# Patient Record
Sex: FEMALE | Race: WHITE | NOT HISPANIC OR LATINO | Employment: FULL TIME | URBAN - NONMETROPOLITAN AREA
[De-identification: names, ages, dates, MRNs, and addresses within clinical notes are randomized per-mention and may not be internally consistent; named-entity substitution may affect disease eponyms.]

---

## 2017-09-27 PROCEDURE — 87081 CULTURE SCREEN ONLY: CPT | Performed by: NURSE PRACTITIONER

## 2017-11-20 ENCOUNTER — TRANSCRIBE ORDERS (OUTPATIENT)
Dept: LAB | Facility: HOSPITAL | Age: 22
End: 2017-11-20

## 2017-11-20 ENCOUNTER — LAB (OUTPATIENT)
Dept: LAB | Facility: HOSPITAL | Age: 22
End: 2017-11-20

## 2017-11-20 DIAGNOSIS — Z45.09 ENCOUNTER FOR INTERROGATION OF CARDIAC RECORDER: ICD-10-CM

## 2017-11-20 DIAGNOSIS — R00.2 PALPITATIONS: Primary | ICD-10-CM

## 2017-11-20 DIAGNOSIS — R00.2 PALPITATIONS: ICD-10-CM

## 2017-11-20 LAB
ALBUMIN SERPL-MCNC: 4.1 G/DL (ref 3.4–4.8)
ALBUMIN/GLOB SERPL: 1.4 G/DL (ref 1.1–1.8)
ALP SERPL-CCNC: 51 U/L (ref 38–126)
ALT SERPL W P-5'-P-CCNC: 24 U/L (ref 9–52)
ANION GAP SERPL CALCULATED.3IONS-SCNC: 11 MMOL/L (ref 5–15)
APTT PPP: 30.2 SECONDS (ref 20–40.3)
AST SERPL-CCNC: 35 U/L (ref 14–36)
BASOPHILS # BLD AUTO: 0.04 10*3/MM3 (ref 0–0.2)
BASOPHILS NFR BLD AUTO: 0.5 % (ref 0–2)
BILIRUB SERPL-MCNC: 0.3 MG/DL (ref 0.2–1.3)
BUN BLD-MCNC: 14 MG/DL (ref 7–21)
BUN/CREAT SERPL: 18.7 (ref 7–25)
CALCIUM SPEC-SCNC: 9.4 MG/DL (ref 8.4–10.2)
CHLORIDE SERPL-SCNC: 104 MMOL/L (ref 95–110)
CO2 SERPL-SCNC: 25 MMOL/L (ref 22–31)
CREAT BLD-MCNC: 0.75 MG/DL (ref 0.5–1)
DEPRECATED RDW RBC AUTO: 37.8 FL (ref 36.4–46.3)
EOSINOPHIL # BLD AUTO: 0.18 10*3/MM3 (ref 0–0.7)
EOSINOPHIL NFR BLD AUTO: 2.2 % (ref 0–7)
ERYTHROCYTE [DISTWIDTH] IN BLOOD BY AUTOMATED COUNT: 13.2 % (ref 11.5–14.5)
GFR SERPL CREATININE-BSD FRML MDRD: 98 ML/MIN/1.73 (ref 60–165)
GLOBULIN UR ELPH-MCNC: 2.9 GM/DL (ref 2.3–3.5)
GLUCOSE BLD-MCNC: 97 MG/DL (ref 60–100)
HCT VFR BLD AUTO: 38.5 % (ref 35–45)
HGB BLD-MCNC: 12.8 G/DL (ref 12–15.5)
IMM GRANULOCYTES # BLD: 0.02 10*3/MM3 (ref 0–0.02)
IMM GRANULOCYTES NFR BLD: 0.2 % (ref 0–0.5)
INR PPP: 0.92 (ref 0.8–1.2)
LYMPHOCYTES # BLD AUTO: 2.5 10*3/MM3 (ref 0.6–4.2)
LYMPHOCYTES NFR BLD AUTO: 30 % (ref 10–50)
MCH RBC QN AUTO: 26.1 PG (ref 26.5–34)
MCHC RBC AUTO-ENTMCNC: 33.2 G/DL (ref 31.4–36)
MCV RBC AUTO: 78.4 FL (ref 80–98)
MONOCYTES # BLD AUTO: 0.54 10*3/MM3 (ref 0–0.9)
MONOCYTES NFR BLD AUTO: 6.5 % (ref 0–12)
NEUTROPHILS # BLD AUTO: 5.05 10*3/MM3 (ref 2–8.6)
NEUTROPHILS NFR BLD AUTO: 60.6 % (ref 37–80)
PLATELET # BLD AUTO: 283 10*3/MM3 (ref 150–450)
PMV BLD AUTO: 11.1 FL (ref 8–12)
POTASSIUM BLD-SCNC: 4.1 MMOL/L (ref 3.5–5.1)
PROT SERPL-MCNC: 7 G/DL (ref 6.3–8.6)
PROTHROMBIN TIME: 12.3 SECONDS (ref 11.1–15.3)
RBC # BLD AUTO: 4.91 10*6/MM3 (ref 3.77–5.16)
SODIUM BLD-SCNC: 140 MMOL/L (ref 137–145)
WBC NRBC COR # BLD: 8.33 10*3/MM3 (ref 3.2–9.8)

## 2017-11-20 PROCEDURE — 80053 COMPREHEN METABOLIC PANEL: CPT

## 2017-11-20 PROCEDURE — 85610 PROTHROMBIN TIME: CPT

## 2017-11-20 PROCEDURE — 85025 COMPLETE CBC W/AUTO DIFF WBC: CPT

## 2017-11-20 PROCEDURE — 85730 THROMBOPLASTIN TIME PARTIAL: CPT

## 2017-11-20 PROCEDURE — 36415 COLL VENOUS BLD VENIPUNCTURE: CPT

## 2017-11-28 ENCOUNTER — OFFICE VISIT (OUTPATIENT)
Dept: FAMILY MEDICINE CLINIC | Facility: CLINIC | Age: 22
End: 2017-11-28

## 2017-11-28 ENCOUNTER — APPOINTMENT (OUTPATIENT)
Dept: LAB | Facility: HOSPITAL | Age: 22
End: 2017-11-28

## 2017-11-28 VITALS
BODY MASS INDEX: 30.75 KG/M2 | SYSTOLIC BLOOD PRESSURE: 108 MMHG | HEIGHT: 63 IN | WEIGHT: 173.56 LBS | DIASTOLIC BLOOD PRESSURE: 60 MMHG | HEART RATE: 97 BPM | OXYGEN SATURATION: 99 %

## 2017-11-28 DIAGNOSIS — Z23 NEED FOR HPV VACCINATION: ICD-10-CM

## 2017-11-28 DIAGNOSIS — F41.9 ANXIETY: Primary | ICD-10-CM

## 2017-11-28 LAB
T4 FREE SERPL-MCNC: 0.8 NG/DL (ref 0.78–2.19)
TSH SERPL DL<=0.05 MIU/L-ACNC: 1.38 MIU/ML (ref 0.46–4.68)

## 2017-11-28 PROCEDURE — 36415 COLL VENOUS BLD VENIPUNCTURE: CPT | Performed by: FAMILY MEDICINE

## 2017-11-28 PROCEDURE — 84443 ASSAY THYROID STIM HORMONE: CPT | Performed by: FAMILY MEDICINE

## 2017-11-28 PROCEDURE — 99203 OFFICE O/P NEW LOW 30 MIN: CPT | Performed by: FAMILY MEDICINE

## 2017-11-28 PROCEDURE — 90471 IMMUNIZATION ADMIN: CPT | Performed by: FAMILY MEDICINE

## 2017-11-28 PROCEDURE — 84439 ASSAY OF FREE THYROXINE: CPT | Performed by: FAMILY MEDICINE

## 2017-11-28 PROCEDURE — 90649 4VHPV VACCINE 3 DOSE IM: CPT | Performed by: FAMILY MEDICINE

## 2017-11-28 RX ORDER — CITALOPRAM 20 MG/1
20 TABLET ORAL DAILY
Qty: 30 TABLET | Refills: 0 | Status: SHIPPED | OUTPATIENT
Start: 2017-11-28 | End: 2017-12-26 | Stop reason: SDUPTHER

## 2017-11-28 NOTE — PROGRESS NOTES
"  Subjective:     Yumiko Cooley is a 22 y.o. female who presents to establish care for anxiety.    ANXIETY HPI:     General:  Onset of symptoms: gradual  Duration of symptoms: since 2016, there was no specific event that she can link to the start of her anxiety.  Concerns: easily irritated  Stressors: \"sensory overload\", some difficulty functioning at work, easily overwhelmed   Current diagnosis: n/a  Response to treatment: n/a    Anxiety Symptoms:   Feeling anxious, Feeling irritable and Difficulty concentrating  General Anxiety Disorder Symptoms:   Excessive anxiety or worry , Experiencing anxiety lasting more than 6 months, Feeling restless, Difficulty concentrating and Feeling irritable  Panic Attack Symptoms:   none*  Panic Disorder Symptoms:   none*  Specific Anxiety Symptoms:    none*  Associated Symptoms:   Feeling depressed, Loss of interest or pleasure in activities and Lack of energy  Comorbid Conditions:   Family Hx of depression  PHQ-9 Depression Screening  Little interest or pleasure in doing things? 1   Feeling down, depressed, or hopeless? 1   Trouble falling or staying asleep, or sleeping too much? 0   Feeling tired or having little energy? 2   Poor appetite or overeating? 0   Feeling bad about yourself - or that you are a failure or have let yourself or your family down? 2   Trouble concentrating on things, such as reading the newspaper or watching television? 0   Moving or speaking so slowly that other people could have noticed? Or the opposite - being so fidgety or restless that you have been moving around a lot more than usual? 2   Thoughts that you would be better off dead, or of hurting yourself in some way? 0   PHQ-9 Total Score 8   If you checked off any problems, how difficult have these problems made it for you to do your work, take care of things at home, or get along with other people? Somewhat difficult (Biting nails until they bleed, snapping at people, easily irritated, " overwhelmed very easily, sensory overload)     Preventative:  Over the past 2 weeks, have you felt down, depressed, or hopeless?Yes   Over the past 2 weeks, have you felt little interest or pleasure in doing things?Yes  Clinical depression screening refused by patient.No     On osteoporosis therapy?Not Indicated     Past Medical Hx:  Past Medical History:   Diagnosis Date   • Anxiety    • SVT (supraventricular tachycardia)     s/p ablation November 2015       Past Surgical Hx:  Past Surgical History:   Procedure Laterality Date   • APPENDECTOMY  11/2012   • CARDIAC ABLATION  11/2015    for SVT       Health Maintenance:  Health Maintenance   Topic Date Due   • INFLUENZA VACCINE  08/01/2017   • PAP SMEAR  09/27/2017   • TDAP/TD VACCINES (2 - Td) 08/07/2024       Current Meds:  No current outpatient prescriptions on file.    Allergies:  Review of patient's allergies indicates no active allergies.    Family Hx:  Family History   Problem Relation Age of Onset   • Depression Mother    • Hypertension Father        Social History:  Social History     Social History   • Marital status:      Spouse name: N/A   • Number of children: N/A   • Years of education: N/A     Occupational History   • Not on file.     Social History Main Topics   • Smoking status: Not on file   • Smokeless tobacco: Not on file   • Alcohol use Not on file   • Drug use: Not on file   • Sexual activity: Not on file     Other Topics Concern   • Not on file     Social History Narrative       Review of Systems  Review of Systems   Constitutional: Positive for fatigue. Negative for appetite change, chills, fever and unexpected weight change.   HENT: Negative for congestion, sore throat and trouble swallowing.    Eyes: Negative for photophobia and visual disturbance.   Respiratory: Negative for cough, chest tightness, shortness of breath and wheezing.    Cardiovascular: Negative for chest pain, palpitations and leg swelling.   Gastrointestinal: Negative  "for abdominal distention, abdominal pain, constipation, diarrhea, nausea and vomiting.   Endocrine: Negative for cold intolerance and heat intolerance.   Genitourinary: Negative for decreased urine volume and difficulty urinating.   Musculoskeletal: Negative for arthralgias and myalgias.   Skin: Negative for color change and rash.   Neurological: Negative for dizziness, weakness, light-headedness and headaches.   Psychiatric/Behavioral: Positive for decreased concentration. Negative for confusion, sleep disturbance and suicidal ideas. The patient is nervous/anxious.      Objective:     /60 (BP Location: Left arm, Patient Position: Sitting, Cuff Size: Adult)  Pulse 97  Ht 63\" (160 cm)  Wt 173 lb 9 oz (78.7 kg)  SpO2 99%  BMI 30.75 kg/m2    Physical Exam   Constitutional: She is oriented to person, place, and time. She appears well-developed and well-nourished.   HENT:   Head: Normocephalic and atraumatic.   Nose: Nose normal.   Eyes: Conjunctivae and EOM are normal. Pupils are equal, round, and reactive to light.   Neck: Normal range of motion. Neck supple.   Cardiovascular: Normal rate, regular rhythm, normal heart sounds and intact distal pulses.  Exam reveals no friction rub.    No murmur heard.  Pulmonary/Chest: Effort normal and breath sounds normal. No respiratory distress. She has no wheezes. She has no rales. She exhibits no tenderness.   Abdominal: Soft. Bowel sounds are normal. She exhibits no distension. There is no tenderness.   Musculoskeletal: Normal range of motion. She exhibits no edema.   Neurological: She is alert and oriented to person, place, and time.   Skin: Skin is warm and dry.   Psychiatric: She has a normal mood and affect. Her behavior is normal.   Vitals reviewed.       Assessment/Plan:     Yumiko was seen today for establish care and anxiety.    Diagnoses and all orders for this visit:    Anxiety  -     TSH  -     T4, Free  -     citalopram (CELEXA) 20 MG tablet; Take 1 tablet " by mouth Daily.    Need for HPV vaccination  -     HPV Vaccine QuadriValent 3 Dose IM      Follow-up:     Return in about 1 month (around 12/28/2017) for PAP SMEAR. & HPV #2    GOALS:  Control Anxiety  BARRIERS:  None    Preventative:  Female Preventative: needs pap smear  Recommended Vaccines: HPV series      Never Smoker  does not drink  eat more fruits and vegetables, increase water intake, increase physical activity and have 3 meals a day    RISK SCORE: 3      Signature  Esperanza Soriano MD PGY3  [place] [address]  Office: [phone]    This document has been electronically signed by Esperanza Soriano MD on November 29, 2017 8:46 AM

## 2017-11-29 NOTE — PROGRESS NOTES
I have reviewed the notes, assessments, and/or procedures performed. I concur with her/his documentation of Yumiko Cooley.     Mark Jackson, DO

## 2017-12-26 ENCOUNTER — OFFICE VISIT (OUTPATIENT)
Dept: FAMILY MEDICINE CLINIC | Facility: CLINIC | Age: 22
End: 2017-12-26

## 2017-12-26 VITALS
DIASTOLIC BLOOD PRESSURE: 66 MMHG | HEART RATE: 86 BPM | SYSTOLIC BLOOD PRESSURE: 110 MMHG | HEIGHT: 63 IN | BODY MASS INDEX: 30.73 KG/M2 | WEIGHT: 173.44 LBS | OXYGEN SATURATION: 98 %

## 2017-12-26 DIAGNOSIS — Z23 IMMUNIZATION DUE: ICD-10-CM

## 2017-12-26 DIAGNOSIS — F41.9 ANXIETY: Primary | ICD-10-CM

## 2017-12-26 PROCEDURE — 90471 IMMUNIZATION ADMIN: CPT | Performed by: FAMILY MEDICINE

## 2017-12-26 PROCEDURE — 99213 OFFICE O/P EST LOW 20 MIN: CPT | Performed by: FAMILY MEDICINE

## 2017-12-26 PROCEDURE — 90649 4VHPV VACCINE 3 DOSE IM: CPT | Performed by: FAMILY MEDICINE

## 2017-12-26 RX ORDER — CITALOPRAM 20 MG/1
20 TABLET ORAL DAILY
Qty: 30 TABLET | Refills: 3 | Status: SHIPPED | OUTPATIENT
Start: 2017-12-26 | End: 2018-04-03 | Stop reason: SDUPTHER

## 2018-01-05 NOTE — PROGRESS NOTES
I have reviewed the notes, assessments, and/or procedures performed by Esperanza Soriano MD , I concur with her/his documentation of Yumiko Cooley.         This document has been electronically signed by Jenny Martínez MD on January 5, 2018 9:53 AM

## 2018-04-03 ENCOUNTER — OFFICE VISIT (OUTPATIENT)
Dept: FAMILY MEDICINE CLINIC | Facility: CLINIC | Age: 23
End: 2018-04-03

## 2018-04-03 ENCOUNTER — APPOINTMENT (OUTPATIENT)
Dept: LAB | Facility: HOSPITAL | Age: 23
End: 2018-04-03

## 2018-04-03 DIAGNOSIS — Z31.69 INFERTILITY COUNSELING: ICD-10-CM

## 2018-04-03 DIAGNOSIS — F41.9 ANXIETY: ICD-10-CM

## 2018-04-03 DIAGNOSIS — R03.0 ELEVATED BLOOD PRESSURE READING: Primary | ICD-10-CM

## 2018-04-03 LAB
ALBUMIN SERPL-MCNC: 4.6 G/DL (ref 3.4–4.8)
ALBUMIN/GLOB SERPL: 1.4 G/DL (ref 1.1–1.8)
ALP SERPL-CCNC: 56 U/L (ref 38–126)
ALT SERPL W P-5'-P-CCNC: 24 U/L (ref 9–52)
ANION GAP SERPL CALCULATED.3IONS-SCNC: 14 MMOL/L (ref 5–15)
AST SERPL-CCNC: 36 U/L (ref 14–36)
BILIRUB SERPL-MCNC: 0.3 MG/DL (ref 0.2–1.3)
BUN BLD-MCNC: 10 MG/DL (ref 7–21)
BUN/CREAT SERPL: 15.9 (ref 7–25)
CALCIUM SPEC-SCNC: 10 MG/DL (ref 8.4–10.2)
CHLORIDE SERPL-SCNC: 99 MMOL/L (ref 95–110)
CO2 SERPL-SCNC: 28 MMOL/L (ref 22–31)
CREAT BLD-MCNC: 0.63 MG/DL (ref 0.5–1)
GFR SERPL CREATININE-BSD FRML MDRD: 119 ML/MIN/1.73 (ref 60–165)
GLOBULIN UR ELPH-MCNC: 3.2 GM/DL (ref 2.3–3.5)
GLUCOSE BLD-MCNC: 87 MG/DL (ref 60–100)
POTASSIUM BLD-SCNC: 4.1 MMOL/L (ref 3.5–5.1)
PROT SERPL-MCNC: 7.8 G/DL (ref 6.3–8.6)
SODIUM BLD-SCNC: 141 MMOL/L (ref 137–145)
TSH SERPL DL<=0.05 MIU/L-ACNC: 2.14 MIU/ML (ref 0.46–4.68)

## 2018-04-03 PROCEDURE — 84144 ASSAY OF PROGESTERONE: CPT | Performed by: FAMILY MEDICINE

## 2018-04-03 PROCEDURE — 80053 COMPREHEN METABOLIC PANEL: CPT | Performed by: FAMILY MEDICINE

## 2018-04-03 PROCEDURE — 81220 CFTR GENE COM VARIANTS: CPT | Performed by: FAMILY MEDICINE

## 2018-04-03 PROCEDURE — 36415 COLL VENOUS BLD VENIPUNCTURE: CPT | Performed by: FAMILY MEDICINE

## 2018-04-03 PROCEDURE — 84443 ASSAY THYROID STIM HORMONE: CPT | Performed by: FAMILY MEDICINE

## 2018-04-03 PROCEDURE — 83001 ASSAY OF GONADOTROPIN (FSH): CPT | Performed by: FAMILY MEDICINE

## 2018-04-03 PROCEDURE — 99213 OFFICE O/P EST LOW 20 MIN: CPT | Performed by: FAMILY MEDICINE

## 2018-04-03 RX ORDER — CITALOPRAM 20 MG/1
20 TABLET ORAL DAILY
Qty: 30 TABLET | Refills: 3 | Status: SHIPPED | OUTPATIENT
Start: 2018-04-03 | End: 2018-06-12 | Stop reason: SDUPTHER

## 2018-04-03 NOTE — PROGRESS NOTES
"  Subjective:     Chief Complaint   Patient presents with   • Anxiety       Yumiko Cooley is a 23 y.o. female who presents for 3 month follow-up of anxiety.  She was started on Celexa 20 mg, and has done well with it and notes no adverse effects.    She also wants to be tested for infertility.  She and her  have been trying for about 13 months.  He has not been evaluated.  Her last period was 3/5 - 3/10.    ANXIETY HPI:     General:  Onset of symptoms: gradual  Duration of symptoms: since 2016, there was no specific event that she can link to the start of her anxiety.  Concerns: easily irritated  Stressors: \"sensory overload\", some difficulty functioning at work, easily overwhelmed   Current diagnosis: anxiety  Response to treatment: effective    Anxiety Symptoms:   Feeling anxious, Feeling irritable and Difficulty concentrating  General Anxiety Disorder Symptoms:   Excessive anxiety or worry , Experiencing anxiety lasting more than 6 months, Feeling restless, Difficulty concentrating and Feeling irritable  Panic Attack Symptoms:   none*  Panic Disorder Symptoms:   none*  Specific Anxiety Symptoms:    none*  Associated Symptoms:   Feeling depressed, Loss of interest or pleasure in activities and Lack of energy  Comorbid Conditions:   Family Hx of depression  PHQ-9 Depression Screening  Little interest or pleasure in doing things? 0   Feeling down, depressed, or hopeless? 0   Trouble falling or staying asleep, or sleeping too much? 1   Feeling tired or having little energy? 1   Poor appetite or overeating? 0   Feeling bad about yourself - or that you are a failure or have let yourself or your family down? 0   Trouble concentrating on things, such as reading the newspaper or watching television? 0   Moving or speaking so slowly that other people could have noticed? Or the opposite - being so fidgety or restless that you have been moving around a lot more than usual? 0   Thoughts that you would be " better off dead, or of hurting yourself in some way? 0   PHQ-9 Total Score 2   If you checked off any problems, how difficult have these problems made it for you to do your work, take care of things at home, or get along with other people? Not difficult at all     Preventative:  Over the past 2 weeks, have you felt down, depressed, or hopeless?No  Over the past 2 weeks, have you felt little interest or pleasure in doing things?No  Clinical depression screening refused by patient.No     On osteoporosis therapy?Not Indicated     Past Medical Hx:  Past Medical History:   Diagnosis Date   • Anxiety    • SVT (supraventricular tachycardia)     s/p ablation November 2015       Past Surgical Hx:  Past Surgical History:   Procedure Laterality Date   • APPENDECTOMY  11/2012   • CARDIAC ABLATION  11/2015    for SVT       Health Maintenance:  Health Maintenance   Topic Date Due   • PAP SMEAR  09/27/2017   • HPV VACCINES (3 of 3 - Female 3 Dose Series) 05/28/2018   • TDAP/TD VACCINES (2 - Td) 08/07/2024   • INFLUENZA VACCINE  Completed       Current Meds:    Current Outpatient Prescriptions:   •  citalopram (CELEXA) 20 MG tablet, Take 1 tablet by mouth Daily., Disp: 30 tablet, Rfl: 3    Allergies:  Vancomycin    Family Hx:  Family History   Problem Relation Age of Onset   • Depression Mother    • Hypertension Father        Social History:  Social History     Social History   • Marital status:      Spouse name: N/A   • Number of children: N/A   • Years of education: N/A     Occupational History   • Not on file.     Social History Main Topics   • Smoking status: Never Smoker   • Smokeless tobacco: Never Used   • Alcohol use No   • Drug use: No   • Sexual activity: Yes     Partners: Male     Other Topics Concern   • Not on file     Social History Narrative   • No narrative on file       Review of Systems  Review of Systems   Constitutional: Positive for fatigue. Negative for appetite change, chills, fever and unexpected  "weight change.   HENT: Negative for congestion, sore throat and trouble swallowing.    Eyes: Negative for photophobia and visual disturbance.   Respiratory: Negative for cough, chest tightness, shortness of breath and wheezing.    Cardiovascular: Negative for chest pain, palpitations and leg swelling.   Gastrointestinal: Negative for abdominal distention, abdominal pain, constipation, diarrhea, nausea and vomiting.   Endocrine: Negative for cold intolerance and heat intolerance.   Genitourinary: Negative for decreased urine volume and difficulty urinating.   Musculoskeletal: Negative for arthralgias and myalgias.   Skin: Negative for color change and rash.   Neurological: Negative for dizziness, weakness, light-headedness and headaches.   Psychiatric/Behavioral: Positive for dysphoric mood. Negative for confusion, sleep disturbance and suicidal ideas. The patient is nervous/anxious.      Objective:     Vitals:    04/03/18 1448 04/03/18 1508   BP: 150/100 120/78   BP Location: Left arm    Patient Position: Sitting    Cuff Size: Adult    Pulse: 90    SpO2: 99%    Weight: 80.9 kg (178 lb 4 oz)    Height: 199.9 cm (78.7\")        Physical Exam   Constitutional: She is oriented to person, place, and time. She appears well-developed and well-nourished.   HENT:   Head: Normocephalic and atraumatic.   Nose: Nose normal.   Eyes: Conjunctivae and EOM are normal. Pupils are equal, round, and reactive to light.   Neck: Normal range of motion. Neck supple.   Cardiovascular: Normal rate, regular rhythm, normal heart sounds and intact distal pulses.  Exam reveals no friction rub.    No murmur heard.  Pulmonary/Chest: Effort normal and breath sounds normal. No respiratory distress. She has no wheezes. She has no rales. She exhibits no tenderness.   Abdominal: Soft. Bowel sounds are normal. She exhibits no distension. There is no tenderness.   Musculoskeletal: Normal range of motion. She exhibits no edema.   Neurological: She is " alert and oriented to person, place, and time.   Skin: Skin is warm and dry.   Psychiatric: She has a normal mood and affect. Her behavior is normal.   Vitals reviewed.       Assessment/Plan:     Yumiko was seen today for anxiety.    Diagnoses and all orders for this visit:    Elevated blood pressure reading  - Repeat blood pressure at goal  -     Comprehensive Metabolic Panel  -     Blood Pressure Device - advised to check 2-3 times weekly  - Educational materials given to patient (DASH diet)    Anxiety  -     citalopram (CELEXA) 20 MG tablet; Take 1 tablet by mouth Daily.    Infertility counseling  -     Follicle Stimulating Hormone  -     TSH  -     Prolactin  -     Progesterone      Follow-up:     Return in about 1 month (around 5/3/2018) for Recheck high blood pressure.    Goals        Patient Stated    • Reduce fat intake/eat healthier (pt-stated)            Barriers:  Likes [employer], but will try to eat it less frequently, e.g. Once every 2 weeks instead of weekly            Preventative:  Female Preventative: needs pap smear, she would prefer with her PCP  Recommended Vaccines: HPV series (3rd)    Never Smoker  does not drink  eat more fruits and vegetables, increase water intake, increase physical activity and have 3 meals a day    RISK SCORE: 3          This document has been electronically signed by Stu Pena MD on April 7, 2018 8:59 PM

## 2018-04-04 LAB — FSH SERPL-ACNC: 3.9 MIU/ML

## 2018-04-05 LAB
PROGEST SERPL-MCNC: 4.1 NG/ML
PROLACTIN SERPL-MCNC: 17.8 NG/ML (ref 4.8–23.3)

## 2018-04-07 VITALS
SYSTOLIC BLOOD PRESSURE: 120 MMHG | BODY MASS INDEX: 24.14 KG/M2 | WEIGHT: 178.25 LBS | HEART RATE: 90 BPM | HEIGHT: 72 IN | DIASTOLIC BLOOD PRESSURE: 78 MMHG | OXYGEN SATURATION: 99 %

## 2018-04-08 NOTE — PROGRESS NOTES
I have reviewed the notes, assessments, and/or procedures performed by Dr. Soriano, I concur with her/his documentation of Yumiko Cooley.         This document has been electronically signed by Rosanne Sandra MD on April 8, 2018 12:23 AM

## 2018-06-12 ENCOUNTER — OFFICE VISIT (OUTPATIENT)
Dept: FAMILY MEDICINE CLINIC | Facility: CLINIC | Age: 23
End: 2018-06-12

## 2018-06-12 VITALS
SYSTOLIC BLOOD PRESSURE: 122 MMHG | DIASTOLIC BLOOD PRESSURE: 74 MMHG | WEIGHT: 182 LBS | HEIGHT: 63 IN | OXYGEN SATURATION: 98 % | BODY MASS INDEX: 32.25 KG/M2 | HEART RATE: 91 BPM

## 2018-06-12 DIAGNOSIS — F41.9 ANXIETY: Primary | ICD-10-CM

## 2018-06-12 DIAGNOSIS — Z23 NEED FOR HPV VACCINATION: ICD-10-CM

## 2018-06-12 PROCEDURE — 90471 IMMUNIZATION ADMIN: CPT | Performed by: FAMILY MEDICINE

## 2018-06-12 PROCEDURE — 90649 4VHPV VACCINE 3 DOSE IM: CPT | Performed by: FAMILY MEDICINE

## 2018-06-12 PROCEDURE — 99213 OFFICE O/P EST LOW 20 MIN: CPT | Performed by: FAMILY MEDICINE

## 2018-06-12 RX ORDER — CITALOPRAM 40 MG/1
40 TABLET ORAL DAILY
Qty: 30 TABLET | Refills: 0 | Status: SHIPPED | OUTPATIENT
Start: 2018-06-12 | End: 2018-07-25 | Stop reason: SDUPTHER

## 2018-06-12 NOTE — PROGRESS NOTES
"  Subjective:     Yumiko Cooley is a 23 y.o. female who presents for follow up of anxiety and elevated blood pressure reading.    Elevated blood pressure reading:   Patient was seen in the office 4/3/18 for anxiety follow up, was found to have blood pressure of 150/100, recheck at the end of her office visit showed a reading of 120/78 without medical intervention.  She was instructed to start a DASH diet.  She has had no further elevated readings.    ANXIETY HPI:     General:  Onset of symptoms: gradual  Duration of symptoms: since 2016, there was no specific event that she can link to the start of her anxiety.  Concerns: easily irritated  Stressors: \"sensory overload\", some difficulty functioning at work, easily overwhelmed   Current diagnosis: Anxiety  Response to treatment: initial good response to Celexa 20mg but now feels like it does not work as well for her.  She is still not interested in counseling.    Anxiety Symptoms:   Feeling anxious, Feeling irritable and Difficulty concentrating  General Anxiety Disorder Symptoms:   Excessive anxiety or worry , Experiencing anxiety lasting more than 6 months, Feeling restless, Difficulty concentrating and Feeling irritable  Panic Attack Symptoms:   none*  Panic Disorder Symptoms:   none*  Specific Anxiety Symptoms:    none*  Associated Symptoms:   Feeling depressed, Loss of interest or pleasure in activities and Lack of energy  Comorbid Conditions:   Family Hx of depression    Preventative:  Over the past 2 weeks, have you felt down, depressed, or hopeless?Yes   Over the past 2 weeks, have you felt little interest or pleasure in doing things?Yes  Clinical depression screening refused by patient.No     On osteoporosis therapy?Not Indicated     Past Medical Hx:  Past Medical History:   Diagnosis Date   • Anxiety    • SVT (supraventricular tachycardia)     s/p ablation November 2015       Past Surgical Hx:  Past Surgical History:   Procedure Laterality Date "   • APPENDECTOMY  11/2012   • CARDIAC ABLATION  11/2015    for SVT       Health Maintenance:  Health Maintenance   Topic Date Due   • ANNUAL PHYSICAL  04/05/1999   • PAP SMEAR  09/27/2017   • HPV VACCINES (3 of 3 - Female 3 Dose Series) 05/28/2018   • INFLUENZA VACCINE  08/01/2018   • TDAP/TD VACCINES (2 - Td) 08/07/2024   • MENINGOCOCCAL VACCINE (Normal Risk)  Completed       Current Meds:    Current Outpatient Prescriptions:   •  citalopram (CELEXA) 20 MG tablet, Take 1 tablet by mouth Daily., Disp: 30 tablet, Rfl: 3    Allergies:  Vancomycin    Family Hx:  Family History   Problem Relation Age of Onset   • Depression Mother    • Hypertension Father        Social History:  Social History     Social History   • Marital status:      Spouse name: N/A   • Number of children: N/A   • Years of education: N/A     Occupational History   • Not on file.     Social History Main Topics   • Smoking status: Never Smoker   • Smokeless tobacco: Never Used   • Alcohol use No   • Drug use: No   • Sexual activity: Yes     Partners: Male     Other Topics Concern   • Not on file     Social History Narrative   • No narrative on file       Review of Systems  Review of Systems   Constitutional: Negative for activity change, appetite change, chills, fever and unexpected weight change.   HENT: Negative for congestion and trouble swallowing.    Eyes: Negative for photophobia and visual disturbance.   Respiratory: Negative for cough, chest tightness, shortness of breath and wheezing.    Cardiovascular: Negative for chest pain, palpitations and leg swelling.   Gastrointestinal: Negative for abdominal pain, constipation, diarrhea, nausea and vomiting.   Endocrine: Negative for cold intolerance and heat intolerance.   Genitourinary: Negative for decreased urine volume and difficulty urinating.   Musculoskeletal: Negative for arthralgias and myalgias.   Skin: Negative for color change and rash.   Neurological: Negative for dizziness,  weakness, light-headedness and headaches.   Psychiatric/Behavioral: Positive for decreased concentration. Negative for confusion, sleep disturbance and suicidal ideas. The patient is nervous/anxious.      Objective:     There were no vitals taken for this visit.    Physical Exam   Constitutional: She is oriented to person, place, and time. She appears well-developed and well-nourished. No distress.   HENT:   Head: Normocephalic and atraumatic.   Nose: Nose normal.   Eyes: Conjunctivae and EOM are normal.   Neck: Normal range of motion. Neck supple.   Cardiovascular: Normal rate, regular rhythm, normal heart sounds and intact distal pulses.  Exam reveals no friction rub.    No murmur heard.  Pulmonary/Chest: Effort normal and breath sounds normal. No respiratory distress. She has no wheezes. She has no rales. She exhibits no tenderness.   Abdominal: Soft. Bowel sounds are normal. She exhibits no distension. There is no tenderness.   Musculoskeletal: Normal range of motion. She exhibits no edema.   Neurological: She is alert and oriented to person, place, and time.   Skin: Skin is warm and dry. Capillary refill takes less than 2 seconds. She is not diaphoretic.   Psychiatric: She has a normal mood and affect. Her behavior is normal.   Vitals reviewed.       Assessment/Plan:     Yumiko was seen today for elevated blood pressure reading and anxiety.    Diagnoses and all orders for this visit:    Anxiety  -     citalopram (CeleXA) 40 MG tablet; Take 1 tablet by mouth Daily.    Need for HPV vaccination  -     HPV Vaccine QuadriValent 3 Dose IM      Follow-up:     Return in about 3 months (around 9/12/2018) for Recheck/Est Care Anxiety with Edilia Ch MD (blue team intern).    Goals        Patient Stated    • Reduce fat intake/eat healthier (pt-stated)            Barriers:  Likes [employer], but will try to eat it less frequently, e.g. Once every 2 weeks instead of weekly          Preventative:  Female Preventative:  needs pap smear  Recommended Vaccines: completion of HPV series      Never Smoker  does not drink  eat more fruits and vegetables, increase water intake, increase physical activity and have 3 meals a day    Patient's Body mass index is 32.24 kg/m². BMI is above normal parameters. Recommendations include: exercise counseling and nutrition counseling.      RISK SCORE: 3      Signature  Esperanza Soriano MD PGY3  [place] [address]  Office: [phone]    This document has been electronically signed by Esperanza Soriano MD on June 12, 2018 3:39 PM

## 2018-07-25 DIAGNOSIS — F41.9 ANXIETY: ICD-10-CM

## 2018-07-25 RX ORDER — CITALOPRAM 40 MG/1
TABLET ORAL
Qty: 30 TABLET | Refills: 0 | OUTPATIENT
Start: 2018-07-25 | End: 2018-09-01

## 2018-07-31 DIAGNOSIS — F41.9 ANXIETY: ICD-10-CM

## 2018-08-01 RX ORDER — CITALOPRAM 40 MG/1
TABLET ORAL
Qty: 30 TABLET | Refills: 0 | Status: SHIPPED | OUTPATIENT
Start: 2018-08-01 | End: 2018-09-10 | Stop reason: SDUPTHER

## 2018-09-10 ENCOUNTER — OFFICE VISIT (OUTPATIENT)
Dept: FAMILY MEDICINE CLINIC | Facility: CLINIC | Age: 23
End: 2018-09-10

## 2018-09-10 VITALS
OXYGEN SATURATION: 99 % | SYSTOLIC BLOOD PRESSURE: 126 MMHG | BODY MASS INDEX: 31.54 KG/M2 | DIASTOLIC BLOOD PRESSURE: 74 MMHG | HEART RATE: 82 BPM | HEIGHT: 63 IN | WEIGHT: 178 LBS

## 2018-09-10 DIAGNOSIS — F41.9 ANXIETY: ICD-10-CM

## 2018-09-10 PROCEDURE — 99213 OFFICE O/P EST LOW 20 MIN: CPT | Performed by: STUDENT IN AN ORGANIZED HEALTH CARE EDUCATION/TRAINING PROGRAM

## 2018-09-10 RX ORDER — CITALOPRAM 40 MG/1
40 TABLET ORAL DAILY
Qty: 30 TABLET | Refills: 3 | Status: SHIPPED | OUTPATIENT
Start: 2018-09-10 | End: 2018-10-10

## 2018-09-10 NOTE — PROGRESS NOTES
ID: Yumiko Cooley    CC:   Chief Complaint   Patient presents with   • Anxiety       Subjective:     HPI     Yumiko Cooley is a 23 y.o. female who presents for medication refills.     Pt has been on Celexa for the past several months and states has been doing very well on it. She has not been feeling as anxious as she use to.     She has been experiencing irregular bleeding which she has a OB appt for on September 27.     Preventative:  Over the past 2 weeks, have you felt down, depressed, or hopeless? no  Over the past 2 weeks, have you felt little interest or pleasure in doing things? no  Clinical depression screening refused by patient no    On osteoporosis therapy? no    Past Medical Hx:  Past Medical History:   Diagnosis Date   • Anxiety    • SVT (supraventricular tachycardia) (CMS/HCC)     s/p ablation November 2015       Past Surgical Hx:  Past Surgical History:   Procedure Laterality Date   • APPENDECTOMY  11/2012   • CARDIAC ABLATION  11/2015    for SVT       Health Maintenance:  Health Maintenance   Topic Date Due   • ANNUAL PHYSICAL  04/05/1999   • PAP SMEAR  09/27/2017   • INFLUENZA VACCINE  08/01/2018   • TDAP/TD VACCINES (2 - Td) 08/07/2024   • MENINGOCOCCAL VACCINE (Normal Risk)  Completed   • HPV VACCINES  Completed       Current Meds:    Current Outpatient Prescriptions:   •  citalopram (CeleXA) 40 MG tablet, Take 1 tablet by mouth Daily for 30 days., Disp: 30 tablet, Rfl: 3  •  medroxyPROGESTERone (PROVERA) 10 MG tablet, Take 1 tablet by mouth Daily for 10 days., Disp: 10 tablet, Rfl: 0    Allergies:  Vancomycin    Family Hx:  Family History   Problem Relation Age of Onset   • Depression Mother    • Hypertension Father         Social History:  Social History     Social History   • Marital status:      Spouse name: N/A   • Number of children: N/A   • Years of education: N/A     Occupational History   • Not on file.     Social History Main Topics   • Smoking status:  "Never Smoker   • Smokeless tobacco: Never Used   • Alcohol use No   • Drug use: No   • Sexual activity: Yes     Partners: Male     Other Topics Concern   • Not on file     Social History Narrative   • No narrative on file       Review of Systems   HENT: Negative for congestion, dental problem, drooling and ear discharge.    Cardiovascular: Negative for chest pain, palpitations and leg swelling.   Gastrointestinal: Negative for abdominal pain, anal bleeding, blood in stool and constipation.   Endocrine: Negative for heat intolerance, polyphagia and polyuria.   Genitourinary: Negative for genital sores, hematuria and menstrual problem.   Musculoskeletal: Negative for back pain, gait problem and joint swelling.   Skin: Negative for color change, pallor and rash.   Neurological: Negative for dizziness, facial asymmetry and headaches.   Psychiatric/Behavioral: Negative for behavioral problems, confusion, decreased concentration, hallucinations, self-injury and sleep disturbance. The patient is not hyperactive.    All other systems reviewed and are negative.          Objective:     /74   Pulse 82   Ht 160 cm (63\")   Wt 80.7 kg (178 lb)   LMP 08/21/2018 (Exact Date)   SpO2 99%   BMI 31.53 kg/m²     Physical Exam   Constitutional: She is oriented to person, place, and time. She appears well-developed and well-nourished.   HENT:   Head: Normocephalic and atraumatic.   Right Ear: External ear normal.   Left Ear: External ear normal.   Nose: Nose normal.   Mouth/Throat: Oropharynx is clear and moist.   Eyes: Pupils are equal, round, and reactive to light. Conjunctivae and EOM are normal.   Neck: Normal range of motion. Neck supple.   Cardiovascular: Normal rate, regular rhythm, normal heart sounds and intact distal pulses.    Pulmonary/Chest: Effort normal and breath sounds normal.   Abdominal: Soft. Bowel sounds are normal.   Musculoskeletal: Normal range of motion.   Neurological: She is alert and oriented to " person, place, and time.   Skin: Skin is warm and dry.   Psychiatric: She has a normal mood and affect. Her behavior is normal. Judgment and thought content normal.              Assessment/Plan:     Yumiko was seen today for anxiety.    Diagnoses and all orders for this visit:    Anxiety  -     citalopram (CeleXA) 40 MG tablet; Take 1 tablet by mouth Daily for 30 days.  - doing well   -continue taking once a day             Follow-up:     Follow up in three months     Goals:   Goals     • Reduce fat intake/eat healthier (pt-stated)            Barriers:  Likes [employer], but will try to eat it less frequently, e.g. Once every 2 weeks instead of weekly          Barriers to goals: none     Health Maintenance   Topic Date Due   • ANNUAL PHYSICAL  04/05/1999   • PAP SMEAR  09/27/2017   • INFLUENZA VACCINE  08/01/2018   • TDAP/TD VACCINES (2 - Td) 08/07/2024   • MENINGOCOCCAL VACCINE (Normal Risk)  Completed   • HPV VACCINES  Completed       Tobacco: nonsmoker  Alcohol: occasional/rare  Lifestyle: Patient's Body mass index is 31.53 kg/m². BMI is above normal parameters. Recommendations include: exercise counseling and nutrition counseling.   eat more fruits and vegetables, decrease soda or juice intake, increase water intake, increase physical activity, reduce screen time, reduce portion size, cut out extra servings and reduce fast food intake    RISK SCORE: 3         Edilia Ch M.D. PGY1  [facility]  [address] [place] [address]  Office: [phone]    This document has been electronically signed by Edilia hC MD on September 10, 2018 9:25 AM          This document has been electronically signed by Edilia Ch MD on September 10, 2018 9:22 AM

## 2018-09-11 NOTE — PROGRESS NOTES
Pt's appt appears to be with GYN and not with OB.  I have seen this patient and discussed the case with resident and agree with the assessment and plan.  CLARENCE Samuel M.D.

## 2018-09-13 ENCOUNTER — LAB (OUTPATIENT)
Dept: LAB | Facility: HOSPITAL | Age: 23
End: 2018-09-13

## 2018-09-13 DIAGNOSIS — N92.3 VAGINAL BLEEDING BETWEEN PERIODS: ICD-10-CM

## 2018-09-13 LAB
BASOPHILS # BLD AUTO: 0.04 10*3/MM3 (ref 0–0.2)
BASOPHILS NFR BLD AUTO: 0.4 % (ref 0–2)
DEPRECATED RDW RBC AUTO: 39.8 FL (ref 36.4–46.3)
EOSINOPHIL # BLD AUTO: 0.09 10*3/MM3 (ref 0–0.7)
EOSINOPHIL NFR BLD AUTO: 1 % (ref 0–7)
ERYTHROCYTE [DISTWIDTH] IN BLOOD BY AUTOMATED COUNT: 13.8 % (ref 11.5–14.5)
HCG INTACT+B SERPL-ACNC: <2.4 MIU/ML
HCT VFR BLD AUTO: 40.6 % (ref 35–45)
HGB BLD-MCNC: 13.2 G/DL (ref 12–15.5)
IMM GRANULOCYTES # BLD: 0.03 10*3/MM3 (ref 0–0.02)
IMM GRANULOCYTES NFR BLD: 0.3 % (ref 0–0.5)
LYMPHOCYTES # BLD AUTO: 2.74 10*3/MM3 (ref 0.6–4.2)
LYMPHOCYTES NFR BLD AUTO: 28.9 % (ref 10–50)
MCH RBC QN AUTO: 26 PG (ref 26.5–34)
MCHC RBC AUTO-ENTMCNC: 32.5 G/DL (ref 31.4–36)
MCV RBC AUTO: 80.1 FL (ref 80–98)
MONOCYTES # BLD AUTO: 0.48 10*3/MM3 (ref 0–0.9)
MONOCYTES NFR BLD AUTO: 5.1 % (ref 0–12)
NEUTROPHILS # BLD AUTO: 6.09 10*3/MM3 (ref 2–8.6)
NEUTROPHILS NFR BLD AUTO: 64.3 % (ref 37–80)
PLATELET # BLD AUTO: 278 10*3/MM3 (ref 150–450)
PMV BLD AUTO: 11.5 FL (ref 8–12)
RBC # BLD AUTO: 5.07 10*6/MM3 (ref 3.77–5.16)
WBC NRBC COR # BLD: 9.47 10*3/MM3 (ref 3.2–9.8)

## 2018-09-13 PROCEDURE — 36415 COLL VENOUS BLD VENIPUNCTURE: CPT

## 2018-09-13 PROCEDURE — 85025 COMPLETE CBC W/AUTO DIFF WBC: CPT

## 2018-09-13 PROCEDURE — 84702 CHORIONIC GONADOTROPIN TEST: CPT

## 2018-09-27 ENCOUNTER — OFFICE VISIT (OUTPATIENT)
Dept: OBSTETRICS AND GYNECOLOGY | Facility: CLINIC | Age: 23
End: 2018-09-27

## 2018-09-27 ENCOUNTER — APPOINTMENT (OUTPATIENT)
Dept: LAB | Facility: HOSPITAL | Age: 23
End: 2018-09-27

## 2018-09-27 VITALS
BODY MASS INDEX: 32.25 KG/M2 | WEIGHT: 182 LBS | HEART RATE: 87 BPM | HEIGHT: 63 IN | SYSTOLIC BLOOD PRESSURE: 123 MMHG | DIASTOLIC BLOOD PRESSURE: 81 MMHG

## 2018-09-27 DIAGNOSIS — N92.1 MENORRHAGIA WITH IRREGULAR CYCLE: ICD-10-CM

## 2018-09-27 DIAGNOSIS — Z01.419 ENCOUNTER FOR GYNECOLOGICAL EXAMINATION WITHOUT ABNORMAL FINDING: Primary | ICD-10-CM

## 2018-09-27 LAB
ALBUMIN SERPL-MCNC: 4.3 G/DL (ref 3.4–4.8)
ALBUMIN/GLOB SERPL: 1.3 G/DL (ref 1.1–1.8)
ALP SERPL-CCNC: 69 U/L (ref 38–126)
ALT SERPL W P-5'-P-CCNC: 27 U/L (ref 9–52)
ANION GAP SERPL CALCULATED.3IONS-SCNC: 11 MMOL/L (ref 5–15)
ARTICHOKE IGE QN: 80 MG/DL (ref 1–129)
AST SERPL-CCNC: 26 U/L (ref 14–36)
BILIRUB SERPL-MCNC: 0.4 MG/DL (ref 0.2–1.3)
BUN BLD-MCNC: 9 MG/DL (ref 7–21)
BUN/CREAT SERPL: 13.4 (ref 7–25)
CALCIUM SPEC-SCNC: 9 MG/DL (ref 8.4–10.2)
CHLORIDE SERPL-SCNC: 97 MMOL/L (ref 95–110)
CHOLEST SERPL-MCNC: 156 MG/DL (ref 0–199)
CO2 SERPL-SCNC: 27 MMOL/L (ref 22–31)
CREAT BLD-MCNC: 0.67 MG/DL (ref 0.5–1)
GFR SERPL CREATININE-BSD FRML MDRD: 110 ML/MIN/1.73 (ref 71–165)
GLOBULIN UR ELPH-MCNC: 3.2 GM/DL (ref 2.3–3.5)
GLUCOSE BLD-MCNC: 94 MG/DL (ref 60–100)
HBA1C MFR BLD: 5.5 % (ref 4–5.6)
HDLC SERPL-MCNC: 59 MG/DL (ref 60–200)
LDLC/HDLC SERPL: 1.43 {RATIO} (ref 0–3.22)
POTASSIUM BLD-SCNC: 3.9 MMOL/L (ref 3.5–5.1)
PROT SERPL-MCNC: 7.5 G/DL (ref 6.3–8.6)
SODIUM BLD-SCNC: 135 MMOL/L (ref 137–145)
TRIGL SERPL-MCNC: 63 MG/DL (ref 20–199)
TSH SERPL DL<=0.05 MIU/L-ACNC: 2.12 MIU/ML (ref 0.46–4.68)

## 2018-09-27 PROCEDURE — 83036 HEMOGLOBIN GLYCOSYLATED A1C: CPT | Performed by: NURSE PRACTITIONER

## 2018-09-27 PROCEDURE — 80061 LIPID PANEL: CPT | Performed by: NURSE PRACTITIONER

## 2018-09-27 PROCEDURE — 84443 ASSAY THYROID STIM HORMONE: CPT | Performed by: NURSE PRACTITIONER

## 2018-09-27 PROCEDURE — 80053 COMPREHEN METABOLIC PANEL: CPT | Performed by: NURSE PRACTITIONER

## 2018-09-27 PROCEDURE — 84403 ASSAY OF TOTAL TESTOSTERONE: CPT | Performed by: NURSE PRACTITIONER

## 2018-09-27 PROCEDURE — 83525 ASSAY OF INSULIN: CPT | Performed by: NURSE PRACTITIONER

## 2018-09-27 PROCEDURE — 84270 ASSAY OF SEX HORMONE GLOBUL: CPT | Performed by: NURSE PRACTITIONER

## 2018-09-27 PROCEDURE — 36415 COLL VENOUS BLD VENIPUNCTURE: CPT | Performed by: NURSE PRACTITIONER

## 2018-09-27 PROCEDURE — 84144 ASSAY OF PROGESTERONE: CPT | Performed by: NURSE PRACTITIONER

## 2018-09-27 PROCEDURE — 83002 ASSAY OF GONADOTROPIN (LH): CPT | Performed by: NURSE PRACTITIONER

## 2018-09-27 PROCEDURE — 84402 ASSAY OF FREE TESTOSTERONE: CPT | Performed by: NURSE PRACTITIONER

## 2018-09-27 PROCEDURE — 99385 PREV VISIT NEW AGE 18-39: CPT | Performed by: NURSE PRACTITIONER

## 2018-09-27 PROCEDURE — G0123 SCREEN CERV/VAG THIN LAYER: HCPCS | Performed by: NURSE PRACTITIONER

## 2018-09-27 RX ORDER — FOLIC ACID 1 MG/1
1 TABLET ORAL DAILY
Qty: 90 TABLET | Refills: 4 | Status: SHIPPED | OUTPATIENT
Start: 2018-09-27 | End: 2019-02-27

## 2018-09-28 LAB
INSULIN SERPL-ACNC: 12.6 UIU/ML (ref 2.6–24.9)
LH SERPL-ACNC: 9.66 MIU/ML
PROGEST SERPL-MCNC: 0.2 NG/ML
SHBG SERPL-SCNC: 19 NMOL/L (ref 24.6–122)

## 2018-10-01 LAB
LAB AP CASE REPORT: NORMAL
LAB AP GYN ADDITIONAL INFORMATION: NORMAL
LAB AP GYN OTHER FINDINGS: NORMAL
PATH INTERP SPEC-IMP: NORMAL
STAT OF ADQ CVX/VAG CYTO-IMP: NORMAL
TESTOST FREE MFR SERPL: 2.38 % (ref 0.5–2.8)
TESTOST FREE SERPL-MCNC: 0.68 NG/DL (ref 0.1–0.85)
TESTOST SERPL-MCNC: 28.4 NG/DL (ref 10–55)

## 2018-10-03 RX ORDER — NORETHINDRONE ACETATE AND ETHINYL ESTRADIOL 1MG-20(21)
1 KIT ORAL DAILY
Qty: 28 TABLET | Refills: 12 | Status: SHIPPED | OUTPATIENT
Start: 2018-10-03 | End: 2019-02-04

## 2018-10-03 RX ORDER — METRONIDAZOLE 500 MG/1
500 TABLET ORAL 2 TIMES DAILY
Qty: 14 TABLET | Refills: 0 | Status: SHIPPED | OUTPATIENT
Start: 2018-10-03 | End: 2018-10-10

## 2019-03-13 ENCOUNTER — OFFICE VISIT (OUTPATIENT)
Dept: FAMILY MEDICINE CLINIC | Facility: CLINIC | Age: 24
End: 2019-03-13

## 2019-03-13 VITALS
SYSTOLIC BLOOD PRESSURE: 110 MMHG | OXYGEN SATURATION: 99 % | BODY MASS INDEX: 32.51 KG/M2 | HEART RATE: 80 BPM | WEIGHT: 183.5 LBS | DIASTOLIC BLOOD PRESSURE: 82 MMHG | HEIGHT: 63 IN

## 2019-03-13 DIAGNOSIS — G43.809 OTHER MIGRAINE WITHOUT STATUS MIGRAINOSUS, NOT INTRACTABLE: Primary | ICD-10-CM

## 2019-03-13 PROCEDURE — 99203 OFFICE O/P NEW LOW 30 MIN: CPT | Performed by: STUDENT IN AN ORGANIZED HEALTH CARE EDUCATION/TRAINING PROGRAM

## 2019-03-13 RX ORDER — SUMATRIPTAN 50 MG/1
TABLET, FILM COATED ORAL
Qty: 10 TABLET | Refills: 0 | Status: SHIPPED | OUTPATIENT
Start: 2019-03-13 | End: 2019-08-17

## 2019-03-18 NOTE — PROGRESS NOTES
I have reviewed the patient.  I have reviewed the notes, assessments, and/or procedures performed by Dr Eliu Ch, I concur with her  documentation and assessment and plan for Yumiko Cooley.          This document has been electronically signed by Av Holman MD on March 18, 2019 10:49 AM

## 2019-03-18 NOTE — PROGRESS NOTES
ID: Yumiko Cooley    CC:   Chief Complaint   Patient presents with   • Headache     PERSISTENT HEADACHES        Subjective:     HPI     Yumiko Cooley is a 23 y.o. female who presents for migraines. Patient states she has had migraines for the past several years. She has only taken over the counter medications for this, which have not resolved the pain. She states when they come they last about a few days before fully resolving. Patient describes it as a dull pain that is more in the back of her head and neck. She rates the pain a 6/10. She does have photophobia and nausea when she has these migraines. She does not have any nasal discharge or eye tearing. Patient does not have any other symptoms.     Preventative:  Over the past 2 weeks, have you felt down, depressed, or hopeless? no  Over the past 2 weeks, have you felt little interest or pleasure in doing things? no  Clinical depression screening refused by patient no    On osteoporosis therapy? no    Past Medical Hx:  Past Medical History:   Diagnosis Date   • Anxiety    • SVT (supraventricular tachycardia) (CMS/[facility])     s/p ablation November 2015   • Varicella        Past Surgical Hx:  Past Surgical History:   Procedure Laterality Date   • ANKLE SURGERY  12/01/2010   • APPENDECTOMY  11/2012   • CARDIAC ABLATION  11/2015    for SVT       Health Maintenance:  Health Maintenance   Topic Date Due   • ANNUAL PHYSICAL  04/05/1999   • PAP SMEAR  09/27/2021   • TDAP/TD VACCINES (2 - Td) 08/07/2024   • INFLUENZA VACCINE  Completed   • MENINGOCOCCAL VACCINE (Normal Risk)  Completed   • HPV VACCINES  Completed       Current Meds:    Current Outpatient Medications:   •  estropipate (ORTHO-EST 1.25) 1.5 MG tablet, Take 1.5 mg by mouth Daily., Disp: , Rfl:   •  SUMAtriptan (IMITREX) 50 MG tablet, Take one tablet at onset of headache. May repeat dose one time in 2 hours if headache not relieved., Disp: 10 tablet, Rfl: 0    Allergies:  Vancomycin and  Tamiflu [oseltamivir phosphate]    Family Hx:  Family History   Problem Relation Age of Onset   • Depression Mother    • Hypertension Father    • No Known Problems Brother    • No Known Problems Sister    • Uterine cancer Paternal Grandmother    • Colon cancer Maternal Grandmother         Social History:  Social History     Socioeconomic History   • Marital status:      Spouse name: Not on file   • Number of children: Not on file   • Years of education: Not on file   • Highest education level: Not on file   Social Needs   • Financial resource strain: Not on file   • Food insecurity - worry: Not on file   • Food insecurity - inability: Not on file   • Transportation needs - medical: Not on file   • Transportation needs - non-medical: Not on file   Occupational History   • Not on file   Tobacco Use   • Smoking status: Never Smoker   • Smokeless tobacco: Never Used   Substance and Sexual Activity   • Alcohol use: No   • Drug use: No   • Sexual activity: Yes     Partners: Male     Birth control/protection: None   Other Topics Concern   • Not on file   Social History Narrative   • Not on file       Review of Systems   Constitutional: Negative for activity change, appetite change, chills, fatigue and fever.   HENT: Negative for drooling, ear discharge, ear pain, facial swelling, hearing loss, mouth sores, rhinorrhea and sinus pain.    Eyes: Negative for pain, redness and itching.   Respiratory: Negative for cough, choking, chest tightness, shortness of breath and stridor.    Cardiovascular: Negative for chest pain, palpitations and leg swelling.   Gastrointestinal: Negative for abdominal distention, abdominal pain, anal bleeding, blood in stool, constipation, diarrhea and nausea.   Endocrine: Negative for heat intolerance, polydipsia and polyphagia.   Genitourinary: Negative for dysuria, flank pain, frequency and genital sores.   Musculoskeletal: Negative for back pain, gait problem, joint swelling and myalgias.  "  Skin: Negative for pallor and rash.   Neurological: Positive for headaches. Negative for seizures, facial asymmetry, speech difficulty, light-headedness and numbness.   Hematological: Negative for adenopathy. Does not bruise/bleed easily.   Psychiatric/Behavioral: Negative for confusion, decreased concentration, dysphoric mood and hallucinations. The patient is not nervous/anxious and is not hyperactive.            Objective:     /82   Pulse 80   Ht 160 cm (63\")   Wt 83.2 kg (183 lb 8 oz)   LMP 02/12/2019   SpO2 99%   BMI 32.51 kg/m²     Physical Exam   Constitutional: She is oriented to person, place, and time. She appears well-developed and well-nourished.   HENT:   Head: Normocephalic and atraumatic.   Right Ear: External ear normal.   Left Ear: External ear normal.   Nose: Nose normal.   Mouth/Throat: Oropharynx is clear and moist.   Eyes: Conjunctivae and EOM are normal. Pupils are equal, round, and reactive to light.   Neck: Normal range of motion. Neck supple.   Cardiovascular: Normal rate, regular rhythm, normal heart sounds and intact distal pulses.   Pulmonary/Chest: Effort normal and breath sounds normal.   Abdominal: Soft. Bowel sounds are normal.   Musculoskeletal: Normal range of motion.   Neurological: She is alert and oriented to person, place, and time.   Skin: Skin is warm and dry.   Psychiatric: She has a normal mood and affect. Her behavior is normal. Judgment and thought content normal.            Assessment/Plan:     Migraines:   - imitrex, if does not work will change to different medication       Follow-up:     No Follow-up on file.      Goals:   Goals     • Reduce fat intake/eat healthier (pt-stated)      Barriers:  Likes [employer], but will try to eat it less frequently, e.g. Once every 2 weeks instead of weekly          Barriers to goals: obesity     Health Maintenance   Topic Date Due   • ANNUAL PHYSICAL  04/05/1999   • PAP SMEAR  09/27/2021   • TDAP/TD VACCINES (2 - Td) " 08/07/2024   • INFLUENZA VACCINE  Completed   • MENINGOCOCCAL VACCINE (Normal Risk)  Completed   • HPV VACCINES  Completed       Tobacco: nonsmoker  Alcohol: occasional/rare  Lifestyle: Patient's Body mass index is 32.51 kg/m². BMI is above normal parameters. Recommendations include: exercise counseling and nutrition counseling.   eat more fruits and vegetables, decrease soda or juice intake, increase water intake, increase physical activity, reduce screen time, reduce portion size, cut out extra servings and reduce fast food intake    RISK SCORE: 3         Edilia Ch M.D. PGY1  [facility]  [address] [place] [address]  Office: [phone]    This document has been electronically signed by Edilia Ch MD on March 18, 2019 7:35 AM          This document has been electronically signed by Edilia Ch MD on March 18, 2019 7:31 AM

## 2019-09-27 ENCOUNTER — OFFICE VISIT (OUTPATIENT)
Dept: OBSTETRICS AND GYNECOLOGY | Facility: CLINIC | Age: 24
End: 2019-09-27

## 2019-09-27 VITALS
WEIGHT: 190 LBS | DIASTOLIC BLOOD PRESSURE: 81 MMHG | HEART RATE: 83 BPM | HEIGHT: 63 IN | BODY MASS INDEX: 33.66 KG/M2 | SYSTOLIC BLOOD PRESSURE: 132 MMHG

## 2019-09-27 DIAGNOSIS — N97.0 ANOVULAR MENSTRUATION: Primary | ICD-10-CM

## 2019-09-27 PROCEDURE — 99213 OFFICE O/P EST LOW 20 MIN: CPT | Performed by: NURSE PRACTITIONER

## 2019-09-27 RX ORDER — FOLIC ACID 1 MG/1
1000 TABLET ORAL DAILY
Refills: 4 | COMMUNITY
Start: 2019-09-25 | End: 2020-01-18

## 2019-09-27 NOTE — PROGRESS NOTES
Subjective   Yumiko MazariegosJustoJohn is a 24 y.o. here with her husban, Jimmy Lopez, to discuss fertility medications.    LMP- 9/4/19    Dx with PCOS w/o insulin resistance in September 2018. Took a few months of birth control to help get periods regulated and they have been regular since then.    States that she is having 30 day cycles and has been using home ovulation tests and has seen a rise in the LH but is not getting a peak result. She is having timed intercourse during her fertile week. She and her  have been trying to get pregnant for 2-3 years and have never conceived. He denies any hx of testicular procedures as a child but was born with pyloric stenosis. He does not smoke or use smokeless tobacco but does drink alcohol on occasion. He has never had a semen analysis.      Other   This is a chronic problem. The current episode started more than 1 year ago. The problem occurs constantly. The problem has been unchanged. Pertinent negatives include no abdominal pain, chest pain, diaphoresis, fatigue or myalgias. Nothing aggravates the symptoms. She has tried nothing for the symptoms. The treatment provided no relief.       The following portions of the patient's history were reviewed and updated as appropriate: allergies, current medications, past family history, past medical history, past social history, past surgical history and problem list.    Review of Systems   Constitutional: Negative for activity change, appetite change, diaphoresis, fatigue, unexpected weight gain and unexpected weight loss.   Respiratory: Negative for chest tightness and shortness of breath.    Cardiovascular: Negative for chest pain and palpitations.   Gastrointestinal: Negative for abdominal distention, abdominal pain, constipation and diarrhea.   Genitourinary: Negative for amenorrhea, breast discharge, breast lump, breast pain, decreased libido, dyspareunia, dysuria, menstrual problem, pelvic pain, vaginal bleeding,  vaginal discharge and vaginal pain.   Musculoskeletal: Negative for myalgias.   Skin: Negative for color change, dry skin and skin lesions.   Neurological: Negative for light-headedness and headache.   Psychiatric/Behavioral: Negative for agitation, dysphoric mood, sleep disturbance, depressed mood and stress. The patient is not nervous/anxious.        Objective   Physical Exam   Constitutional: She is oriented to person, place, and time. Vital signs are normal. She appears well-developed and well-nourished. No distress.   Cardiovascular: Normal rate.   Pulmonary/Chest: Effort normal.   Neurological: She is alert and oriented to person, place, and time.   Skin: Skin is warm and dry. She is not diaphoretic.   Psychiatric: She has a normal mood and affect. Her behavior is normal.   Nursing note and vitals reviewed.    Component      Latest Ref Rng & Units 9/27/2018   Glucose      60 - 100 mg/dL 94   BUN      7 - 21 mg/dL 9   Creatinine      0.50 - 1.00 mg/dL 0.67   Sodium      137 - 145 mmol/L 135 (L)   Potassium      3.5 - 5.1 mmol/L 3.9   Chloride      95 - 110 mmol/L 97   CO2      22.0 - 31.0 mmol/L 27.0   Calcium      8.4 - 10.2 mg/dL 9.0   Total Protein      6.3 - 8.6 g/dL 7.5   Albumin      3.40 - 4.80 g/dL 4.30   ALT (SGPT)      9 - 52 U/L 27   AST (SGOT)      14 - 36 U/L 26   Alkaline Phosphatase      38 - 126 U/L 69   Total Bilirubin      0.2 - 1.3 mg/dL 0.4   eGFR Non  Am      71 - 165 mL/min/1.73 110   Globulin      2.3 - 3.5 gm/dL 3.2   A/G Ratio      1.1 - 1.8 g/dL 1.3   BUN/Creatinine Ratio      7.0 - 25.0 13.4   Anion Gap      5.0 - 15.0 mmol/L 11.0   Total Cholesterol      0 - 199 mg/dL 156   Triglycerides      20 - 199 mg/dL 63   HDL Cholesterol      60 - 200 mg/dL 59 (L)   LDL Cholesterol       1 - 129 mg/dL 80   LDL/HDL Ratio      0.00 - 3.22 1.43   Testosterone, Total      10.0 - 55.0 ng/dL 28.4   Testosterone, Free      0.10 - 0.85 ng/dL 0.68   Testosterone, Free %      0.50 - 2.80 % 2.38    Hemoglobin A1C      4 - 5.6 % 5.5   Insulin      2.6 - 24.9 uIU/mL 12.6   LH      mIU/mL 9.66   Progesterone      ng/mL 0.2   TSH Baseline      0.460 - 4.680 mIU/mL 2.120   Sex Hormone Binding Globulin      24.6 - 122.0 nmol/L 19.0 (L)         Assessment/Plan   Yumiko was seen today for polycystic ovary syndrome.    Diagnoses and all orders for this visit:    Anovular menstruation    Other orders  -     clomiPHENE (CLOMID) 50 MG tablet; Take 1 tablet by mouth daily on cycle days 5-9.      No insulin resistance. Counseling was given to patient and  for the following topics: instructions for management, patient and family education, risks and benefits of treatment options and importance of treatment compliance . Total time of the encounter was 15 minutes and 10 minutes was spent counseling.    R/B/A and potential s/e of Clomid reviewed. Written instructions for clomid use provided and reviewed. Jimmy will have his SA done ASAP; will plan to call with results. ( 10/23/94)

## 2019-12-17 ENCOUNTER — TELEPHONE (OUTPATIENT)
Dept: OBSTETRICS AND GYNECOLOGY | Facility: CLINIC | Age: 24
End: 2019-12-17

## 2019-12-17 NOTE — TELEPHONE ENCOUNTER
PT HAD AN APPOINTMENT WITH WITH DR D'AMICO AND HER OFFICE HAS CLOSED AND SO NOW HE NEEDS A REFERRAL TO DR SHUKLA FOR LOW SPERM COUNT.  PLEASE RETURN HIS CALL AND MAKE THE REFERRAL FOR HIM.  [phone].

## 2020-01-18 PROBLEM — J11.1 INFLUENZA-LIKE ILLNESS: Status: ACTIVE | Noted: 2020-01-18

## 2020-01-18 PROBLEM — Z20.828 EXPOSURE TO INFLUENZA: Status: ACTIVE | Noted: 2020-01-18

## 2020-03-25 ENCOUNTER — LAB (OUTPATIENT)
Dept: LAB | Facility: HOSPITAL | Age: 25
End: 2020-03-25

## 2020-03-25 ENCOUNTER — OFFICE VISIT (OUTPATIENT)
Dept: FAMILY MEDICINE CLINIC | Facility: CLINIC | Age: 25
End: 2020-03-25

## 2020-03-25 VITALS
HEIGHT: 63 IN | TEMPERATURE: 99 F | OXYGEN SATURATION: 99 % | DIASTOLIC BLOOD PRESSURE: 74 MMHG | SYSTOLIC BLOOD PRESSURE: 118 MMHG | HEART RATE: 91 BPM | WEIGHT: 180 LBS | BODY MASS INDEX: 31.89 KG/M2

## 2020-03-25 DIAGNOSIS — K92.1 HEMATOCHEZIA: ICD-10-CM

## 2020-03-25 DIAGNOSIS — K92.1 HEMATOCHEZIA: Primary | ICD-10-CM

## 2020-03-25 LAB
BASOPHILS # BLD AUTO: 0.06 10*3/MM3 (ref 0–0.2)
BASOPHILS NFR BLD AUTO: 0.5 % (ref 0–1.5)
DEPRECATED RDW RBC AUTO: 36.7 FL (ref 37–54)
EOSINOPHIL # BLD AUTO: 0.19 10*3/MM3 (ref 0–0.4)
EOSINOPHIL NFR BLD AUTO: 1.6 % (ref 0.3–6.2)
ERYTHROCYTE [DISTWIDTH] IN BLOOD BY AUTOMATED COUNT: 13.1 % (ref 12.3–15.4)
HCT VFR BLD AUTO: 40.3 % (ref 34–46.6)
HGB BLD-MCNC: 13.5 G/DL (ref 12–15.9)
IMM GRANULOCYTES # BLD AUTO: 0.09 10*3/MM3 (ref 0–0.05)
IMM GRANULOCYTES NFR BLD AUTO: 0.8 % (ref 0–0.5)
LYMPHOCYTES # BLD AUTO: 2.47 10*3/MM3 (ref 0.7–3.1)
LYMPHOCYTES NFR BLD AUTO: 21.4 % (ref 19.6–45.3)
MCH RBC QN AUTO: 26 PG (ref 26.6–33)
MCHC RBC AUTO-ENTMCNC: 33.5 G/DL (ref 31.5–35.7)
MCV RBC AUTO: 77.6 FL (ref 79–97)
MONOCYTES # BLD AUTO: 0.64 10*3/MM3 (ref 0.1–0.9)
MONOCYTES NFR BLD AUTO: 5.5 % (ref 5–12)
NEUTROPHILS # BLD AUTO: 8.1 10*3/MM3 (ref 1.7–7)
NEUTROPHILS NFR BLD AUTO: 70.2 % (ref 42.7–76)
NRBC BLD AUTO-RTO: 0 /100 WBC (ref 0–0.2)
PLATELET # BLD AUTO: 388 10*3/MM3 (ref 140–450)
PMV BLD AUTO: 11.6 FL (ref 6–12)
RBC # BLD AUTO: 5.19 10*6/MM3 (ref 3.77–5.28)
WBC NRBC COR # BLD: 11.55 10*3/MM3 (ref 3.4–10.8)

## 2020-03-25 PROCEDURE — 99213 OFFICE O/P EST LOW 20 MIN: CPT | Performed by: STUDENT IN AN ORGANIZED HEALTH CARE EDUCATION/TRAINING PROGRAM

## 2020-03-25 PROCEDURE — 85025 COMPLETE CBC W/AUTO DIFF WBC: CPT

## 2020-03-25 PROCEDURE — 36415 COLL VENOUS BLD VENIPUNCTURE: CPT

## 2020-03-25 NOTE — PROGRESS NOTES
Family Medicine Residency  Levy Zelaya MD    Subjective:     Yumiko Cooley is a 24 y.o. female who is here for a walk-in visit.  She states that 1 week ago she developed a rash on her face for which she saw a physician.  She was treated with steroids.  The rash cleared up last weekend however left her face dry.  Around the same time 1 week ago she started developing nausea and diarrhea after eating.  She states that her throat swells after eating food.  This morning when she woke up she noticed blood in her loose stool.  Her stool is brown-colored.  She noticed that the toilet bowl was red this morning.  She denies any changes to her diet 1 week ago.  She has been taking Benadryl at nighttime.  Patient states that she is currently feeling nauseous and her throat is feeling puffy today.  The following portions of the patient's history were reviewed and updated as appropriate: allergies, current medications, past family history, past medical history, past social history, past surgical history and problem list.    Past Medical Hx:  Past Medical History:   Diagnosis Date   • Anxiety    • SVT (supraventricular tachycardia) (CMS/[facility])     s/p ablation November 2015   • Varicella        Past Surgical Hx:  Past Surgical History:   Procedure Laterality Date   • ANKLE SURGERY  12/01/2010   • APPENDECTOMY  11/2012   • CARDIAC ABLATION  11/2015    for SVT       Current Meds:    Current Outpatient Medications:   •  brompheniramine-pseudoephedrine-DM (BROMFED DM) 30-2-10 MG/5ML syrup, Take one tsp BID prn cough and congestion, Disp: 120 mL, Rfl: 0  •  ondansetron ODT (ZOFRAN-ODT) 4 MG disintegrating tablet, Take 1 tablet by mouth Every 8 (Eight) Hours As Needed for Nausea or Vomiting for up to 8 doses., Disp: 8 tablet, Rfl: 0    Allergies:  Allergies   Allergen Reactions   • Tamiflu [Oseltamivir Phosphate] Rash   • Vancomycin Itching       Family Hx:  Family History   Problem Relation Age of Onset   • Depression  "Mother    • Hypertension Father    • No Known Problems Brother    • No Known Problems Sister    • Uterine cancer Paternal Grandmother    • Colon cancer Maternal Grandmother    • Colon cancer Maternal Grandfather         Social History:  Social History     Socioeconomic History   • Marital status:      Spouse name: Not on file   • Number of children: Not on file   • Years of education: Not on file   • Highest education level: Not on file   Tobacco Use   • Smoking status: Never Smoker   • Smokeless tobacco: Never Used   Substance and Sexual Activity   • Alcohol use: No   • Drug use: No   • Sexual activity: Yes     Partners: Male     Birth control/protection: None       Review of Systems  Review of Systems   Constitutional: Positive for activity change and fatigue. Negative for chills, diaphoresis and fever.        Loss of appetite   HENT: Positive for sore throat and trouble swallowing. Negative for congestion, drooling, facial swelling, postnasal drip, rhinorrhea, sinus pressure, sinus pain and sneezing.         Trouble swallowing after eating   Respiratory: Negative for apnea, cough, choking, shortness of breath and wheezing.    Cardiovascular: Negative for chest pain.   Gastrointestinal: Positive for abdominal pain, blood in stool, diarrhea and nausea. Negative for anal bleeding, constipation, rectal pain and vomiting.        Abdominal cramping started today, shoots side to side   Musculoskeletal: Negative for back pain, myalgias and neck pain.   Allergic/Immunologic: Negative for food allergies.       Objective:     /74   Pulse 91   Temp 99 °F (37.2 °C)   Ht 160 cm (63\")   Wt 81.6 kg (180 lb)   SpO2 99%   BMI 31.89 kg/m²   Physical Exam   Constitutional: She is oriented to person, place, and time. She appears well-developed and well-nourished.   HENT:   Head: Normocephalic and atraumatic.   Right Ear: External ear normal.   Left Ear: External ear normal.   Nose: Nose normal.   Mouth/Throat: " Oropharynx is clear and moist.   Eyes: Pupils are equal, round, and reactive to light. Conjunctivae and EOM are normal.   Neck: Normal range of motion. Neck supple.   Cardiovascular: Normal rate, regular rhythm, normal heart sounds and intact distal pulses. Exam reveals no gallop and no friction rub.   No murmur heard.  Pulmonary/Chest: Effort normal and breath sounds normal. No stridor. No respiratory distress. She has no wheezes. She exhibits no tenderness.   Abdominal: Soft. Bowel sounds are normal. She exhibits no distension and no mass. There is no tenderness. There is no guarding.   Musculoskeletal: Normal range of motion. She exhibits no edema.   Neurological: She is alert and oriented to person, place, and time. No cranial nerve deficit.   Skin: Skin is warm and dry.   Psychiatric: She has a normal mood and affect. Her behavior is normal. Judgment and thought content normal.        Assessment/Plan:     Patient is a 24-year-old female who was seen today for a viral URI and hematochezia.  She will follow-up with Dr. Edilia Ch in 1 month assuming her symptoms resolve and her CBC is within normal limits    Diagnoses and all orders for this visit:    Viral URI        -     For now we will treat this is a viral pneumonia.  Encourage social distancing as well as good hand hygiene.    Hematochezia: Will complete CBC today.  Will call patient regarding her results.  I told the patient to observe her symptoms for a week and let us know if there is no improvement or she starts developing additional symptoms.  -     CBC       Follow-up:     Return in about 1 month (around 4/25/2020).    Preventative:  Health Maintenance   Topic Date Due   • ANNUAL PHYSICAL  04/05/1999   • HEPATITIS C SCREENING  09/27/2017   • CHLAMYDIA SCREENING  09/27/2017   • INFLUENZA VACCINE  08/01/2019   • PAP SMEAR  09/27/2021   • TDAP/TD VACCINES (2 - Td) 08/07/2024   • HPV VACCINES  Completed         Alcohol use:  reports that she does not  drink alcohol.  Nicotine status  reports that she has never smoked. She has never used smokeless tobacco.    Goals     • Reduce fat intake/eat healthier (pt-stated)      Barriers:  Likes [employer], but will try to eat it less frequently, e.g. Once every 2 weeks instead of weekly            RISK SCORE: 3              This document has been electronically signed by Levy Zelaya MD on March 25, 2020 11:51

## 2020-03-25 NOTE — PROGRESS NOTES
I have reviewed the notes, assessments, and/or procedures performed by dr Zelaya, I concur with her/his documentation and assessment and plan for Yumiko Cooley.                This document has been electronically signed by Won Brunson MD on March 25, 2020 14:52

## 2020-04-01 RX ORDER — FOLIC ACID 1 MG/1
TABLET ORAL
Qty: 90 TABLET | Refills: 3 | Status: SHIPPED | OUTPATIENT
Start: 2020-04-01 | End: 2020-07-30

## 2020-04-02 ENCOUNTER — TELEPHONE (OUTPATIENT)
Dept: FAMILY MEDICINE CLINIC | Facility: CLINIC | Age: 25
End: 2020-04-02

## 2020-04-02 DIAGNOSIS — T78.40XA ALLERGIC STATE, INITIAL ENCOUNTER: Primary | ICD-10-CM

## 2020-04-02 NOTE — TELEPHONE ENCOUNTER
Pt called and said she would like a referral sent to the Allergy Department at [facility].     If any questions, please call [phone]    Thank you

## 2020-04-02 NOTE — TELEPHONE ENCOUNTER
Pt called and said she would like a referral sent to the Allergy Department at [facility].      If any questions, please call [phone]     Thank you

## 2020-04-15 ENCOUNTER — TELEPHONE (OUTPATIENT)
Dept: OBSTETRICS AND GYNECOLOGY | Facility: CLINIC | Age: 25
End: 2020-04-15

## 2020-04-15 NOTE — TELEPHONE ENCOUNTER
Never started clomid back in September because her  has a varicocele. He has been on an antibiotic and the urologist wants him to have surgery after the pandemic but states that they can continue to TTC during this time because the antibiotic may open the tube enough for them to conceive. She was wanting to know if she could get a new Rx for the clomid. Rx R/B/A and potential s/e reviewed and sent to [facility]. Pt to call for refills as needed.

## 2020-04-15 NOTE — TELEPHONE ENCOUNTER
Patient called and wanted to know if she should start taking CLOMID. Her  is being treated with medication for a blockage and will not have surgery until June or July...

## 2020-04-20 ENCOUNTER — TELEPHONE (OUTPATIENT)
Dept: OBSTETRICS AND GYNECOLOGY | Facility: CLINIC | Age: 25
End: 2020-04-20

## 2020-04-26 ENCOUNTER — PATIENT MESSAGE (OUTPATIENT)
Dept: OBSTETRICS AND GYNECOLOGY | Facility: CLINIC | Age: 25
End: 2020-04-26

## 2020-04-27 NOTE — TELEPHONE ENCOUNTER
From: Yumiko Cooley  To: Marta Sethi APRN  Sent: 4/26/2020 7:51 PM CDT  Subject: Non-Urgent Medical Question    Cycle day 11- ovulation test was negative

## 2020-05-15 RX ORDER — SUMATRIPTAN 50 MG/1
TABLET, FILM COATED ORAL
Qty: 10 TABLET | Refills: 0 | Status: SHIPPED | OUTPATIENT
Start: 2020-05-15 | End: 2020-07-30 | Stop reason: SDDI

## 2020-07-30 ENCOUNTER — OFFICE VISIT (OUTPATIENT)
Dept: FAMILY MEDICINE CLINIC | Facility: CLINIC | Age: 25
End: 2020-07-30

## 2020-07-30 VITALS
BODY MASS INDEX: 36.11 KG/M2 | SYSTOLIC BLOOD PRESSURE: 118 MMHG | HEART RATE: 96 BPM | HEIGHT: 63 IN | OXYGEN SATURATION: 99 % | WEIGHT: 203.8 LBS | TEMPERATURE: 98.2 F | DIASTOLIC BLOOD PRESSURE: 80 MMHG

## 2020-07-30 DIAGNOSIS — K21.9 GASTROESOPHAGEAL REFLUX DISEASE, ESOPHAGITIS PRESENCE NOT SPECIFIED: Primary | ICD-10-CM

## 2020-07-30 PROCEDURE — 99213 OFFICE O/P EST LOW 20 MIN: CPT | Performed by: STUDENT IN AN ORGANIZED HEALTH CARE EDUCATION/TRAINING PROGRAM

## 2020-07-30 RX ORDER — FAMOTIDINE 20 MG/1
20 TABLET, FILM COATED ORAL 2 TIMES DAILY
Qty: 60 TABLET | Refills: 1 | Status: SHIPPED | OUTPATIENT
Start: 2020-07-30 | End: 2020-08-28 | Stop reason: SDUPTHER

## 2020-07-31 NOTE — PROGRESS NOTES
Family Medicine Residency  Mark Barba MD    Subjective:     Yumiko Cooley is a 25 y.o. female who presents for initial evaluation of heartburn and diarrhea. For the last 6 months she has been experiencing epigastric discomfort described as burning which radiates up her chest and gives her a bad taste in her mouth. She enjoys spicy foods but states burning occurs with all foods and drinks. She does not lay down immediately after meals.     Her diarrhea started 6 months ago as well and also has a remote history of constipation. Stool is not formed but not liquid and she has 2-3 episodes a day with 1-2 episodes a month of bright red blood mixed with stool. No history of hemorrhoids. Defecation does not relieve abdominal pain which is the same as above. She denies any fevers or recent travel.    The following portions of the patient's history were reviewed and updated as appropriate: allergies, current medications, past family history, past medical history, past social history, past surgical history and problem list.    Past Medical Hx:  Past Medical History:   Diagnosis Date   • Anxiety    • SVT (supraventricular tachycardia) (CMS/[facility])     s/p ablation 2015   • Varicella        Past Surgical Hx:  Past Surgical History:   Procedure Laterality Date   • ANKLE SURGERY  2010   • APPENDECTOMY  2012   • CARDIAC ABLATION  2015    for SVT       Current Meds:    Current Outpatient Medications:   •  Prenatal Multivit-Min-Fe-FA (PRE-ROSS PO), Take  by mouth., Disp: , Rfl:   •  famotidine (Pepcid) 20 MG tablet, Take 1 tablet by mouth 2 (Two) Times a Day., Disp: 60 tablet, Rfl: 1    Allergies:  Allergies   Allergen Reactions   • Tamiflu [Oseltamivir Phosphate] Rash   • Vancomycin Itching       Family Hx:  Family History   Problem Relation Age of Onset   • Depression Mother    • Hypertension Father    • No Known Problems Brother    • No Known Problems Sister    • Uterine cancer Paternal  "Grandmother    • Colon cancer Maternal Grandmother    • Colon cancer Maternal Grandfather         Social History:  Social History     Socioeconomic History   • Marital status:      Spouse name: Not on file   • Number of children: Not on file   • Years of education: Not on file   • Highest education level: Not on file   Tobacco Use   • Smoking status: Never Smoker   • Smokeless tobacco: Never Used   Substance and Sexual Activity   • Alcohol use: No   • Drug use: No   • Sexual activity: Yes     Partners: Male     Birth control/protection: None       Review of Systems  Review of Systems   Constitutional: Negative for activity change and appetite change.   HENT: Negative for congestion and trouble swallowing.    Respiratory: Negative for chest tightness and shortness of breath.    Cardiovascular: Negative for chest pain and palpitations.   Gastrointestinal: Positive for abdominal pain, blood in stool, diarrhea and nausea. Negative for abdominal distention and vomiting.   Genitourinary: Negative for difficulty urinating and dysuria.   Musculoskeletal: Negative for arthralgias and myalgias.   Skin: Negative for color change and pallor.   Neurological: Negative for dizziness, light-headedness and headaches.   Psychiatric/Behavioral: Negative for agitation and behavioral problems.       Objective:     /80   Pulse 96   Temp 98.2 °F (36.8 °C)   Ht 160 cm (63\")   Wt 92.4 kg (203 lb 12.8 oz)   SpO2 99%   Breastfeeding No   BMI 36.10 kg/m²   Physical Exam   Constitutional: She is oriented to person, place, and time. She appears well-developed and well-nourished. No distress.   HENT:   Head: Normocephalic and atraumatic.   Right Ear: External ear normal.   Left Ear: External ear normal.   Nose: Nose normal.   Eyes: Pupils are equal, round, and reactive to light. EOM are normal.   Neck: Normal range of motion. Neck supple.   Cardiovascular: Normal rate, regular rhythm and normal heart sounds. Exam reveals no " gallop and no friction rub.   No murmur heard.  Pulmonary/Chest: Effort normal and breath sounds normal. No respiratory distress. She has no wheezes. She has no rales.   Abdominal: Soft. Bowel sounds are normal. She exhibits no distension. There is tenderness (Mild epigastric).   Musculoskeletal: Normal range of motion. She exhibits no edema.   Neurological: She is alert and oriented to person, place, and time.   Skin: Skin is warm. Capillary refill takes less than 2 seconds. No erythema.   Psychiatric: She has a normal mood and affect. Her behavior is normal.        Assessment/Plan:     Yumiko was seen today for abdominal pain and heartburn.    Diagnoses and all orders for this visit:    Gastroesophageal reflux disease, esophagitis presence not specified  -     famotidine (Pepcid) 20 MG tablet; Take 1 tablet by mouth 2 (Two) Times a Day.  - Decrease gastric irritants.   - Believe diarrhea and GERD may be related as started together. Will give H2 blocker and monitor symptoms.        · Rx changes: Started Famotidine 20mg BID  · Patient Education: Continue to monitor symptoms. Recommended decreasing ingestion of coffee, alcohol, and spicy foods. Can return to clinic sooner if symptoms worsen or fail to improve.   · Compliance at present is estimated to be excellent.   · Efforts to improve compliance (if necessary) will be directed at unnecessary at this time.     Follow-up:     Return in about 4 weeks (around 8/27/2020). Reassess GERD and diarrhea.    Preventative:  Health Maintenance   Topic Date Due   • ANNUAL PHYSICAL  04/05/1999   • PNEUMOCOCCAL VACCINE (19-64 MEDIUM RISK) (1 of 1 - PPSV23) 04/05/2015   • HEPATITIS C SCREENING  09/27/2017   • CHLAMYDIA SCREENING  09/27/2017   • INFLUENZA VACCINE  08/01/2020   • PAP SMEAR  09/27/2021   • TDAP/TD VACCINES (2 - Td) 08/07/2024   • HPV VACCINES  Completed       Recommended: none  Vaccine Counseling: N/A    Weight  -Class: Obese Class II: 35-39.9kg/m2  -Patient's Body  mass index is 36.1 kg/m². BMI is above normal parameters. Recommendations include: exercise counseling and nutrition counseling.   eat more fruits and vegetables, decrease soda or juice intake, increase water intake and increase physical activity    Alcohol use:  reports that she does not drink alcohol.  Nicotine status  reports that she has never smoked. She has never used smokeless tobacco.    Goals        Patient Stated    • Reduce fat intake/eat healthier (pt-stated)      Barriers:  Likes [employer], but will try to eat it less frequently, e.g. Once every 2 weeks instead of weekly            RISK SCORE: 3          This document has been electronically signed by Mark Barba MD on July 30, 2020 19:17

## 2020-07-31 NOTE — PROGRESS NOTES
I have reviewed the patient.  I have reviewed the notes, assessments, and/or procedures performed by Dr Mark Barba, I concur with his  documentation and assessment and plan for Yumiko Cooley.    I discussed the patient during the visit and did discuss assessment and plan for treatment and follow-up        This document has been electronically signed by Av Holman MD on July 31, 2020 09:15

## 2020-08-14 ENCOUNTER — PATIENT MESSAGE (OUTPATIENT)
Dept: OBSTETRICS AND GYNECOLOGY | Facility: CLINIC | Age: 25
End: 2020-08-14

## 2020-08-14 NOTE — TELEPHONE ENCOUNTER
From: Yumiko Cooley  To: Marta Sethi APRN  Sent: 8/14/2020 12:44 PM CDT  Subject: Non-Urgent Medical Question    Jay,   My name is Yumiko Lopez ([birth year]) and my  had a varicocelectomy and they have started him on clomid (Dr. Macdonald) and said I can also take it, and I was wondering if I could get a prescription sent to [facility].     Thank you!

## 2020-08-28 ENCOUNTER — OFFICE VISIT (OUTPATIENT)
Dept: FAMILY MEDICINE CLINIC | Facility: CLINIC | Age: 25
End: 2020-08-28

## 2020-08-28 VITALS
HEART RATE: 96 BPM | RESPIRATION RATE: 20 BRPM | BODY MASS INDEX: 35.6 KG/M2 | HEIGHT: 63 IN | TEMPERATURE: 98.3 F | OXYGEN SATURATION: 98 % | SYSTOLIC BLOOD PRESSURE: 136 MMHG | WEIGHT: 200.9 LBS | DIASTOLIC BLOOD PRESSURE: 82 MMHG

## 2020-08-28 DIAGNOSIS — K21.9 GASTROESOPHAGEAL REFLUX DISEASE, ESOPHAGITIS PRESENCE NOT SPECIFIED: ICD-10-CM

## 2020-08-28 PROCEDURE — 99213 OFFICE O/P EST LOW 20 MIN: CPT | Performed by: STUDENT IN AN ORGANIZED HEALTH CARE EDUCATION/TRAINING PROGRAM

## 2020-08-28 RX ORDER — FAMOTIDINE 20 MG/1
20 TABLET, FILM COATED ORAL 2 TIMES DAILY
Qty: 60 TABLET | Refills: 6 | Status: SHIPPED | OUTPATIENT
Start: 2020-08-28 | End: 2021-04-09

## 2020-08-28 NOTE — PROGRESS NOTES
Family Medicine Residency  Mark Barba MD    Subjective:     Yumiko Cooley is a 25 y.o. female who presents for GERD. She was started on Pepcid 20mg BID and had resolution of epigastric pain and diarrhea aside from normal upset stomach and loose stools with foods that do not settle with her stomach. She has decreased caffeine intake and stopped alcohol intake.     The following portions of the patient's history were reviewed and updated as appropriate: allergies, current medications, past family history, past medical history, past social history, past surgical history and problem list.    Past Medical Hx:  Past Medical History:   Diagnosis Date   • Anxiety    • SVT (supraventricular tachycardia) (CMS/[facility])     s/p ablation 2015   • Varicella        Past Surgical Hx:  Past Surgical History:   Procedure Laterality Date   • ANKLE SURGERY  2010   • APPENDECTOMY  2012   • CARDIAC ABLATION  2015    for SVT       Current Meds:    Current Outpatient Medications:   •  famotidine (Pepcid) 20 MG tablet, Take 1 tablet by mouth 2 (Two) Times a Day., Disp: 60 tablet, Rfl: 6  •  Prenatal Multivit-Min-Fe-FA (PRE-ROSS PO), Take  by mouth., Disp: , Rfl:   •  clomiPHENE (CLOMID) 50 MG tablet, Take 1 tablet by mouth daily on cycle days 5-9., Disp: 5 tablet, Rfl: 0    Allergies:  Allergies   Allergen Reactions   • Tamiflu [Oseltamivir Phosphate] Rash   • Vancomycin Itching       Family Hx:  Family History   Problem Relation Age of Onset   • Depression Mother    • Hypertension Father    • No Known Problems Brother    • No Known Problems Sister    • Uterine cancer Paternal Grandmother    • Colon cancer Maternal Grandmother    • Colon cancer Maternal Grandfather         Social History:  Social History     Socioeconomic History   • Marital status:      Spouse name: Not on file   • Number of children: Not on file   • Years of education: Not on file   • Highest education level: Not on file    "  Tobacco Use   • Smoking status: Never Smoker   • Smokeless tobacco: Never Used   Substance and Sexual Activity   • Alcohol use: No   • Drug use: No   • Sexual activity: Yes     Partners: Male     Birth control/protection: None       Review of Systems  Review of Systems   Constitutional: Negative for activity change and appetite change.   HENT: Negative for congestion and trouble swallowing.    Respiratory: Negative for chest tightness and shortness of breath.    Cardiovascular: Negative for chest pain and palpitations.   Gastrointestinal: Negative for abdominal distention and abdominal pain.   Genitourinary: Negative for difficulty urinating and dysuria.   Musculoskeletal: Negative for arthralgias and myalgias.   Skin: Negative for color change and pallor.   Neurological: Negative for dizziness, light-headedness and headaches.   Psychiatric/Behavioral: Negative for agitation and behavioral problems.       Objective:     /82 (BP Location: Left arm, Patient Position: Sitting, Cuff Size: Adult)   Pulse 96   Temp 98.3 °F (36.8 °C) (Tympanic)   Resp 20   Ht 160 cm (63\")   Wt 91.1 kg (200 lb 14.4 oz)   LMP 08/18/2020   SpO2 98%   BMI 35.59 kg/m²   Physical Exam   Constitutional: She is oriented to person, place, and time. She appears well-developed and well-nourished. No distress.   HENT:   Head: Normocephalic and atraumatic.   Right Ear: External ear normal.   Left Ear: External ear normal.   Nose: Nose normal.   Eyes: Pupils are equal, round, and reactive to light. EOM are normal.   Neck: Normal range of motion. Neck supple.   Cardiovascular: Normal rate, regular rhythm and normal heart sounds. Exam reveals no gallop and no friction rub.   No murmur heard.  Pulmonary/Chest: Effort normal and breath sounds normal. No respiratory distress. She has no wheezes. She has no rales.   Abdominal: Soft. Bowel sounds are normal. She exhibits no distension. There is no tenderness.   Musculoskeletal: Normal range of " motion. She exhibits no edema.   Neurological: She is alert and oriented to person, place, and time.   Skin: Skin is warm. Capillary refill takes less than 2 seconds. No erythema.   Psychiatric: She has a normal mood and affect. Her behavior is normal.        Assessment/Plan:     Yumiko was seen today for heartburn.    Diagnoses and all orders for this visit:    Gastroesophageal reflux disease, esophagitis presence not specified  -     famotidine (Pepcid) 20 MG tablet; Take 1 tablet by mouth 2 (Two) Times a Day.  - Symptoms and associated diarrhea have resolved        · Rx changes: None  · Patient Education: Continue Pepcid and RTC if symptoms worsen or return.  · Compliance at present is estimated to be excellent.   · Efforts to improve compliance (if necessary) will be directed at unnecessary at this time.     Follow-up:     Return in about 6 months (around 2/28/2021) for GERD.    Preventative:  Health Maintenance   Topic Date Due   • ANNUAL PHYSICAL  04/05/1999   • PNEUMOCOCCAL VACCINE (19-64 MEDIUM RISK) (1 of 1 - PPSV23) 04/05/2015   • HEPATITIS C SCREENING  09/27/2017   • CHLAMYDIA SCREENING  09/27/2017   • INFLUENZA VACCINE  08/01/2020   • PAP SMEAR  09/27/2021   • TDAP/TD VACCINES (2 - Td) 08/07/2024   • HPV VACCINES  Completed       Recommended: none  Vaccine Counseling: N/A    Weight  -Class: Obese Class II: 35-39.9kg/m2  -Patient's Body mass index is 35.59 kg/m². BMI is above normal parameters. Recommendations include: exercise counseling and nutrition counseling.   eat more fruits and vegetables, decrease soda or juice intake, increase water intake and increase physical activity    Alcohol use:  reports that she does not drink alcohol.  Nicotine status  reports that she has never smoked. She has never used smokeless tobacco.    Goals        Patient Stated    • Reduce fat intake/eat healthier (pt-stated)      Barriers:  Likes [employer], but will try to eat it less frequently, e.g. Once every 2 weeks  instead of weekly            RISK SCORE: 2          This document has been electronically signed by Mark Barba MD on August 28, 2020 10:09

## 2020-08-28 NOTE — PROGRESS NOTES
I have reviewed the notes, assessments, and/or procedures performed by Dr. Barba, I concur with her/his documentation and assessment and plan for Yumiko Cooley.       This document has been electronically signed by Kolby Sen MD on August 28, 2020 11:42

## 2020-09-15 ENCOUNTER — PATIENT MESSAGE (OUTPATIENT)
Dept: OBSTETRICS AND GYNECOLOGY | Facility: CLINIC | Age: 25
End: 2020-09-15

## 2020-09-15 NOTE — TELEPHONE ENCOUNTER
From: Yumiko Coloey  To: JOHANA Rodriguez  Sent: 9/15/2020 2:33 PM CDT  Subject: Non-Urgent Medical Question    Negative pregnancy test on day 28  Started period on day 29

## 2020-09-17 ENCOUNTER — PATIENT MESSAGE (OUTPATIENT)
Dept: OBSTETRICS AND GYNECOLOGY | Facility: CLINIC | Age: 25
End: 2020-09-17

## 2020-09-17 NOTE — TELEPHONE ENCOUNTER
From: Yumiko Cooley  To: JOHANA Rodriguez  Sent: 9/17/2020 3:08 PM CDT  Subject: Non-Urgent Medical Question    Brandin Lozano!  As you know, my  and I are actively trying to conceive and I am on my second clomid cycle. Just for the sake of trying to conceive, I've been trying to check my blood pressure at least a couple times a week, and it's been mostly reading in the 140s/85ish, and I was just wondering if I should try to get on a low dose blood pressure medicine. I know it's not super high, but I just worry in case we were to conceive sometime soon. I know it's a different situation in pregnancy. Thank you in advance!    Yumiko Cooley   [birth year]

## 2020-10-16 ENCOUNTER — TRANSCRIBE ORDERS (OUTPATIENT)
Dept: LAB | Facility: HOSPITAL | Age: 25
End: 2020-10-16

## 2020-10-16 ENCOUNTER — LAB (OUTPATIENT)
Dept: LAB | Facility: HOSPITAL | Age: 25
End: 2020-10-16

## 2020-10-16 DIAGNOSIS — E28.2 POLYCYSTIC OVARIES: Primary | ICD-10-CM

## 2020-10-16 DIAGNOSIS — Z11.4 SCREENING FOR HUMAN IMMUNODEFICIENCY VIRUS: ICD-10-CM

## 2020-10-16 DIAGNOSIS — Z11.3 SCREENING EXAMINATION FOR VENEREAL DISEASE: ICD-10-CM

## 2020-10-16 DIAGNOSIS — E28.2 POLYCYSTIC OVARIES: ICD-10-CM

## 2020-10-16 LAB
ABO GROUP BLD: NORMAL
BASOPHILS # BLD AUTO: 0.05 10*3/MM3 (ref 0–0.2)
BASOPHILS NFR BLD AUTO: 0.6 % (ref 0–1.5)
DEPRECATED RDW RBC AUTO: 36.8 FL (ref 37–54)
EOSINOPHIL # BLD AUTO: 0.06 10*3/MM3 (ref 0–0.4)
EOSINOPHIL NFR BLD AUTO: 0.8 % (ref 0.3–6.2)
ERYTHROCYTE [DISTWIDTH] IN BLOOD BY AUTOMATED COUNT: 13.1 % (ref 12.3–15.4)
HBA1C MFR BLD: 5.2 % (ref 4.8–5.6)
HCT VFR BLD AUTO: 38.3 % (ref 34–46.6)
HGB BLD-MCNC: 12.8 G/DL (ref 12–15.9)
IMM GRANULOCYTES # BLD AUTO: 0.02 10*3/MM3 (ref 0–0.05)
IMM GRANULOCYTES NFR BLD AUTO: 0.3 % (ref 0–0.5)
LYMPHOCYTES # BLD AUTO: 2.23 10*3/MM3 (ref 0.7–3.1)
LYMPHOCYTES NFR BLD AUTO: 28.7 % (ref 19.6–45.3)
MCH RBC QN AUTO: 26.2 PG (ref 26.6–33)
MCHC RBC AUTO-ENTMCNC: 33.4 G/DL (ref 31.5–35.7)
MCV RBC AUTO: 78.3 FL (ref 79–97)
MONOCYTES # BLD AUTO: 0.46 10*3/MM3 (ref 0.1–0.9)
MONOCYTES NFR BLD AUTO: 5.9 % (ref 5–12)
NEUTROPHILS NFR BLD AUTO: 4.94 10*3/MM3 (ref 1.7–7)
NEUTROPHILS NFR BLD AUTO: 63.7 % (ref 42.7–76)
NRBC BLD AUTO-RTO: 0 /100 WBC (ref 0–0.2)
PLATELET # BLD AUTO: 278 10*3/MM3 (ref 140–450)
PMV BLD AUTO: 12 FL (ref 6–12)
RBC # BLD AUTO: 4.89 10*6/MM3 (ref 3.77–5.28)
RH BLD: NEGATIVE
RUBV IGG SERPL IA-ACNC: NORMAL
VZV IGG SER IA-ACNC: NEGATIVE
WBC # BLD AUTO: 7.76 10*3/MM3 (ref 3.4–10.8)

## 2020-10-16 PROCEDURE — G0432 EIA HIV-1/HIV-2 SCREEN: HCPCS

## 2020-10-16 PROCEDURE — 82627 DEHYDROEPIANDROSTERONE: CPT

## 2020-10-16 PROCEDURE — 82397 CHEMILUMINESCENT ASSAY: CPT

## 2020-10-16 PROCEDURE — 87340 HEPATITIS B SURFACE AG IA: CPT

## 2020-10-16 PROCEDURE — 86901 BLOOD TYPING SEROLOGIC RH(D): CPT

## 2020-10-16 PROCEDURE — 86780 TREPONEMA PALLIDUM: CPT

## 2020-10-16 PROCEDURE — 86900 BLOOD TYPING SEROLOGIC ABO: CPT

## 2020-10-16 PROCEDURE — 82947 ASSAY GLUCOSE BLOOD QUANT: CPT

## 2020-10-16 PROCEDURE — 83498 ASY HYDROXYPROGESTERONE 17-D: CPT

## 2020-10-16 PROCEDURE — 84403 ASSAY OF TOTAL TESTOSTERONE: CPT

## 2020-10-16 PROCEDURE — 84146 ASSAY OF PROLACTIN: CPT

## 2020-10-16 PROCEDURE — 85025 COMPLETE CBC W/AUTO DIFF WBC: CPT | Performed by: OBSTETRICS & GYNECOLOGY

## 2020-10-16 PROCEDURE — 86762 RUBELLA ANTIBODY: CPT

## 2020-10-16 PROCEDURE — 84443 ASSAY THYROID STIM HORMONE: CPT | Performed by: OBSTETRICS & GYNECOLOGY

## 2020-10-16 PROCEDURE — 86787 VARICELLA-ZOSTER ANTIBODY: CPT

## 2020-10-16 PROCEDURE — 36415 COLL VENOUS BLD VENIPUNCTURE: CPT

## 2020-10-16 PROCEDURE — 83036 HEMOGLOBIN GLYCOSYLATED A1C: CPT | Performed by: OBSTETRICS & GYNECOLOGY

## 2020-10-16 PROCEDURE — 83525 ASSAY OF INSULIN: CPT

## 2020-10-16 PROCEDURE — 86803 HEPATITIS C AB TEST: CPT

## 2020-10-17 LAB
DHEA-S SERPL-MCNC: 238 UG/DL (ref 110–431.7)
GLUCOSE SERPL-MCNC: 101 MG/DL (ref 65–99)
HBV SURFACE AG SERPL QL IA: NORMAL
HCV AB SER DONR QL: NORMAL
HIV1+2 AB SER QL: NORMAL
PROLACTIN SERPL-MCNC: 52.4 NG/ML (ref 4.79–23.3)
TSH SERPL DL<=0.05 MIU/L-ACNC: 2.97 UIU/ML (ref 0.27–4.2)

## 2020-10-19 LAB — T PALLIDUM AB SER QL IF: NON REACTIVE

## 2020-10-21 LAB — INSULIN SERPL-ACNC: 16 UIU/ML

## 2020-10-22 LAB
MIS SERPL-MCNC: 2.78 NG/ML
TESTOST SERPL-MCNC: 29.9 NG/DL (ref 10–55)

## 2020-10-23 LAB — 17OHP SERPL-MCNC: 63 NG/DL

## 2020-10-29 ENCOUNTER — TRANSCRIBE ORDERS (OUTPATIENT)
Dept: LAB | Facility: HOSPITAL | Age: 25
End: 2020-10-29

## 2020-10-29 ENCOUNTER — LAB (OUTPATIENT)
Dept: LAB | Facility: HOSPITAL | Age: 25
End: 2020-10-29

## 2020-10-29 DIAGNOSIS — E28.2 POLYCYSTIC OVARIES: Primary | ICD-10-CM

## 2020-10-29 DIAGNOSIS — E28.2 POLYCYSTIC OVARIES: ICD-10-CM

## 2020-10-29 LAB
GLUCOSE P FAST SERPL-MCNC: 98 MG/DL (ref 74–106)
PROLACTIN SERPL-MCNC: 27.9 NG/ML (ref 4.79–23.3)

## 2020-10-29 PROCEDURE — 84146 ASSAY OF PROLACTIN: CPT

## 2020-10-29 PROCEDURE — 82947 ASSAY GLUCOSE BLOOD QUANT: CPT

## 2020-10-29 PROCEDURE — 36415 COLL VENOUS BLD VENIPUNCTURE: CPT

## 2020-11-04 ENCOUNTER — OFFICE VISIT (OUTPATIENT)
Dept: FAMILY MEDICINE CLINIC | Facility: CLINIC | Age: 25
End: 2020-11-04

## 2020-11-04 VITALS
OXYGEN SATURATION: 99 % | HEART RATE: 88 BPM | DIASTOLIC BLOOD PRESSURE: 80 MMHG | TEMPERATURE: 97.1 F | WEIGHT: 199 LBS | BODY MASS INDEX: 35.26 KG/M2 | HEIGHT: 63 IN | SYSTOLIC BLOOD PRESSURE: 118 MMHG

## 2020-11-04 DIAGNOSIS — R79.89 ELEVATED PROLACTIN LEVEL: Primary | ICD-10-CM

## 2020-11-04 DIAGNOSIS — F41.9 ANXIETY: ICD-10-CM

## 2020-11-04 PROCEDURE — 99213 OFFICE O/P EST LOW 20 MIN: CPT | Performed by: STUDENT IN AN ORGANIZED HEALTH CARE EDUCATION/TRAINING PROGRAM

## 2020-11-04 RX ORDER — LORAZEPAM 0.5 MG/1
TABLET ORAL
Qty: 1 TABLET | Refills: 0 | Status: SHIPPED | OUTPATIENT
Start: 2020-11-04 | End: 2020-12-08

## 2020-11-04 NOTE — PROGRESS NOTES
I have reviewed the patient.  I have reviewed the notes, assessments, and/or procedures performed by Dr Edilia Ch, I concur with her  documentation and assessment and plan for Yumiko Cooley.    Patient will get MMR at pharmacy of her choice.        This document has been electronically signed by Av Holman MD on November 4, 2020 10:38 CST

## 2020-11-04 NOTE — PROGRESS NOTES
ID: Yumiko Cooley    CC:   Chief Complaint   Patient presents with   • Immunizations     she said that she needs the MMR        Subjective:     HPI     Yumiko Cooley is a 25 y.o. female who presents for MMR vaccine because she is equivocal based on lab results. Patient is trying to get pregnant. She had a prolactin test done that was elevated. She is getting an mri next week and is request diazepam for that. Her obgyn thinks that she may have a prolactinoma because she is having severe headaches. Headaches started around two months ago. They come around every day and starts like her sinuses are full and then the pain goes to the back of her head. Nothing makes it better. Has tried ibuprofen without relief. Worsens at night when she lays on left side. She does not want a flu shot. No vision changes. No morning headaches,mainly at night.     Preventative:  Over the past 2 weeks, have you felt down, depressed, or hopeless? no  Over the past 2 weeks, have you felt little interest or pleasure in doing things? no  Clinical depression screening refused by patient no    On osteoporosis therapy? no    Past Medical Hx:  Past Medical History:   Diagnosis Date   • Anxiety    • SVT (supraventricular tachycardia) (CMS/HCC)     s/p ablation November 2015   • Varicella        Past Surgical Hx:  Past Surgical History:   Procedure Laterality Date   • ANKLE SURGERY  12/01/2010   • APPENDECTOMY  11/2012   • CARDIAC ABLATION  11/2015    for SVT       Health Maintenance:  Health Maintenance   Topic Date Due   • ANNUAL PHYSICAL  04/05/1999   • CHLAMYDIA SCREENING  09/27/2017   • INFLUENZA VACCINE  08/01/2020   • PAP SMEAR  09/27/2021   • TDAP/TD VACCINES (2 - Td) 08/07/2024   • HEPATITIS C SCREENING  Completed   • HPV VACCINES  Completed   • Pneumococcal Vaccine 0-64  Aged Out       Current Meds:    Current Outpatient Medications:   •  clomiPHENE (CLOMID) 50 MG tablet, Take 1 tablet by mouth Daily. On CD 5-9, Disp:  5 tablet, Rfl: 0  •  famotidine (Pepcid) 20 MG tablet, Take 1 tablet by mouth 2 (Two) Times a Day., Disp: 60 tablet, Rfl: 6  •  Prenatal Multivit-Min-Fe-FA (PRE- PO), Take  by mouth., Disp: , Rfl:   •  LORazepam (Ativan) 0.5 MG tablet, Take one tablet thirty minutes before MRI, Disp: 1 tablet, Rfl: 0    Allergies:  Tamiflu [oseltamivir phosphate] and Vancomycin    Family Hx:  Family History   Problem Relation Age of Onset   • Depression Mother    • Hypertension Father    • No Known Problems Brother    • No Known Problems Sister    • Uterine cancer Paternal Grandmother    • Colon cancer Maternal Grandmother    • Colon cancer Maternal Grandfather         Social History:  Social History     Socioeconomic History   • Marital status:      Spouse name: Not on file   • Number of children: Not on file   • Years of education: Not on file   • Highest education level: Not on file   Tobacco Use   • Smoking status: Never Smoker   • Smokeless tobacco: Never Used   Substance and Sexual Activity   • Alcohol use: No   • Drug use: No   • Sexual activity: Yes     Partners: Male     Birth control/protection: None       Review of Systems   Constitutional: Negative for activity change, appetite change, chills, fatigue and fever.   HENT: Negative for drooling, ear discharge, ear pain, facial swelling, hearing loss, mouth sores, rhinorrhea and sinus pain.    Eyes: Negative for pain, redness and itching.   Respiratory: Negative for cough, choking, chest tightness, shortness of breath and stridor.    Cardiovascular: Negative for chest pain, palpitations and leg swelling.   Gastrointestinal: Negative for abdominal distention, abdominal pain, anal bleeding, blood in stool, constipation, diarrhea and nausea.   Endocrine: Negative for heat intolerance, polydipsia and polyphagia.   Genitourinary: Negative for dysuria, flank pain, frequency and genital sores.   Musculoskeletal: Negative for back pain, gait problem, joint swelling and  "myalgias.   Skin: Negative for pallor and rash.   Neurological: Negative for seizures, facial asymmetry, speech difficulty, light-headedness, numbness and headaches.   Hematological: Negative for adenopathy. Does not bruise/bleed easily.   Psychiatric/Behavioral: Negative for confusion, decreased concentration, dysphoric mood and hallucinations. The patient is not nervous/anxious and is not hyperactive.            Objective:     /80   Pulse 88   Temp 97.1 °F (36.2 °C)   Ht 160 cm (63\")   Wt 90.3 kg (199 lb)   SpO2 99%   BMI 35.25 kg/m²     Physical Exam  Constitutional:       Appearance: She is well-developed.   HENT:      Head: Normocephalic and atraumatic.      Right Ear: External ear normal.      Left Ear: External ear normal.      Nose: Nose normal.   Eyes:      Conjunctiva/sclera: Conjunctivae normal.      Pupils: Pupils are equal, round, and reactive to light.   Neck:      Musculoskeletal: Normal range of motion and neck supple.   Cardiovascular:      Rate and Rhythm: Normal rate and regular rhythm.      Heart sounds: Normal heart sounds.   Pulmonary:      Effort: Pulmonary effort is normal.      Breath sounds: Normal breath sounds.   Abdominal:      General: Bowel sounds are normal.      Palpations: Abdomen is soft.   Musculoskeletal: Normal range of motion.   Skin:     General: Skin is warm and dry.   Neurological:      Mental Status: She is alert and oriented to person, place, and time.   Psychiatric:         Behavior: Behavior normal.         Thought Content: Thought content normal.         Judgment: Judgment normal.                Assessment/Plan:     Diagnoses and all orders for this visit:    1. Elevated prolactin level (Primary)    2. Anxiety  -     LORazepam (Ativan) 0.5 MG tablet; Take one tablet thirty minutes before MRI  Dispense: 1 tablet; Refill: 0    Patient does have a elevated prolactin and is scheduled to have a mri. Will give her ativan to take thirty minutes before as patient is " very anxious. obgyn thinks this could possibly be due to a tumor. Will wait on results and follow up in one month. No MMR vaccine available here advised patient to go to nearest [facility] for that.       Follow-up:     1 month      Goals:   Goals     • Reduce fat intake/eat healthier (pt-stated)      Barriers:  Likes [employer], but will try to eat it less frequently, e.g. Once every 2 weeks instead of weekly          Barriers to goals: none    Health Maintenance   Topic Date Due   • ANNUAL PHYSICAL  04/05/1999   • CHLAMYDIA SCREENING  09/27/2017   • INFLUENZA VACCINE  08/01/2020   • PAP SMEAR  09/27/2021   • TDAP/TD VACCINES (2 - Td) 08/07/2024   • HEPATITIS C SCREENING  Completed   • HPV VACCINES  Completed   • Pneumococcal Vaccine 0-64  Aged Out       Tobacco: nonsmoker  Alcohol: does not drink  Lifestyle: Patient's Body mass index is 35.25 kg/m². BMI is above normal parameters. Recommendations include: exercise counseling and nutrition counseling.   increase physical activity, cut out extra servings, family to eat at dinner table more often, eat breakfast and have 3 meals a day    RISK SCORE: 3         Edilia Ch M.D. PGY3  [facility]  [address] [place] [address]  Office: [phone]    This document has been electronically signed by Edilia Ch MD on November 4, 2020 09:47 CST            This document has been electronically signed by Edilia Ch MD on November 4, 2020 09:42 CST

## 2020-11-12 ENCOUNTER — TRANSCRIBE ORDERS (OUTPATIENT)
Dept: LAB | Facility: HOSPITAL | Age: 25
End: 2020-11-12

## 2020-11-12 ENCOUNTER — LAB (OUTPATIENT)
Dept: LAB | Facility: HOSPITAL | Age: 25
End: 2020-11-12

## 2020-11-12 DIAGNOSIS — N94.6 DYSMENORRHEA: Primary | ICD-10-CM

## 2020-11-12 DIAGNOSIS — N94.6 DYSMENORRHEA: ICD-10-CM

## 2020-11-12 LAB — PROGEST SERPL-MCNC: 15.3 NG/ML

## 2020-11-12 PROCEDURE — 84144 ASSAY OF PROGESTERONE: CPT

## 2020-11-12 PROCEDURE — 36415 COLL VENOUS BLD VENIPUNCTURE: CPT

## 2020-11-22 DIAGNOSIS — Z34.90 PREGNANCY, UNSPECIFIED GESTATIONAL AGE: Primary | ICD-10-CM

## 2020-12-08 ENCOUNTER — INITIAL PRENATAL (OUTPATIENT)
Dept: OBSTETRICS AND GYNECOLOGY | Facility: CLINIC | Age: 25
End: 2020-12-08

## 2020-12-08 ENCOUNTER — LAB (OUTPATIENT)
Dept: LAB | Facility: HOSPITAL | Age: 25
End: 2020-12-08

## 2020-12-08 VITALS — DIASTOLIC BLOOD PRESSURE: 68 MMHG | WEIGHT: 199.6 LBS | SYSTOLIC BLOOD PRESSURE: 118 MMHG | BODY MASS INDEX: 35.36 KG/M2

## 2020-12-08 VITALS — DIASTOLIC BLOOD PRESSURE: 68 MMHG | SYSTOLIC BLOOD PRESSURE: 118 MMHG | BODY MASS INDEX: 35.36 KG/M2 | WEIGHT: 199.6 LBS

## 2020-12-08 DIAGNOSIS — Z34.90 INTRAUTERINE PREGNANCY: ICD-10-CM

## 2020-12-08 DIAGNOSIS — O09.01 ENCOUNTER FOR SUPERVISION OF HIGH-RISK PREGNANCY WITH HISTORY OF INFERTILITY IN FIRST TRIMESTER: ICD-10-CM

## 2020-12-08 DIAGNOSIS — O26.891 RH NEGATIVE STATUS DURING PREGNANCY IN FIRST TRIMESTER: ICD-10-CM

## 2020-12-08 DIAGNOSIS — Z34.00 SUPERVISION OF NORMAL FIRST PREGNANCY, ANTEPARTUM: Primary | ICD-10-CM

## 2020-12-08 DIAGNOSIS — E66.9 OBESITY (BMI 30-39.9): ICD-10-CM

## 2020-12-08 DIAGNOSIS — Z3A.01 7 WEEKS GESTATION OF PREGNANCY: Primary | ICD-10-CM

## 2020-12-08 DIAGNOSIS — Z67.91 RH NEGATIVE STATUS DURING PREGNANCY IN FIRST TRIMESTER: ICD-10-CM

## 2020-12-08 PROBLEM — O26.899 RH NEGATIVE STATUS DURING PREGNANCY: Status: ACTIVE | Noted: 2020-12-08

## 2020-12-08 PROBLEM — O09.00 SUPERVISION OF HIGH-RISK PREGNANCY WITH HISTORY OF INFERTILITY: Status: ACTIVE | Noted: 2020-12-08

## 2020-12-08 PROBLEM — O09.819 PREGNANCY CONCEIVED THROUGH IN VITRO FERTILIZATION: Status: ACTIVE | Noted: 2020-12-08

## 2020-12-08 LAB
ABO GROUP BLD: NORMAL
AMPHET+METHAMPHET UR QL: NEGATIVE
AMPHETAMINES UR QL: NEGATIVE
BARBITURATES UR QL SCN: NEGATIVE
BASOPHILS # BLD AUTO: 0.05 10*3/MM3 (ref 0–0.2)
BASOPHILS NFR BLD AUTO: 0.5 % (ref 0–1.5)
BENZODIAZ UR QL SCN: NEGATIVE
BILIRUB UR QL STRIP: ABNORMAL
BLD GP AB SCN SERPL QL: NEGATIVE
BUPRENORPHINE SERPL-MCNC: NEGATIVE NG/ML
CANNABINOIDS SERPL QL: NEGATIVE
CLARITY UR: ABNORMAL
COCAINE UR QL: NEGATIVE
COLOR UR: ABNORMAL
DEPRECATED RDW RBC AUTO: 36.1 FL (ref 37–54)
EOSINOPHIL # BLD AUTO: 0.07 10*3/MM3 (ref 0–0.4)
EOSINOPHIL NFR BLD AUTO: 0.7 % (ref 0.3–6.2)
ERYTHROCYTE [DISTWIDTH] IN BLOOD BY AUTOMATED COUNT: 13.2 % (ref 12.3–15.4)
GLUCOSE UR STRIP-MCNC: NEGATIVE MG/DL
HBV SURFACE AG SERPL QL IA: NORMAL
HCT VFR BLD AUTO: 37.4 % (ref 34–46.6)
HCV AB SER DONR QL: NORMAL
HGB BLD-MCNC: 12.6 G/DL (ref 12–15.9)
HGB UR QL STRIP.AUTO: NEGATIVE
HIV1+2 AB SER QL: NORMAL
IMM GRANULOCYTES # BLD AUTO: 0.06 10*3/MM3 (ref 0–0.05)
IMM GRANULOCYTES NFR BLD AUTO: 0.6 % (ref 0–0.5)
KETONES UR QL STRIP: ABNORMAL
LEUKOCYTE ESTERASE UR QL STRIP.AUTO: NEGATIVE
LYMPHOCYTES # BLD AUTO: 2.19 10*3/MM3 (ref 0.7–3.1)
LYMPHOCYTES NFR BLD AUTO: 21.5 % (ref 19.6–45.3)
Lab: NORMAL
MCH RBC QN AUTO: 26 PG (ref 26.6–33)
MCHC RBC AUTO-ENTMCNC: 33.7 G/DL (ref 31.5–35.7)
MCV RBC AUTO: 77.3 FL (ref 79–97)
METHADONE UR QL SCN: NEGATIVE
MONOCYTES # BLD AUTO: 0.58 10*3/MM3 (ref 0.1–0.9)
MONOCYTES NFR BLD AUTO: 5.7 % (ref 5–12)
NEUTROPHILS NFR BLD AUTO: 7.22 10*3/MM3 (ref 1.7–7)
NEUTROPHILS NFR BLD AUTO: 71 % (ref 42.7–76)
NITRITE UR QL STRIP: NEGATIVE
NRBC BLD AUTO-RTO: 0 /100 WBC (ref 0–0.2)
OPIATES UR QL: NEGATIVE
OXYCODONE UR QL SCN: NEGATIVE
PCP UR QL SCN: NEGATIVE
PH UR STRIP.AUTO: <=5 [PH] (ref 5–8)
PLATELET # BLD AUTO: 329 10*3/MM3 (ref 140–450)
PMV BLD AUTO: 12 FL (ref 6–12)
PROPOXYPH UR QL: NEGATIVE
PROT UR QL STRIP: ABNORMAL
RBC # BLD AUTO: 4.84 10*6/MM3 (ref 3.77–5.28)
RH BLD: NEGATIVE
RPR SER QL: NORMAL
RUBV IGG SERPL IA-ACNC: POSITIVE
SP GR UR STRIP: 1.03 (ref 1–1.03)
TRICYCLICS UR QL SCN: NEGATIVE
UROBILINOGEN UR QL STRIP: ABNORMAL
WBC # BLD AUTO: 10.17 10*3/MM3 (ref 3.4–10.8)

## 2020-12-08 PROCEDURE — 87591 N.GONORRHOEAE DNA AMP PROB: CPT | Performed by: FAMILY MEDICINE

## 2020-12-08 PROCEDURE — 86803 HEPATITIS C AB TEST: CPT | Performed by: FAMILY MEDICINE

## 2020-12-08 PROCEDURE — 36415 COLL VENOUS BLD VENIPUNCTURE: CPT

## 2020-12-08 PROCEDURE — 87086 URINE CULTURE/COLONY COUNT: CPT | Performed by: FAMILY MEDICINE

## 2020-12-08 PROCEDURE — 87491 CHLMYD TRACH DNA AMP PROBE: CPT | Performed by: FAMILY MEDICINE

## 2020-12-08 PROCEDURE — 99214 OFFICE O/P EST MOD 30 MIN: CPT | Performed by: FAMILY MEDICINE

## 2020-12-08 PROCEDURE — 81003 URINALYSIS AUTO W/O SCOPE: CPT | Performed by: FAMILY MEDICINE

## 2020-12-08 PROCEDURE — 80306 DRUG TEST PRSMV INSTRMNT: CPT | Performed by: FAMILY MEDICINE

## 2020-12-08 PROCEDURE — 87661 TRICHOMONAS VAGINALIS AMPLIF: CPT | Performed by: FAMILY MEDICINE

## 2020-12-08 PROCEDURE — 80081 OBSTETRIC PANEL INC HIV TSTG: CPT | Performed by: FAMILY MEDICINE

## 2020-12-08 NOTE — PROGRESS NOTES
I spent approximately 45 minutes with the patient acquiring the health and history intake and discussing topics related to healthy lifestyle. She is accompanied by her . She went to [facility] to help with infertility. She has been trying to get pregnant for 3 years.  Her LMP is 10/18/20.  This is her 1st pregnancy. A newob bag is given. The 1st trimester teaching was done with the patient. We discussed a healthy diet and exercise and what is recommended. I also discussed Listeriosis and Toxoplasmosis and what fish to avoid due to high mercury levels. I informed patient not to be in hot tubs, saunas, or tanning beds. We discussed that spotting may occur after intercourse which is common, but if heavy bleeding like a period occurs to call [facility] or hospital if clinic is closed.  I encouraged her to make an appointment with the dentist if she has not had a dental exam and cleaning in the last 6 months. The patient denies use of alcohol, illicit drug use, and tobacco smoking. She plans to breastfeed.  I gave her pamphlet on breastfeeding classes and the breastfeeding mothers support group. These services are provided by Richelle Aguillon, Lactation Consultant. She filled out the health department referral form and depression screening questionnaire. She has a history of anxiety. We discussed the resources that are offered at the health department.  I told the patient that group prenatal education classes are offered here. I instructed patient to let the provider know if she would like to join a group after EDC is established. A Centering Pregnancy pamphlet is given. She is interested in Centering and breastfeeding classes.  I encouraged the patient to get the TDAP vaccine in the 3rd trimester.  I discussed with the patient that a pediatrician needs to be chosen prior to delivery for the infant to have an appointment scheduled before leaving the hospital.  I discussed lab tests will be done today. She will do  an early 1hr glucola at her next appointment.  All questions were answered at this time. She has an appointment today with Dr. Soriano.

## 2020-12-08 NOTE — PROGRESS NOTES
[facility]  Obstetrics Visit    CHIEF COMPLAINT:  New prenatal visit    HISTORY OF PRESENT ILLNESS:  Yumiko Cooley is a 25 y.o. y/o  at 7w4d by definite LMP (Patient's last menstrual period was 10/16/2020 (exact date).). This was a unplanned pregnancy and the patient is supported by FOB.  Patient has PCOS and fertility issues.  She was being seen by [facility] and preparing for IUI in January.  Reports she then had + pregnancy test after a cycle of Femara.  Reports mild nausea without associated vomiting. Reports breast tenderness.  She had 1 episode of spotting on  but she denies any further vaginal bleeding. She has started taking a prenatal vitamin.  Plans to Breastfeed.  She is interested in OB Centering & Breastfeeding classes.    PRENATAL RISK FACTORS   Problems (from 20 to present)     Problem Noted Resolved    Supervision of high-risk pregnancy with history of infertility 2020 by Esperanza Soriano MD No    Rh negative status during pregnancy 2020 by Esperanza Soriano MD No          DATING CRITERIA:  Patient's last menstrual period was 10/16/2020 (exact date). -- Estimated Date of Delivery: 21    OBSTETRIC HISTORY:  OB History    Para Term  AB Living   1 0 0 0 0 0   SAB TAB Ectopic Molar Multiple Live Births   0 0 0 0 0 0      # Outcome Date GA Lbr Ronen/2nd Weight Sex Delivery Anes PTL Lv   1 Current              GYN HISTORY:  Denies h/o sexually transmitted infections/pelvic inflammatory disease  Denies h/o abnormal pap smears, last pap was   Last Completed Pap Smear       Status Date      PAP SMEAR Done 2018 LIQUID-BASED PAP SMEAR, SCREENING        Denies h/o gynecologic surgeries, including biopsies of the cervix    PAST MEDICAL HISTORY:  Past Medical History:   Diagnosis Date   • Anxiety    • Personal history of cardiac arrhythmia    • SVT (supraventricular tachycardia) (CMS/HCC)     s/p ablation 2015      PAST SURGICAL HISTORY:  Past Surgical History:   Procedure Laterality Date   • ANKLE SURGERY  2010   • APPENDECTOMY  2012   • CARDIAC ABLATION  2015    for SVT     FAMILY HISTORY:  Family History   Problem Relation Age of Onset   • Depression Mother    • Hypertension Father    • No Known Problems Brother    • No Known Problems Sister    • Uterine cancer Paternal Grandmother    • No Known Problems Maternal Grandmother    • Colon cancer Maternal Grandfather      SOCIAL HISTORY:  Social History     Socioeconomic History   • Marital status:      Spouse name: Not on file   • Number of children: Not on file   • Years of education: Not on file   • Highest education level: Not on file   Tobacco Use   • Smoking status: Never Smoker   • Smokeless tobacco: Never Used   Substance and Sexual Activity   • Alcohol use: No   • Drug use: No   • Sexual activity: Yes     Partners: Male     Comment: last pap smear 18 negative      GENETIC SCREENING:  Age >34 yo as of LUKASZ: no  Thalassemia: no  NTD: no  CHD: no  Down Syndrome/MR/Fragile X/Autism: no  Ashkenazi Orthodox with Ward-Sachs, [place], familial dysautonomia: no  Sickle cell disease or trait: no  Hemophilia: no  Muscular dystrophy: no  Cystic fibrosis: no  Kam's chorea: no  Birth defects: no  Genetic/chromosomal disorders: no    INFECTION HISTORY:  TB exposure: no  HSV: no  Illness since LMP: no  Prior GBS infected child: n/a  STIs: no    ALLERGIES:  Allergies   Allergen Reactions   • Tamiflu [Oseltamivir Phosphate] Rash   • Vancomycin Itching     MEDICATIONS:  Prior to Admission medications    Medication Sig Start Date End Date Taking? Authorizing Provider   famotidine (Pepcid) 20 MG tablet Take 1 tablet by mouth 2 (Two) Times a Day. 20  Yes Mark Barba MD   Prenatal Multivit-Min-Fe-FA (PRE- PO) Take  by mouth.   Yes ProviderChidi MD       REVIEW OF SYSTEMS  GEN: no fever or chills  CVS: no chest pain or  palpitations  RESP: no cough, no shortness of breath  BREAST: no masses or nipple discharge  GI: + nausea, no vomiting, no diarrhea, constipation or abdominal pain  : no dysuria, no vaginal discharge, no vaginal bleeding  CNS: no dizziness or lightheadedness  DERM: no rash, no lesions  MUSC: no myalgias, no gait disturbance  Psych: no depression or anxiety    PHYSICAL EXAM:   /68   Wt 90.5 kg (199 lb 9.6 oz)   LMP 10/16/2020 (Exact Date)   BMI 35.36 kg/m²   General: Alert, healthy, no distress, well nourished and well developed.  Neurologic: Alert, oriented to person, place, and time.  Gait normal.  Cranial nerves II-XII grossly intact.  HEENT: Normocephalic, atraumatic.  Extraocular muscles intact, pupils equal and reactive x2.    Teeth: Normal hygiene.  Neck: Supple, trachea midline.  Lungs: Clear to auscultation bilaterally, normal effort. No wheezes/rhonchi/rales.  Heart: Regular rate and rhythm.  No murmer, rub or gallop.  Abdomen: Soft, non-tender, non-distended  Skin: No rash, no lesions.  Extremities: No cyanosis, clubbing or edema.  PELVIC EXAM: deferred    Bedside ultrasound performed by myself which shows the findings below:  IUP with fetal pole identified    IMPRESSION:  Yumiko Cooley is a 25 y.o.  at 7w4d for a new prenatal visit.    PLAN:  1.  IUP at 7w4d  - Prenatal labs ordered including Type and Screen, CBC, Rubella, RPR, Hepatitis B and C and HIV  - Gonorrhea and Chlamydia cultures collected from urine  - Urinalysis and urine culture obtained  - Dating/viability ultrasound ordered  - Pap smear utd  - Continue prenatal vitamins  - Weight gain counseling performed.   - BMI <18.5: Recommend 28-40 lb weight gain   - BMI 18.5-24.9: 25-35 lb   - BMI 25-29.9: 15-25 lb   - BMI >30: 11-20 lb    - Return to clinic in 1 week for return prenatal visit    Signature  Esperanza Soriano MD  [facility]  [address] [place] [address], [place]  [address]  Office: [phone]    This document has been electronically signed by Esperanza Soriano MD on December 8, 2020 12:54 CST

## 2020-12-09 LAB — BACTERIA SPEC AEROBE CULT: ABNORMAL

## 2020-12-10 LAB
C TRACH RRNA SPEC QL NAA+PROBE: NEGATIVE
N GONORRHOEA RRNA SPEC QL NAA+PROBE: NEGATIVE
T VAGINALIS DNA SPEC QL NAA+PROBE: NEGATIVE

## 2020-12-11 ENCOUNTER — HOSPITAL ENCOUNTER (EMERGENCY)
Facility: HOSPITAL | Age: 25
Discharge: HOME | End: 2020-12-12
Attending: EMERGENCY MEDICINE | Admitting: EMERGENCY MEDICINE

## 2020-12-11 DIAGNOSIS — O20.0 THREATENED MISCARRIAGE IN EARLY PREGNANCY: Primary | ICD-10-CM

## 2020-12-11 PROCEDURE — 99283 EMERGENCY DEPT VISIT LOW MDM: CPT

## 2020-12-11 PROCEDURE — 81001 URINALYSIS AUTO W/SCOPE: CPT | Performed by: EMERGENCY MEDICINE

## 2020-12-11 PROCEDURE — 87086 URINE CULTURE/COLONY COUNT: CPT | Performed by: EMERGENCY MEDICINE

## 2020-12-11 RX ORDER — SODIUM CHLORIDE 0.9 % (FLUSH) 0.9 %
10 SYRINGE (ML) INJECTION AS NEEDED
Status: DISCONTINUED | OUTPATIENT
Start: 2020-12-11 | End: 2020-12-12 | Stop reason: HOSPADM

## 2020-12-12 ENCOUNTER — APPOINTMENT (OUTPATIENT)
Dept: ULTRASOUND IMAGING | Facility: HOSPITAL | Age: 25
End: 2020-12-12

## 2020-12-12 VITALS
OXYGEN SATURATION: 99 % | HEART RATE: 77 BPM | RESPIRATION RATE: 18 BRPM | HEIGHT: 63 IN | DIASTOLIC BLOOD PRESSURE: 62 MMHG | SYSTOLIC BLOOD PRESSURE: 120 MMHG | BODY MASS INDEX: 34.55 KG/M2 | TEMPERATURE: 97 F | WEIGHT: 195 LBS

## 2020-12-12 LAB
BACTERIA UR QL AUTO: ABNORMAL /HPF
BASOPHILS # BLD AUTO: 0.06 10*3/MM3 (ref 0–0.2)
BASOPHILS NFR BLD AUTO: 0.4 % (ref 0–1.5)
BILIRUB UR QL STRIP: NEGATIVE
CLARITY UR: CLEAR
COLOR UR: YELLOW
DEPRECATED RDW RBC AUTO: 37.3 FL (ref 37–54)
EOSINOPHIL # BLD AUTO: 0.1 10*3/MM3 (ref 0–0.4)
EOSINOPHIL NFR BLD AUTO: 0.7 % (ref 0.3–6.2)
ERYTHROCYTE [DISTWIDTH] IN BLOOD BY AUTOMATED COUNT: 13.2 % (ref 12.3–15.4)
GLUCOSE UR STRIP-MCNC: NEGATIVE MG/DL
HCG INTACT+B SERPL-ACNC: NORMAL MIU/ML
HCT VFR BLD AUTO: 36.3 % (ref 34–46.6)
HGB BLD-MCNC: 12.1 G/DL (ref 12–15.9)
HGB UR QL STRIP.AUTO: ABNORMAL
HYALINE CASTS UR QL AUTO: ABNORMAL /LPF
IMM GRANULOCYTES # BLD AUTO: 0.05 10*3/MM3 (ref 0–0.05)
IMM GRANULOCYTES NFR BLD AUTO: 0.4 % (ref 0–0.5)
INR PPP: 0.95 (ref 0.8–1.2)
KETONES UR QL STRIP: ABNORMAL
LEUKOCYTE ESTERASE UR QL STRIP.AUTO: NEGATIVE
LYMPHOCYTES # BLD AUTO: 3.61 10*3/MM3 (ref 0.7–3.1)
LYMPHOCYTES NFR BLD AUTO: 25.5 % (ref 19.6–45.3)
MCH RBC QN AUTO: 26.3 PG (ref 26.6–33)
MCHC RBC AUTO-ENTMCNC: 33.3 G/DL (ref 31.5–35.7)
MCV RBC AUTO: 78.9 FL (ref 79–97)
MONOCYTES # BLD AUTO: 0.92 10*3/MM3 (ref 0.1–0.9)
MONOCYTES NFR BLD AUTO: 6.5 % (ref 5–12)
NEUTROPHILS NFR BLD AUTO: 66.5 % (ref 42.7–76)
NEUTROPHILS NFR BLD AUTO: 9.43 10*3/MM3 (ref 1.7–7)
NITRITE UR QL STRIP: NEGATIVE
NRBC BLD AUTO-RTO: 0 /100 WBC (ref 0–0.2)
PH UR STRIP.AUTO: 6.5 [PH] (ref 5–9)
PLATELET # BLD AUTO: 312 10*3/MM3 (ref 140–450)
PMV BLD AUTO: 11.1 FL (ref 6–12)
PROT UR QL STRIP: NEGATIVE
PROTHROMBIN TIME: 13 SECONDS (ref 11.1–15.3)
RBC # BLD AUTO: 4.6 10*6/MM3 (ref 3.77–5.28)
RBC # UR: ABNORMAL /HPF
REF LAB TEST METHOD: ABNORMAL
SP GR UR STRIP: 1.01 (ref 1–1.03)
SQUAMOUS #/AREA URNS HPF: ABNORMAL /HPF
UROBILINOGEN UR QL STRIP: ABNORMAL
WBC # BLD AUTO: 14.17 10*3/MM3 (ref 3.4–10.8)
WBC UR QL AUTO: ABNORMAL /HPF

## 2020-12-12 PROCEDURE — 76817 TRANSVAGINAL US OBSTETRIC: CPT

## 2020-12-12 PROCEDURE — 85610 PROTHROMBIN TIME: CPT | Performed by: EMERGENCY MEDICINE

## 2020-12-12 PROCEDURE — 85025 COMPLETE CBC W/AUTO DIFF WBC: CPT | Performed by: EMERGENCY MEDICINE

## 2020-12-12 PROCEDURE — 84702 CHORIONIC GONADOTROPIN TEST: CPT | Performed by: EMERGENCY MEDICINE

## 2020-12-12 NOTE — ED NOTES
Pt states that she is 8 weeks pregnant and has been having brown spotting for the past few days.      Renee Solitario, RN  12/11/20 0110

## 2020-12-12 NOTE — ED PROVIDER NOTES
Subjective   25-year-old female  approximately 7 weeks gestation presents the emergency department with complaint of vaginal spotting.  She reports some lower abdominal cramping.  Denies any urinary symptoms.  She has had initial appointment with OB/GYN but has not had a menstrual ultrasound.  Denies any vomiting or diarrhea.  Denies any fevers or chills.  She has had recent pelvic cultures that were negative. Reports dark brown spotting on the toilet paper when she wipes, no other bleeding.     Family history, surgical history, social history, current medications and allergies are reviewed with the patient and triage documentation and vitals are reviewed.      History provided by:  Patient and medical records   used: No        Review of Systems   Constitutional: Negative for chills and fever.   HENT: Negative for congestion and sore throat.    Eyes: Negative.    Respiratory: Negative for cough and shortness of breath.    Cardiovascular: Negative for chest pain and palpitations.   Gastrointestinal: Positive for abdominal pain. Negative for constipation, diarrhea, nausea and vomiting.   Endocrine: Negative.    Genitourinary: Positive for vaginal bleeding. Negative for dysuria, frequency, menstrual problem, urgency, vaginal discharge and vaginal pain.   Musculoskeletal: Negative for back pain and neck pain.   Skin: Negative for color change, rash and wound.   Allergic/Immunologic: Negative.    Neurological: Negative.    Hematological: Negative.    Psychiatric/Behavioral: Negative.        Past Medical History:   Diagnosis Date   • Anxiety    • Personal history of cardiac arrhythmia    • SVT (supraventricular tachycardia) (CMS/HCC)     s/p ablation 2015       Allergies   Allergen Reactions   • Tamiflu [Oseltamivir Phosphate] Rash   • Vancomycin Itching       Past Surgical History:   Procedure Laterality Date   • ANKLE SURGERY  2010   • APPENDECTOMY  2012   • CARDIAC ABLATION   11/2015    for SVT       Family History   Problem Relation Age of Onset   • Depression Mother    • Hypertension Father    • No Known Problems Brother    • No Known Problems Sister    • Uterine cancer Paternal Grandmother    • No Known Problems Maternal Grandmother    • Colon cancer Maternal Grandfather        Social History     Socioeconomic History   • Marital status:      Spouse name: Not on file   • Number of children: Not on file   • Years of education: Not on file   • Highest education level: Not on file   Tobacco Use   • Smoking status: Never Smoker   • Smokeless tobacco: Never Used   Substance and Sexual Activity   • Alcohol use: No   • Drug use: No   • Sexual activity: Yes     Partners: Male     Comment: last pap smear 9/27/18 negative            Objective   Physical Exam  Vitals signs and nursing note reviewed.   Constitutional:       General: She is not in acute distress.     Appearance: Normal appearance. She is normal weight. She is not ill-appearing.   HENT:      Head: Normocephalic.   Eyes:      Conjunctiva/sclera: Conjunctivae normal.      Pupils: Pupils are equal, round, and reactive to light.   Neck:      Musculoskeletal: Normal range of motion and neck supple.   Cardiovascular:      Rate and Rhythm: Normal rate and regular rhythm.      Pulses: Normal pulses.      Heart sounds: No murmur.   Pulmonary:      Effort: Pulmonary effort is normal.      Breath sounds: Normal breath sounds.   Abdominal:      General: Bowel sounds are normal.      Palpations: Abdomen is soft.      Tenderness: There is no abdominal tenderness. There is no guarding or rebound.   Musculoskeletal: Normal range of motion.   Skin:     General: Skin is warm and dry.      Capillary Refill: Capillary refill takes less than 2 seconds.   Neurological:      General: No focal deficit present.      Mental Status: She is alert and oriented to person, place, and time.   Psychiatric:         Mood and Affect: Mood normal.          Behavior: Behavior normal.         Procedures  none         ED Course      Labs Reviewed   URINALYSIS W/ CULTURE IF INDICATED - Abnormal; Notable for the following components:       Result Value    Ketones, UA 40 mg/dL (2+) (*)     Blood, UA Trace (*)     All other components within normal limits   CBC WITH AUTO DIFFERENTIAL - Abnormal; Notable for the following components:    WBC 14.17 (*)     MCV 78.9 (*)     MCH 26.3 (*)     Neutrophils, Absolute 9.43 (*)     Lymphocytes, Absolute 3.61 (*)     Monocytes, Absolute 0.92 (*)     All other components within normal limits   URINALYSIS, MICROSCOPIC ONLY - Abnormal; Notable for the following components:    RBC, UA 0-2 (*)     All other components within normal limits   URINE CULTURE - Normal   PROTIME-INR - Normal    Narrative:     Therapeutic range for most indications is 2.0-3.0 INR,  or 2.5-3.5 for mechanical heart valves.   HCG, QUANTITATIVE, PREGNANCY    Narrative:     HCG Ranges by Gestational Age    Females - non-pregnant premenopausal   </= 1mIU/mL HCG  Females - postmenopausal               </= 7mIU/mL HCG    3 Weeks         5.8 -    71.2 mIU/mL  4 Weeks         9.5 -     750 mIU/mL  5 Weeks         217 -   7,138 mIU/mL  6 Weeks         158 -  31,795 mIU/mL  7 Weeks       3,697 - 163,563 mIU/mL  8 Weeks      32,065 - 149,571 mIU/mL  9 Weeks      63,803 - 151,410 mIU/mL  10 Weeks     46,509 - 186,977 mIU/mL  12 Weeks     27,832 - 210,612 mIU/mL  14 Weeks     13,950 -  62,530 mIU/mL  15 Weeks     12,039 -  70,971 mIU/mL  16 Weeks      9,040 -  56,451 mIU/mL  17 Weeks      8,175 -  55,868 mIU/mL  18 Weeks      8,099 -  58,176 mIU/mL  Results may be falsely decreased if patient taking Biotin.     CBC AND DIFFERENTIAL    Narrative:     The following orders were created for panel order CBC & Differential.  Procedure                               Abnormality         Status                     ---------                               -----------         ------                      CBC Auto Differential[[id number]]        Abnormal            Final result                 Please view results for these tests on the individual orders.     Us Ob Transvaginal    Result Date: 12/12/2020  Narrative: EXAM:  US First Trimester Pregnancy, Transabdominal and Transvaginal CLINICAL HISTORY:  25 years old Female; bleeding and pain with pregnancy. TECHNIQUE:  Real-time transabdominal and transvaginal obstetrical ultrasound of the maternal pelvis and a first trimester pregnancy with image documentation.  Transvaginal imaging was used for better evaluation of the fetus and adnexa. COMPARISON:  No relevant prior studies available. FINDINGS: GESTATION:  Gestational sac in the fundal endometrium which contains a fetal pole with positive fetal heart tones at a rate of 139 bpm and a yolk sac. Estimated gestational age is seven weeks one day by crown-rump length measurement. PLACENTA/AMNIOTIC FLUID:  Cannot be adequately evaluated due to the early gestational age. UTERUS/CERVIX:  Uterine measurements not provided, no definite focal uterine abnormality seen. Cervix measures approximately 3.3 cm and the cervical os appears closed. OVARIES:  Right ovary measures 3.6 x 3.1 x 2.6 cm and contains a simple cyst which measures up to 2.1 cm which may represent a corpus luteum cyst. No right adnexal mass seen. Left ovary measures 2.3 x 2.1 x 1.8 cm and reveals no focal abnormality. No left adnexal mass seen. Images demonstrating flow in the ovaries are not provided. FREE FLUID:  Small amount of free fluid in the dependent pelvis.     Impression: - Single live intrauterine pregnancy with an estimated gestational age of seven weeks one day for an estimated due date of 7/30/2021. Estimated gestational age by last menstrual period estimate is eight weeks one day for an estimated due date of 7/23/2021. - Images demonstrate flow in the ovaries are not provided and ovarian torsion cannot be ruled out on the basis of this  examination. - Small cyst in the right ovary which may represent a corpus luteum cyst. Thank you for allowing us to participate in the care of this patient. Electronically signed by:  Joseph Oakley MD  12/12/2020 12:44 AM [facility] Workstation: [id number]          [facility]  Number of Diagnoses or Management Options  Threatened miscarriage in early pregnancy:      Amount and/or Complexity of Data Reviewed  Clinical lab tests: reviewed  Tests in the radiology section of CPT®: reviewed    Patient Progress  Patient progress: stable    Single viable intrauterine pregnancy.  Estimated due date is off from 1 week from her menstrual period date.  Patient is advised to be due date based on first trimester ultrasound.  Bleeding has been minimal.  Patient's blood type is A- but advised on follow-up Monday morning with OB/GYN regarding possibility of needing RhoGam.  Will return here with any new or worsening symptoms.  No evidence of urinary tract infection.  Vaginal cultures a few days ago were negative.  Acknowledge understanding of pelvic rest and close follow-up with OB/GYN.    Final diagnoses:   Threatened miscarriage in early pregnancy            Jesu Infante, DO  12/15/20 [id number]

## 2020-12-12 NOTE — DISCHARGE INSTRUCTIONS
Please return with new or worsening symptoms.  Contact OB/GYN Monday morning to discuss follow-up.

## 2020-12-13 LAB — BACTERIA SPEC AEROBE CULT: NO GROWTH

## 2020-12-14 ENCOUNTER — TELEPHONE (OUTPATIENT)
Dept: FAMILY MEDICINE CLINIC | Facility: CLINIC | Age: 25
End: 2020-12-14

## 2020-12-15 ENCOUNTER — TELEPHONE (OUTPATIENT)
Dept: FAMILY MEDICINE CLINIC | Facility: CLINIC | Age: 25
End: 2020-12-15

## 2020-12-15 NOTE — TELEPHONE ENCOUNTER
TRIED TO CALL PATIENT TO MAKE ER F/U; WAS UNABLE TO REACH PT X2.        THANK YOU,        COURTNEY

## 2020-12-17 ENCOUNTER — ROUTINE PRENATAL (OUTPATIENT)
Dept: OBSTETRICS AND GYNECOLOGY | Facility: CLINIC | Age: 25
End: 2020-12-17

## 2020-12-17 VITALS — WEIGHT: 196 LBS | SYSTOLIC BLOOD PRESSURE: 112 MMHG | DIASTOLIC BLOOD PRESSURE: 66 MMHG | BODY MASS INDEX: 34.72 KG/M2

## 2020-12-17 DIAGNOSIS — O09.01 ENCOUNTER FOR SUPERVISION OF HIGH-RISK PREGNANCY WITH HISTORY OF INFERTILITY IN FIRST TRIMESTER: ICD-10-CM

## 2020-12-17 DIAGNOSIS — O26.891 RH NEGATIVE STATUS DURING PREGNANCY IN FIRST TRIMESTER: ICD-10-CM

## 2020-12-17 DIAGNOSIS — Z3A.08 8 WEEKS GESTATION OF PREGNANCY: Primary | ICD-10-CM

## 2020-12-17 DIAGNOSIS — Z67.91 RH NEGATIVE STATUS DURING PREGNANCY IN FIRST TRIMESTER: ICD-10-CM

## 2020-12-17 PROCEDURE — 99212 OFFICE O/P EST SF 10 MIN: CPT | Performed by: NURSE PRACTITIONER

## 2020-12-17 NOTE — PROGRESS NOTES
CC: Prenatal visit    Yumiko Cooley is a 25 y.o.  at 8w6d.  Doing well.  No complaints.  Denies contractions, LOF, or VB since ED visit last week.     /66   Wt 88.9 kg (196 lb)   LMP 10/16/2020 (Exact Date)   BMI 34.72 kg/m²              Problems (from 20 to present)     Problem Noted Resolved    Supervision of high-risk pregnancy with history of infertility 2020 by Esperanza Soriano MD No    Overview Addendum 2020  7:39 AM by Esperanza Soriano MD     A NEG/ ABS NEG/ Rubella immune/ GBS @ 36wks  Dating: LMP ? 1TUS  Genetics: ?  Tdap: 28wks  Flu: will offer  Anatomy: 20wks  1h Glucola: 28wks  Lab Results   Component Value Date    HGB 12.8 10/16/2020    HCT 38.3 10/16/2020     10/16/2020     Feeding Method: Breastfeed  BC plan: ?    Conceived after [employer].           Rh negative status during pregnancy 2020 by Esperanza Soriano MD No          A/P: Yumiko Cooley is a 25 y.o.  at 8w6d.  - RTC in 4 weeks for DEEDEE appt or sooner if needed   Discussed vaginal spotting in pregnancy     Diagnosis Plan   1. 8 weeks gestation of pregnancy     2. Encounter for supervision of high-risk pregnancy with history of infertility in first trimester     3. Rh negative status during pregnancy in first trimester         JOHANA Cui  2020  10:08 CST

## 2020-12-21 ENCOUNTER — TELEPHONE (OUTPATIENT)
Dept: OBSTETRICS AND GYNECOLOGY | Facility: CLINIC | Age: 25
End: 2020-12-21

## 2020-12-21 NOTE — TELEPHONE ENCOUNTER
----- Message from Yumiko Cooley sent at 12/19/2020  1:20 PM CST -----  Regarding: Non-Urgent Medical Question  Contact: [phone]  Hi Dr. Soriano,  I have been having some pale yellow discharge. I only really notice it about once a day, but I don't have another appointment for 4 weeks, so I didn't want to take the chance of waiting in case it's an infection. Thank you in advance!    Yumiko Lopez  [birth year]

## 2020-12-21 NOTE — TELEPHONE ENCOUNTER
I called this patient, she has not had any discharge in the past 2 days.  If it comes back she is going to come in to get swabbed.

## 2021-01-13 ENCOUNTER — ROUTINE PRENATAL (OUTPATIENT)
Dept: OBSTETRICS AND GYNECOLOGY | Facility: CLINIC | Age: 26
End: 2021-01-13

## 2021-01-13 VITALS — WEIGHT: 190.8 LBS | DIASTOLIC BLOOD PRESSURE: 68 MMHG | SYSTOLIC BLOOD PRESSURE: 90 MMHG | BODY MASS INDEX: 33.8 KG/M2

## 2021-01-13 DIAGNOSIS — O26.891 RH NEGATIVE STATUS DURING PREGNANCY IN FIRST TRIMESTER: ICD-10-CM

## 2021-01-13 DIAGNOSIS — Z3A.12 12 WEEKS GESTATION OF PREGNANCY: Primary | ICD-10-CM

## 2021-01-13 DIAGNOSIS — Z67.91 RH NEGATIVE STATUS DURING PREGNANCY IN FIRST TRIMESTER: ICD-10-CM

## 2021-01-13 DIAGNOSIS — O09.01 ENCOUNTER FOR SUPERVISION OF HIGH-RISK PREGNANCY WITH HISTORY OF INFERTILITY IN FIRST TRIMESTER: ICD-10-CM

## 2021-01-13 PROBLEM — J11.1 INFLUENZA-LIKE ILLNESS: Status: RESOLVED | Noted: 2020-01-18 | Resolved: 2021-01-13

## 2021-01-13 PROBLEM — Z20.828 EXPOSURE TO INFLUENZA: Status: RESOLVED | Noted: 2020-01-18 | Resolved: 2021-01-13

## 2021-01-13 PROCEDURE — 99212 OFFICE O/P EST SF 10 MIN: CPT | Performed by: FAMILY MEDICINE

## 2021-01-13 NOTE — PROGRESS NOTES
CC: Prenatal visit    Yumiko Cooley is a 25 y.o.  at 12w5d.  Doing well.  No complaints.  Denies contractions, LOF, or VB.  Has not yet felt FM.  Would like to be scheduled for OB Centering.    BP 90/68   Wt 86.5 kg (190 lb 12.8 oz)   LMP 10/16/2020 (Exact Date)   BMI 33.80 kg/m²   SVE: deferred     Fetal Heart Rate: +HHUS     Problems (from 20 to present)     Problem Noted Resolved    Supervision of high-risk pregnancy with history of infertility 2020 by Esperanza Soriano MD No    Overview Addendum 2020  7:39 AM by Esperanza Soriano MD     A NEG/ ABS NEG/ Rubella immune/ GBS @ 36wks  Dating: LMP ? 1TUS  Genetics: ?  Tdap: 28wks  Flu: will offer  Anatomy: 20wks  1h Glucola: 28wks  Lab Results   Component Value Date    HGB 12.8 10/16/2020    HCT 38.3 10/16/2020     10/16/2020     Feeding Method: Breastfeed  BC plan: ?    Conceived after [employer].           Rh negative status during pregnancy 2020 by Esperanza Soriano MD No          A/P: Yumiko Cooley is a 25 y.o.  at 12w5d.  - RTC in 4 week for OB CENTERING (2/10/21 8:30AM)  - cramping precautions given     Diagnosis Plan   1. 12 weeks gestation of pregnancy     2. Encounter for supervision of high-risk pregnancy with history of infertility in first trimester     3. Rh negative status during pregnancy in first trimester         Signature  Esperanza Soriano MD  [facility]  [address] [place] [address], [place] [address]  Office: [phone]      This document has been electronically signed by Esperanza Soriano MD on 2021 09:39 CST

## 2021-01-14 ENCOUNTER — OFFICE VISIT (OUTPATIENT)
Dept: FAMILY MEDICINE CLINIC | Facility: CLINIC | Age: 26
End: 2021-01-14

## 2021-01-14 VITALS
WEIGHT: 188.5 LBS | OXYGEN SATURATION: 99 % | SYSTOLIC BLOOD PRESSURE: 100 MMHG | HEIGHT: 63 IN | HEART RATE: 105 BPM | BODY MASS INDEX: 33.4 KG/M2 | DIASTOLIC BLOOD PRESSURE: 80 MMHG

## 2021-01-14 DIAGNOSIS — M54.2 CHRONIC NECK PAIN: Primary | ICD-10-CM

## 2021-01-14 DIAGNOSIS — G89.29 CHRONIC NECK PAIN: Primary | ICD-10-CM

## 2021-01-14 PROCEDURE — 99213 OFFICE O/P EST LOW 20 MIN: CPT | Performed by: STUDENT IN AN ORGANIZED HEALTH CARE EDUCATION/TRAINING PROGRAM

## 2021-01-14 NOTE — PROGRESS NOTES
I have reviewed the patient.  I have reviewed the notes, assessments, and/or procedures performed by Dr Ivone Gaines, I concur with his  documentation and assessment and plan for Yumiko Cooley.          This document has been electronically signed by Av Holman MD on January 14, 2021 14:10 CST

## 2021-01-14 NOTE — PROGRESS NOTES
Subjective   Yumiko Cooley is a 25 y.o. female who presents for initial evaluation  for chronic midline neck pain with intermitttent extension to the left arm. She states pain has been present for 6 months. 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 She states that pain is relieved somewhat with stretches. She denies nay numbness/weakness, or burning down the arms.       Past Medical Hx:  Past Medical History:   Diagnosis Date   • Anxiety    • Personal history of cardiac arrhythmia    • SVT (supraventricular tachycardia) (CMS/HCC)     s/p ablation 2015       Past Surgical Hx:  Past Surgical History:   Procedure Laterality Date   • ANKLE SURGERY  2010   • APPENDECTOMY  2012   • CARDIAC ABLATION  2015    for SVT       Health Maintenance:  Health Maintenance   Topic Date Due   • ANNUAL PHYSICAL  1999   • INFLUENZA VACCINE  2020   • PAP SMEAR  2021   • CHLAMYDIA SCREENING  2021   • TDAP/TD VACCINES (2 - Td) 2024   • HEPATITIS C SCREENING  Completed   • MENINGOCOCCAL VACCINE  Completed   • HPV VACCINES  Completed   • Pneumococcal Vaccine 0-64  Aged Out       Current Meds:    Current Outpatient Medications:   •  famotidine (Pepcid) 20 MG tablet, Take 1 tablet by mouth 2 (Two) Times a Day., Disp: 60 tablet, Rfl: 6  •  Prenatal Multivit-Min-Fe-FA (PRE-ROSS PO), Take  by mouth., Disp: , Rfl:   •  Diclofenac Sodium (VOLTAREN) 1 % gel gel, Apply 4 g topically to the appropriate area as directed 3 (Three) Times a Day., Disp: 150 g, Rfl: 2    Allergies:  Tamiflu [oseltamivir phosphate] and Vancomycin    Family Hx:  Family History   Problem Relation Age of Onset   • Depression Mother    • Hypertension Father    • No Known Problems Brother    • No Known Problems Sister    • Uterine cancer Paternal Grandmother    • No Known Problems Maternal Grandmother    • Colon cancer Maternal Grandfather  "        Social History:  Social History     Socioeconomic History   • Marital status:      Spouse name: Not on file   • Number of children: Not on file   • Years of education: Not on file   • Highest education level: Not on file   Tobacco Use   • Smoking status: Never Smoker   • Smokeless tobacco: Never Used   Substance and Sexual Activity   • Alcohol use: No   • Drug use: No   • Sexual activity: Yes     Partners: Male     Comment: last pap smear 9/27/18 negative        Review of Systems  Review of Systems   Constitutional: Negative for activity change, chills, diaphoresis and fever.   HENT: Negative for dental problem, drooling, ear discharge, ear pain, facial swelling, mouth sores, nosebleeds, rhinorrhea, sinus pressure, sinus pain, sneezing and sore throat.    Eyes: Negative for pain, discharge and visual disturbance.   Respiratory: Negative for apnea, cough, shortness of breath, wheezing and stridor.    Cardiovascular: Negative for chest pain, palpitations and leg swelling.   Gastrointestinal: Negative for abdominal distention, abdominal pain, constipation, diarrhea, nausea and vomiting.   Genitourinary: Negative for dysuria, frequency and urgency.   Musculoskeletal: Positive for neck pain. Negative for arthralgias and back pain.   Skin: Negative for rash and wound.   Neurological: Negative for dizziness, facial asymmetry, light-headedness and headaches.   Psychiatric/Behavioral: Negative for agitation and decreased concentration. The patient is not nervous/anxious.             Objective:     /80   Pulse 105   Ht 160 cm (63\")   Wt 85.5 kg (188 lb 8 oz)   LMP 10/16/2020 (Exact Date)   SpO2 99%   BMI 33.39 kg/m²       Physical Exam  Constitutional:       Appearance: She is well-developed.   HENT:      Head: Normocephalic and atraumatic.      Right Ear: External ear normal.      Left Ear: External ear normal.      Nose: Nose normal.      Mouth/Throat:      Pharynx: No oropharyngeal exudate.   "   Eyes:      General: No scleral icterus.        Right eye: No discharge.         Left eye: No discharge.      Conjunctiva/sclera: Conjunctivae normal.      Pupils: Pupils are equal, round, and reactive to light.   Neck:      Musculoskeletal: Normal range of motion and neck supple.      Vascular: No JVD.   Cardiovascular:      Rate and Rhythm: Normal rate and regular rhythm.      Heart sounds: Normal heart sounds. No murmur. No friction rub. No gallop.    Pulmonary:      Effort: Pulmonary effort is normal. No respiratory distress.      Breath sounds: Normal breath sounds. No stridor. No wheezing or rales.   Abdominal:      General: Bowel sounds are normal. There is no distension.      Palpations: Abdomen is soft.      Tenderness: There is no abdominal tenderness.   Musculoskeletal: Normal range of motion.         General: Tenderness (midline neck ) present. No deformity.   Skin:     General: Skin is warm and dry.      Capillary Refill: Capillary refill takes less than 2 seconds.      Coloration: Skin is not pale.      Findings: No erythema or rash.   Neurological:      Mental Status: She is alert and oriented to person, place, and time.   Psychiatric:         Behavior: Behavior normal.         Assessment/Plan:     Diagnoses and all orders for this visit:    1. Chronic neck pain (Primary)  -     Diclofenac Sodium (VOLTAREN) 1 % gel gel; Apply 4 g topically to the appropriate area as directed 3 (Three) Times a Day.  Dispense: 150 g; Refill: 2         Follow-up:     No follow-ups on file.    Goals     • Reduce fat intake/eat healthier (pt-stated)      Barriers:  Likes [employer], but will try to eat it less frequently, e.g. Once every 2 weeks instead of weekly            Preventative:    Vaccines Recommended at this visit:   Influenza    Vaccines Received at this visit:  Patient refused all vaccinations at this visit.    Screenings Recommended at this visit:  No Screenings offered today. Patient is up to date on all  screenings at this time.     Screenings Ordered at this visit:  No screenings were offered today. Patient is up to date on all screenings.     Smoking Status:  Patient has never smoked.    Alcohol Intake:  Patient does not drink    Patient's Body mass index is 33.39 kg/m². BMI is above normal parameters. Recommendations include: exercise counseling and nutrition counseling.         RISK SCORE: 3          This document has been electronically signed by Ivone Gaines MD on January 14, 2021 14:03 CST

## 2021-02-10 ENCOUNTER — ROUTINE PRENATAL (OUTPATIENT)
Dept: OBSTETRICS AND GYNECOLOGY | Facility: CLINIC | Age: 26
End: 2021-02-10

## 2021-02-10 VITALS — DIASTOLIC BLOOD PRESSURE: 73 MMHG | WEIGHT: 185 LBS | BODY MASS INDEX: 32.77 KG/M2 | SYSTOLIC BLOOD PRESSURE: 112 MMHG

## 2021-02-10 DIAGNOSIS — M54.50 LOW BACK PAIN DURING PREGNANCY, SECOND TRIMESTER: ICD-10-CM

## 2021-02-10 DIAGNOSIS — Z36.89 ENCOUNTER FOR FETAL ANATOMIC SURVEY: ICD-10-CM

## 2021-02-10 DIAGNOSIS — O26.892 LOW BACK PAIN DURING PREGNANCY, SECOND TRIMESTER: ICD-10-CM

## 2021-02-10 DIAGNOSIS — O09.02 ENCOUNTER FOR SUPERVISION OF HIGH-RISK PREGNANCY WITH HISTORY OF INFERTILITY IN SECOND TRIMESTER: ICD-10-CM

## 2021-02-10 DIAGNOSIS — O26.12 POOR WEIGHT GAIN OF PREGNANCY, SECOND TRIMESTER: ICD-10-CM

## 2021-02-10 DIAGNOSIS — Z3A.16 16 WEEKS GESTATION OF PREGNANCY: Primary | ICD-10-CM

## 2021-02-10 PROCEDURE — 99212 OFFICE O/P EST SF 10 MIN: CPT | Performed by: NURSE PRACTITIONER

## 2021-02-10 NOTE — PROGRESS NOTES
CC: Prenatal visit    Yumiok Cooley is a 25 y.o.  at 16w5d.  Doing well.  Denies contractions, LOF, or VB.  Has not felt baby move this pregnancy yet. Accompanied by spouse, Arnold today for Centering Pregnancy.     /73   Wt 83.9 kg (185 lb)   LMP 10/16/2020 (Exact Date)   BMI 32.77 kg/m²   SVE: Deferred     Fetal Heart Rate: 150     Problems (from 20 to present)     Problem Noted Resolved    Poor weight gain of pregnancy, second trimester 2/10/2021 by Safia Escobedo APRN No    Supervision of high-risk pregnancy with history of infertility 2020 by Esperanza Soriano MD No    Overview Addendum 2020  7:39 AM by Esperanza Soriano MD     A NEG/ ABS NEG/ Rubella immune/ GBS @ 36wks  Dating: LMP ? 1TUS  Genetics: ?  Tdap: 28wks  Flu: will offer  Anatomy: 20wks  1h Glucola: 28wks  Lab Results   Component Value Date    HGB 12.8 10/16/2020    HCT 38.3 10/16/2020     10/16/2020     Feeding Method: Breastfeed  BC plan: ?    Conceived after [facility].           Rh negative status during pregnancy 2020 by Esperanza Soriano MD No          A/P: Yumiko Cooley is a 25 y.o.  at 16w5d.  -Centering session #1: pregnancy discomforts, dental hygiene, and overview of breastfeeding and discussed breast pump rx. [employer] website given. Pt plans to buy Mara pump. Discussed physical therapy with Nanci for worsening back pain, back and pelvic floor exercise handout given, and discussed pregnancy belt. Pt declines treatment or referral at this time. Counseled to make a dental cleaning appointment.    - Will do Anatomy US in 3 weeks  - PTL precautions   - RTC in 4 weeks for Centering Pregnancy  - Reviewed COVID-19 visitation policy  - Reviewed COVID-19 precautions     Diagnosis Plan   1. 16 weeks gestation of pregnancy     2. Encounter for fetal anatomic survey  MFM OB US MAD   3. Poor weight gain of pregnancy, second trimester     4. Low back pain  during pregnancy, second trimester     5. Encounter for supervision of high-risk pregnancy with history of infertility in second trimester       Safia Mcclain, APRN  2/10/2021  09:49 CST

## 2021-02-25 ENCOUNTER — OFFICE VISIT (OUTPATIENT)
Dept: FAMILY MEDICINE CLINIC | Facility: CLINIC | Age: 26
End: 2021-02-25

## 2021-02-25 VITALS
HEIGHT: 63 IN | HEART RATE: 79 BPM | WEIGHT: 186 LBS | BODY MASS INDEX: 32.96 KG/M2 | TEMPERATURE: 97.8 F | DIASTOLIC BLOOD PRESSURE: 74 MMHG | SYSTOLIC BLOOD PRESSURE: 118 MMHG | OXYGEN SATURATION: 99 %

## 2021-02-25 DIAGNOSIS — K21.9 GASTROESOPHAGEAL REFLUX DISEASE, UNSPECIFIED WHETHER ESOPHAGITIS PRESENT: Primary | ICD-10-CM

## 2021-02-25 PROCEDURE — 99213 OFFICE O/P EST LOW 20 MIN: CPT | Performed by: STUDENT IN AN ORGANIZED HEALTH CARE EDUCATION/TRAINING PROGRAM

## 2021-02-25 NOTE — PROGRESS NOTES
Family Medicine Residency  Mark Barba MD    Subjective:     Yumiko Cooley is a 25 y.o. female who presents for GERD follow up. Resolved with Famotidine 20mg BID and now mildly worsened due to pregnancy. Following with Dr. Soriano. Symptoms worse at night and is avoiding acidic foods and late meals.     The following portions of the patient's history were reviewed and updated as appropriate: allergies, current medications, past family history, past medical history, past social history, past surgical history and problem list.    Past Medical Hx:  Past Medical History:   Diagnosis Date   • Anxiety    • Personal history of cardiac arrhythmia    • SVT (supraventricular tachycardia) (CMS/[facility])     s/p ablation 2015       Past Surgical Hx:  Past Surgical History:   Procedure Laterality Date   • ANKLE SURGERY  2010   • APPENDECTOMY  2012   • CARDIAC ABLATION  2015    for SVT       Current Meds:    Current Outpatient Medications:   •  famotidine (Pepcid) 20 MG tablet, Take 1 tablet by mouth 2 (Two) Times a Day., Disp: 60 tablet, Rfl: 6  •  Prenatal Multivit-Min-Fe-FA (PRE-ROSS PO), Take  by mouth., Disp: , Rfl:   •  Diclofenac Sodium (VOLTAREN) 1 % gel gel, Apply 4 g topically to the appropriate area as directed 3 (Three) Times a Day., Disp: 150 g, Rfl: 2    Allergies:  Allergies   Allergen Reactions   • Tamiflu [Oseltamivir Phosphate] Rash   • Vancomycin Itching       Family Hx:  Family History   Problem Relation Age of Onset   • Depression Mother    • Hypertension Father    • No Known Problems Brother    • No Known Problems Sister    • Uterine cancer Paternal Grandmother    • No Known Problems Maternal Grandmother    • Colon cancer Maternal Grandfather         Social History:  Social History     Socioeconomic History   • Marital status:      Spouse name: Not on file   • Number of children: Not on file   • Years of education: Not on file   • Highest education level:  "Not on file   Tobacco Use   • Smoking status: Never Smoker   • Smokeless tobacco: Never Used   Substance and Sexual Activity   • Alcohol use: No   • Drug use: No   • Sexual activity: Yes     Partners: Male     Comment: last pap smear 9/27/18 negative        Review of Systems  Review of Systems   Constitutional: Negative for activity change and appetite change.   HENT: Negative for congestion and trouble swallowing.    Respiratory: Negative for chest tightness and shortness of breath.    Cardiovascular: Negative for chest pain and palpitations.   Gastrointestinal: Positive for abdominal pain (Heartburn). Negative for abdominal distention.   Genitourinary: Negative for difficulty urinating and dysuria.   Musculoskeletal: Negative for arthralgias and myalgias.   Skin: Negative for color change and pallor.   Neurological: Negative for dizziness, light-headedness and headaches.   Psychiatric/Behavioral: Negative for agitation and behavioral problems.       Objective:     /74   Pulse 79   Temp 97.8 °F (36.6 °C)   Ht 160 cm (63\")   Wt 84.4 kg (186 lb)   LMP 10/16/2020 (Exact Date)   SpO2 99%   BMI 32.95 kg/m²   Physical Exam  Constitutional:       General: She is not in acute distress.     Appearance: She is well-developed.   HENT:      Head: Normocephalic and atraumatic.      Right Ear: External ear normal.      Left Ear: External ear normal.      Nose: Nose normal.   Eyes:      Extraocular Movements: Extraocular movements intact.      Pupils: Pupils are equal, round, and reactive to light.   Neck:      Musculoskeletal: Normal range of motion and neck supple.   Cardiovascular:      Rate and Rhythm: Normal rate and regular rhythm.      Heart sounds: Normal heart sounds. No murmur. No friction rub. No gallop.    Pulmonary:      Effort: Pulmonary effort is normal. No respiratory distress.      Breath sounds: Normal breath sounds. No wheezing or rales.   Abdominal:      General: Bowel sounds are normal. There is " no distension.      Palpations: Abdomen is soft.      Tenderness: There is no abdominal tenderness.   Musculoskeletal: Normal range of motion.      Right lower leg: No edema.      Left lower leg: No edema.   Skin:     General: Skin is warm.      Findings: No erythema.   Neurological:      Mental Status: She is alert and oriented to person, place, and time. Mental status is at baseline.   Psychiatric:         Mood and Affect: Mood normal.         Behavior: Behavior normal.          Assessment/Plan:     Diagnoses and all orders for this visit:    1. Gastroesophageal reflux disease, unspecified whether esophagitis present (Primary)    Continue famotidine 20 mg twice daily.  Should return as needed or in 1 year for routine follow-up.    · Rx changes: None  · Patient Education:  Continue doing preventative measures to control reflux in addition to famotidine 20mg BID.   · Compliance at present is estimated to be excellent.   · Efforts to improve compliance (if necessary) will be directed at unnecessary at this time.     Follow-up:     Return in about 1 year (around 2/25/2022).    Preventative:  Health Maintenance   Topic Date Due   • ANNUAL PHYSICAL  04/05/1999   • INFLUENZA VACCINE  08/01/2020   • PAP SMEAR  09/27/2021   • CHLAMYDIA SCREENING  12/08/2021   • TDAP/TD VACCINES (2 - Td) 08/07/2024   • HEPATITIS C SCREENING  Completed   • MENINGOCOCCAL VACCINE  Completed   • HPV VACCINES  Completed   • Pneumococcal Vaccine 0-64  Aged Out       Recommended: none  Vaccine Counseling: N/A    Weight  -Class: Obese Class I: 30-34.9kg/m2  -Patient's Body mass index is 32.95 kg/m². BMI is above normal parameters. Recommendations include: exercise counseling and nutrition counseling.   eat more fruits and vegetables, decrease soda or juice intake, increase water intake, increase physical activity and reduce screen time    Alcohol use:  reports no history of alcohol use.  Nicotine status  reports that she has never smoked. She has  never used smokeless tobacco.    Goals        Patient Stated    • Reduce fat intake/eat healthier (pt-stated)      Barriers:  Likes [employer], but will try to eat it less frequently, e.g. Once every 2 weeks instead of weekly            RISK SCORE: 1          This document has been electronically signed by Mark Barba MD on February 25, 2021 16:09 CST

## 2021-03-03 ENCOUNTER — ROUTINE PRENATAL (OUTPATIENT)
Dept: OBSTETRICS AND GYNECOLOGY | Facility: CLINIC | Age: 26
End: 2021-03-03

## 2021-03-03 VITALS — DIASTOLIC BLOOD PRESSURE: 62 MMHG | BODY MASS INDEX: 33.3 KG/M2 | WEIGHT: 188 LBS | SYSTOLIC BLOOD PRESSURE: 108 MMHG

## 2021-03-03 DIAGNOSIS — O09.02 ENCOUNTER FOR SUPERVISION OF HIGH-RISK PREGNANCY WITH HISTORY OF INFERTILITY IN SECOND TRIMESTER: ICD-10-CM

## 2021-03-03 DIAGNOSIS — Z3A.20 20 WEEKS GESTATION OF PREGNANCY: Primary | ICD-10-CM

## 2021-03-03 PROCEDURE — 99212 OFFICE O/P EST SF 10 MIN: CPT | Performed by: NURSE PRACTITIONER

## 2021-03-03 NOTE — PROGRESS NOTES
CC: Prenatal visit    Yumiko Cooley is a 25 y.o.  at 19w5d.  Doing well.  Denies contractions, LOF, or VB.  Pt is wondering if Voltaren gel is okay to continue to take for her shoulder pain. 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     /62   Wt 85.3 kg (188 lb)   LMP 10/16/2020 (Exact Date)   BMI 33.30 kg/m²   SVE: Deferred     Fetal Heart Rate: 156     Reviewed preliminary Anatomy US: Female fetus,  grams, MVP 7.1cm, Posterior placenta, cephalic. All anatomy visualized.      Problems (from 20 to present)     Problem Noted Resolved    Poor weight gain of pregnancy, second trimester 2/10/2021 by Safia Escobedo APRN No    Supervision of high-risk pregnancy with history of infertility 2020 by Esperanza Soriano MD No    Overview Addendum 2020  7:39 AM by Esperanza Soriano MD     A NEG/ ABS NEG/ Rubella immune/ GBS @ 36wks  Dating: LMP ? 1TUS  Genetics: ?  Tdap: 28wks  Flu: will offer  Anatomy: 20wks  1h Glucola: 28wks  Lab Results   Component Value Date    HGB 12.8 10/16/2020    HCT 38.3 10/16/2020     10/16/2020     Feeding Method: Breastfeed  BC plan: ?    Conceived after [facility].           Rh negative status during pregnancy 2020 by Esperanza Soriano MD No          A/P: Yumiko Cooley is a 25 y.o.  at 19w5d.  - Counseled patient to avoid Voltaren as it is an NSAID; encourage to take tylenol to package dosing and heating pad as needed.  - Return for Centering Pregnancy next week  - Reviewed COVID-19 visitation policy  - Reviewed COVID-19 precautions     Diagnosis Plan   1. 20 weeks gestation of pregnancy     2. Encounter for supervision of high-risk pregnancy with history of infertility in second trimester       JOHANA Coates  3/3/2021  09:03 CST

## 2021-03-10 ENCOUNTER — ROUTINE PRENATAL (OUTPATIENT)
Dept: OBSTETRICS AND GYNECOLOGY | Facility: CLINIC | Age: 26
End: 2021-03-10

## 2021-03-10 VITALS — DIASTOLIC BLOOD PRESSURE: 70 MMHG | SYSTOLIC BLOOD PRESSURE: 107 MMHG | BODY MASS INDEX: 32.77 KG/M2 | WEIGHT: 185 LBS

## 2021-03-10 DIAGNOSIS — O09.02 ENCOUNTER FOR SUPERVISION OF HIGH-RISK PREGNANCY WITH HISTORY OF INFERTILITY IN SECOND TRIMESTER: Primary | ICD-10-CM

## 2021-03-10 DIAGNOSIS — Z3A.20 20 WEEKS GESTATION OF PREGNANCY: ICD-10-CM

## 2021-03-10 PROCEDURE — 99212 OFFICE O/P EST SF 10 MIN: CPT | Performed by: NURSE PRACTITIONER

## 2021-03-15 NOTE — PROGRESS NOTES
CC: Prenatal visit    Yumiko Cooley is a 25 y.o.  at 21w3d.  Doing well.  Denies contractions, LOF, or VB.  Reports good FM.    /70   Wt 83.9 kg (185 lb)   LMP 10/16/2020 (Exact Date)   BMI 32.77 kg/m²   SVE: Deferred  Fundal Height (cm): 20 cm  Fetal Heart Rate: 160     Problems (from 20 to present)     Problem Noted Resolved    Poor weight gain of pregnancy, second trimester 2/10/2021 by Safia Escobedo APRN No    Supervision of high-risk pregnancy with history of infertility 2020 by Esperanza Soriano MD No    Overview Addendum 3/15/2021  4:33 PM by Safia Escobedo APRN     A NEG/ ABS NEG/ Rubella immune/ GBS @ 36wks  Dating: LMP ? 1TUS  Genetics: declined  Tdap: 28wks  Flu: will offer  Anatomy: WNL Female Brenlee  1h Glucola: 28wks  Lab Results   Component Value Date    HGB 12.8 10/16/2020    HCT 38.3 10/16/2020     10/16/2020     Feeding Method: Breastfeed  BC plan: ?    Conceived after [facility].  Spouse: Li           Previous Version    Rh negative status during pregnancy 2020 by Esperanza Soriano MD No          A/P: Yumiko Cooley is a 25 y.o.  at 21w3d.  - Centering session #4 : contraceptives, PTL, and intimate partner violence reviewed  - RTC in 2 weeks for Centering session #5  - Reviewed COVID-19 visitation policy  - Reviewed COVID-19 precautions     Diagnosis Plan   1. Encounter for supervision of high-risk pregnancy with history of infertility in second trimester     2. 20 weeks gestation of pregnancy       JOHANA Coates  3/15/2021  16:38 CDT

## 2021-03-24 ENCOUNTER — ROUTINE PRENATAL (OUTPATIENT)
Dept: OBSTETRICS AND GYNECOLOGY | Facility: CLINIC | Age: 26
End: 2021-03-24

## 2021-03-24 VITALS — SYSTOLIC BLOOD PRESSURE: 118 MMHG | DIASTOLIC BLOOD PRESSURE: 60 MMHG | BODY MASS INDEX: 32.95 KG/M2 | WEIGHT: 186 LBS

## 2021-03-24 DIAGNOSIS — Z3A.22 22 WEEKS GESTATION OF PREGNANCY: Primary | ICD-10-CM

## 2021-03-24 DIAGNOSIS — O09.02 ENCOUNTER FOR SUPERVISION OF HIGH-RISK PREGNANCY WITH HISTORY OF INFERTILITY IN SECOND TRIMESTER: ICD-10-CM

## 2021-03-24 PROCEDURE — 99212 OFFICE O/P EST SF 10 MIN: CPT | Performed by: NURSE PRACTITIONER

## 2021-03-27 NOTE — PROGRESS NOTES
CC: Prenatal visit    Yumiko Cooley is a 25 y.o.  at 22w5d.  Doing well.  Denies contractions, LOF, or VB.  Reports good FM.    /60   Wt 84.4 kg (186 lb)   LMP 10/16/2020 (Exact Date)   BMI 32.95 kg/m²   SVE: Deferred  Fundal Height (cm): 21 cm  Fetal Heart Rate: 160     Problems (from 20 to present)     Problem Noted Resolved    Poor weight gain of pregnancy, second trimester 2/10/2021 by Safia Escobedo APRN No    Supervision of high-risk pregnancy with history of infertility 2020 by Esperanza Soriaon MD No    Overview Addendum 3/15/2021  4:33 PM by Safia Escobedo APRN     A NEG/ ABS NEG/ Rubella immune/ GBS @ 36wks  Dating: LMP ? 1TUS  Genetics: declined  Tdap: 28wks  Flu: will offer  Anatomy: WNL Female Brenlee  1h Glucola: 28wks  Lab Results   Component Value Date    HGB 12.8 10/16/2020    HCT 38.3 10/16/2020     10/16/2020     Feeding Method: Breastfeed  BC plan: ?    Conceived after [facility].  Spouse: Li           Previous Version    Rh negative status during pregnancy 2020 by Esperanza Soriano MD No          A/P: Yumiok Cooley is a 25 y.o.  at 22w5d.  - Centering #5: Labor video  - PTL precautions  - RTC in 2 weeks for Centering session #6  - Reviewed COVID-19 visitation policy  - Reviewed COVID-19 precautions     Diagnosis Plan   1. 22 weeks gestation of pregnancy     2. Encounter for supervision of high-risk pregnancy with history of infertility in second trimester       JOHANA Coates  3/27/2021  12:40 CDT

## 2021-04-07 ENCOUNTER — ROUTINE PRENATAL (OUTPATIENT)
Dept: OBSTETRICS AND GYNECOLOGY | Facility: CLINIC | Age: 26
End: 2021-04-07

## 2021-04-07 VITALS — DIASTOLIC BLOOD PRESSURE: 68 MMHG | SYSTOLIC BLOOD PRESSURE: 112 MMHG | WEIGHT: 186 LBS | BODY MASS INDEX: 32.95 KG/M2

## 2021-04-07 DIAGNOSIS — Z3A.24 24 WEEKS GESTATION OF PREGNANCY: ICD-10-CM

## 2021-04-07 DIAGNOSIS — O26.12 POOR WEIGHT GAIN OF PREGNANCY, SECOND TRIMESTER: ICD-10-CM

## 2021-04-07 DIAGNOSIS — Z36.2 ENCOUNTER FOR OTHER ANTENATAL SCREENING FOLLOW-UP: ICD-10-CM

## 2021-04-07 DIAGNOSIS — O09.02 ENCOUNTER FOR SUPERVISION OF HIGH-RISK PREGNANCY WITH HISTORY OF INFERTILITY IN SECOND TRIMESTER: Primary | ICD-10-CM

## 2021-04-07 PROCEDURE — 99214 OFFICE O/P EST MOD 30 MIN: CPT | Performed by: NURSE PRACTITIONER

## 2021-04-07 NOTE — PROGRESS NOTES
CC: Prenatal visit    Yumiko Cooley is a 26 y.o.  at 24w5d.  Doing well.  Denies contractions, LOF, or VB.  Reports good FM.    /68   Wt 84.4 kg (186 lb)   LMP 10/16/2020 (Exact Date)   BMI 32.95 kg/m²   SVE: Deferred  Fundal Height (cm): 23 cm  Fetal Heart Rate: 150     Problems (from 20 to present)     Problem Noted Resolved    Poor weight gain of pregnancy, second trimester 2/10/2021 by Safia Escobedo APRN No    Overview Addendum 2021 10:08 AM by Safia Escobedo APRN     Pre-pregnancy weight was 199, currently 186lbs - 2021  Consider GS at 32 weeks         Previous Version    Supervision of high-risk pregnancy with history of infertility 2020 by Esperanza Soriano MD No    Overview Addendum 3/15/2021  4:33 PM by Safia Escobedo APRN     A NEG/ ABS NEG/ Rubella immune/ GBS @ 36wks  Dating: LMP ? 1TUS  Genetics: declined  Tdap: 28wks  Flu: will offer  Anatomy: WNL Female Brenlee  1h Glucola: 28wks  Lab Results   Component Value Date    HGB 12.8 10/16/2020    HCT 38.3 10/16/2020     10/16/2020     Feeding Method: Breastfeed  BC plan: ?    Conceived after [facility].  Spouse: Li           Previous Version    Rh negative status during pregnancy 2020 by Esperanza Soriano MD No          A/P: Yumiko Cooley is a 26 y.o.  at 24w5d.  -Centering session #6: Reviewed pain management, epidural video, and indications for C/S.  - 3T labs and glucola in 2 weeks  - PTL precautions  - RTC in 2 weeks  - Reviewed COVID-19 visitation policy  - Reviewed COVID-19 precautions     Diagnosis Plan   1. Encounter for supervision of high-risk pregnancy with history of infertility in second trimester     2. Poor weight gain of pregnancy, second trimester     3. 24 weeks gestation of pregnancy     4. Encounter for other  screening follow-up  Glucose, Post 50 Gm Glucola    CBC Auto Differential    Antibody Screen     Safia  Sarahi, APRN  4/7/2021  10:15 CDT

## 2021-04-09 DIAGNOSIS — K21.9 GASTROESOPHAGEAL REFLUX DISEASE: ICD-10-CM

## 2021-04-09 RX ORDER — FAMOTIDINE 20 MG/1
TABLET, FILM COATED ORAL
Qty: 60 TABLET | Refills: 5 | Status: SHIPPED | OUTPATIENT
Start: 2021-04-09 | End: 2022-03-09

## 2021-04-21 ENCOUNTER — LAB (OUTPATIENT)
Dept: LAB | Facility: HOSPITAL | Age: 26
End: 2021-04-21

## 2021-04-21 ENCOUNTER — TELEPHONE (OUTPATIENT)
Dept: OBSTETRICS AND GYNECOLOGY | Facility: CLINIC | Age: 26
End: 2021-04-21

## 2021-04-21 ENCOUNTER — ROUTINE PRENATAL (OUTPATIENT)
Dept: OBSTETRICS AND GYNECOLOGY | Facility: CLINIC | Age: 26
End: 2021-04-21

## 2021-04-21 VITALS — WEIGHT: 188 LBS | DIASTOLIC BLOOD PRESSURE: 70 MMHG | BODY MASS INDEX: 33.3 KG/M2 | SYSTOLIC BLOOD PRESSURE: 123 MMHG

## 2021-04-21 DIAGNOSIS — Z36.2 ENCOUNTER FOR OTHER ANTENATAL SCREENING FOLLOW-UP: ICD-10-CM

## 2021-04-21 DIAGNOSIS — Z3A.26 26 WEEKS GESTATION OF PREGNANCY: Primary | ICD-10-CM

## 2021-04-21 DIAGNOSIS — O09.02 ENCOUNTER FOR SUPERVISION OF HIGH-RISK PREGNANCY WITH HISTORY OF INFERTILITY IN SECOND TRIMESTER: ICD-10-CM

## 2021-04-21 LAB
BASOPHILS # BLD AUTO: 0.05 10*3/MM3 (ref 0–0.2)
BASOPHILS NFR BLD AUTO: 0.5 % (ref 0–1.5)
BLD GP AB SCN SERPL QL: NEGATIVE
DEPRECATED RDW RBC AUTO: 38 FL (ref 37–54)
EOSINOPHIL # BLD AUTO: 0.06 10*3/MM3 (ref 0–0.4)
EOSINOPHIL NFR BLD AUTO: 0.6 % (ref 0.3–6.2)
ERYTHROCYTE [DISTWIDTH] IN BLOOD BY AUTOMATED COUNT: 13.2 % (ref 12.3–15.4)
GLUCOSE 1H P 100 G GLC PO SERPL-MCNC: 92 MG/DL (ref 60–140)
HCT VFR BLD AUTO: 32.4 % (ref 34–46.6)
HGB BLD-MCNC: 10.9 G/DL (ref 12–15.9)
IMM GRANULOCYTES # BLD AUTO: 0.06 10*3/MM3 (ref 0–0.05)
IMM GRANULOCYTES NFR BLD AUTO: 0.6 % (ref 0–0.5)
LYMPHOCYTES # BLD AUTO: 1.85 10*3/MM3 (ref 0.7–3.1)
LYMPHOCYTES NFR BLD AUTO: 17.8 % (ref 19.6–45.3)
MCH RBC QN AUTO: 27.3 PG (ref 26.6–33)
MCHC RBC AUTO-ENTMCNC: 33.6 G/DL (ref 31.5–35.7)
MCV RBC AUTO: 81.2 FL (ref 79–97)
MONOCYTES # BLD AUTO: 0.59 10*3/MM3 (ref 0.1–0.9)
MONOCYTES NFR BLD AUTO: 5.7 % (ref 5–12)
NEUTROPHILS NFR BLD AUTO: 7.81 10*3/MM3 (ref 1.7–7)
NEUTROPHILS NFR BLD AUTO: 74.8 % (ref 42.7–76)
NRBC BLD AUTO-RTO: 0 /100 WBC (ref 0–0.2)
PLATELET # BLD AUTO: 280 10*3/MM3 (ref 140–450)
PMV BLD AUTO: 12.2 FL (ref 6–12)
RBC # BLD AUTO: 3.99 10*6/MM3 (ref 3.77–5.28)
WBC # BLD AUTO: 10.42 10*3/MM3 (ref 3.4–10.8)

## 2021-04-21 PROCEDURE — 86850 RBC ANTIBODY SCREEN: CPT

## 2021-04-21 PROCEDURE — 82950 GLUCOSE TEST: CPT

## 2021-04-21 PROCEDURE — 99214 OFFICE O/P EST MOD 30 MIN: CPT | Performed by: NURSE PRACTITIONER

## 2021-04-21 PROCEDURE — 36415 COLL VENOUS BLD VENIPUNCTURE: CPT

## 2021-04-21 PROCEDURE — 85025 COMPLETE CBC W/AUTO DIFF WBC: CPT

## 2021-04-21 NOTE — PROGRESS NOTES
CC: Prenatal visit    Yumiko Cooley is a 26 y.o.  at 26w5d.  Doing well.  Denies contractions, LOF, or VB.  Reports good FM.     /70   Wt 85.3 kg (188 lb)   LMP 10/16/2020 (Exact Date)   BMI 33.30 kg/m²   SVE: Deferred  Fundal Height (cm): 25 cm  Fetal Heart Rate: 160     Problems (from 20 to present)     Problem Noted Resolved    Poor weight gain of pregnancy, second trimester 2/10/2021 by Safia Escobedo APRN No    Overview Addendum 2021 10:08 AM by Safia Escobedo APRN     Pre-pregnancy weight was 199, currently 186lbs - 2021  Consider GS at 32 weeks         Previous Version    Supervision of high-risk pregnancy with history of infertility 2020 by Esperanza Soriano MD No    Overview Addendum 2021 11:29 AM by Safia Escobedo APRN     A NEG/ ABS NEG/ Rubella immune/ GBS @ 36wks  Dating: LMP ? 1TUS  Genetics: declined  Tdap: 28wks  Flu: will offer  Anatomy: WNL Female Brenlee  1h Glucola: 28wks  Lab Results   Component Value Date    HGB 12.8 10/16/2020    HCT 38.3 10/16/2020     10/16/2020     Feeding Method: Breastfeed   Pump requested with [employer]  BC plan: ?    Conceived after [facility].  Spouse: Li           Previous Version    Rh negative status during pregnancy 2020 by Esperanza Soriano MD No          A/P: Yumiko Cooley is a 26 y.o.  at 26w5d.  - Glucola, ABS, and CBC today.  - Rhogam and Tdap at next appt  - Centering Session # 7 Breastfeeding  - Will follow-up with [facility] regarding breast pump rx.   - PTL precautions  - RTC in 2 weeks  - Reviewed COVID-19 visitation policy  - Reviewed COVID-19 precautions     Diagnosis Plan   1. 26 weeks gestation of pregnancy     2. Encounter for supervision of high-risk pregnancy with history of infertility in second trimester       JOHANA Coates  2021  11:34 CDT

## 2021-04-23 ENCOUNTER — TELEPHONE (OUTPATIENT)
Dept: OBSTETRICS AND GYNECOLOGY | Facility: CLINIC | Age: 26
End: 2021-04-23

## 2021-04-23 NOTE — TELEPHONE ENCOUNTER
"Patient called  C/o of a \"sunitha\" colored discharge with wiping. Denies recent intercourse. Counseled patient to monitor; bleeding precautions given.   "

## 2021-04-23 NOTE — TELEPHONE ENCOUNTER
Spoke to patient. Has not had anymore destiney colored vaginal discharge, just occurred that one time yesterday. Informed that  [facility] has received her breastpump order and will send her pump when she delivers. Also, counseled patient to not use the voltaren cream for shoulder pain especially not that she will be in her 3rd trimester; pt states she has stopped using it.

## 2021-04-23 NOTE — TELEPHONE ENCOUNTER
Spoke to [facility] they have received the breastpump order for patient and it will be shipped to patient when she delivers

## 2021-05-05 ENCOUNTER — ROUTINE PRENATAL (OUTPATIENT)
Dept: OBSTETRICS AND GYNECOLOGY | Facility: CLINIC | Age: 26
End: 2021-05-05

## 2021-05-05 VITALS — DIASTOLIC BLOOD PRESSURE: 70 MMHG | WEIGHT: 190.8 LBS | BODY MASS INDEX: 33.8 KG/M2 | SYSTOLIC BLOOD PRESSURE: 118 MMHG

## 2021-05-05 DIAGNOSIS — O26.891 RH NEGATIVE STATUS DURING PREGNANCY IN FIRST TRIMESTER: Primary | ICD-10-CM

## 2021-05-05 DIAGNOSIS — O09.03 ENCOUNTER FOR SUPERVISION OF HIGH-RISK PREGNANCY WITH HISTORY OF INFERTILITY IN THIRD TRIMESTER: ICD-10-CM

## 2021-05-05 DIAGNOSIS — Z3A.28 28 WEEKS GESTATION OF PREGNANCY: ICD-10-CM

## 2021-05-05 DIAGNOSIS — Z23 NEED FOR TDAP VACCINATION: ICD-10-CM

## 2021-05-05 DIAGNOSIS — Z67.91 RH NEGATIVE STATUS DURING PREGNANCY IN FIRST TRIMESTER: Primary | ICD-10-CM

## 2021-05-05 DIAGNOSIS — O26.12 POOR WEIGHT GAIN OF PREGNANCY, SECOND TRIMESTER: ICD-10-CM

## 2021-05-05 PROCEDURE — 99212 OFFICE O/P EST SF 10 MIN: CPT | Performed by: NURSE PRACTITIONER

## 2021-05-05 PROCEDURE — 90715 TDAP VACCINE 7 YRS/> IM: CPT | Performed by: NURSE PRACTITIONER

## 2021-05-05 PROCEDURE — 90471 IMMUNIZATION ADMIN: CPT | Performed by: NURSE PRACTITIONER

## 2021-05-05 PROCEDURE — 96372 THER/PROPH/DIAG INJ SC/IM: CPT | Performed by: NURSE PRACTITIONER

## 2021-05-05 NOTE — PROGRESS NOTES
CC: Prenatal visit    Yumiko Cooley is a 26 y.o.  at 28w5d.  Doing well.  Denies contractions, LOF, or VB.  Reports good FM. Desires Tdap and Rhogam today. Has gain 4 lbs since last appt.     /70   Wt 86.5 kg (190 lb 12.8 oz)   LMP 10/16/2020 (Exact Date)   BMI 33.80 kg/m²   SVE: Deferred  Fundal Height (cm): 28 cm  Fetal Heart Rate: 167     Problems (from 20 to present)     Problem Noted Resolved    Poor weight gain of pregnancy, second trimester 2/10/2021 by Safia Escobedo APRN No    Overview Addendum 2021  9:46 AM by Safia Escobedo APRN     Pre-pregnancy weight was 199, currently 186lbs - 2021  Consider GS at 32 weeks             Previous Version    Supervision of high-risk pregnancy with history of infertility 2020 by Esperanza Soriano MD No    Overview Addendum 2021  9:45 AM by Safia Escobedo APRN     A NEG/ ABS NEG/ Rubella immune/ GBS @ 36wks  Dating: LMP ? 1TUS  Genetics: declined  Tdap: given 21  Rhogam: given 21  Anatomy: WNL Female Brenlee  1h Glucola: passed  Lab Results   Component Value Date    HGB 12.8 10/16/2020    HCT 38.3 10/16/2020     10/16/2020     Feeding Method: Breastfeed   Pump requested with [employer]  BC plan: ?    Conceived after [facility].  Spouse: Li           Previous Version    Rh negative status during pregnancy 2020 by Esperanza Soriano MD No          A/P: Yumiko Cooley is a 26 y.o.  at 28w5d.  - Tdap and Rhogam given  - PTL and FKCs precautions  - RTC in 2  Weeks for Centering  - Reviewed COVID-19 visitation policy  - Reviewed COVID-19 precautions     Diagnosis Plan   1. Rh negative status during pregnancy in first trimester  Rhogam Immune Globulin Immunization   2. Poor weight gain of pregnancy, second trimester     3. Need for Tdap vaccination  Tdap Vaccine Greater Than or Equal To 6yo IM   4. 28 weeks gestation of pregnancy     5. Encounter for  supervision of high-risk pregnancy with history of infertility in third trimester       Safia Mcclain, APRN  5/5/2021  09:47 CDT

## 2021-05-19 ENCOUNTER — ROUTINE PRENATAL (OUTPATIENT)
Dept: OBSTETRICS AND GYNECOLOGY | Facility: CLINIC | Age: 26
End: 2021-05-19

## 2021-05-19 VITALS — DIASTOLIC BLOOD PRESSURE: 65 MMHG | SYSTOLIC BLOOD PRESSURE: 116 MMHG | BODY MASS INDEX: 33.3 KG/M2 | WEIGHT: 188 LBS

## 2021-05-19 DIAGNOSIS — O09.03 ENCOUNTER FOR SUPERVISION OF HIGH-RISK PREGNANCY WITH HISTORY OF INFERTILITY IN THIRD TRIMESTER: ICD-10-CM

## 2021-05-19 DIAGNOSIS — Z3A.30 30 WEEKS GESTATION OF PREGNANCY: Primary | ICD-10-CM

## 2021-05-19 DIAGNOSIS — O26.13 POOR WEIGHT GAIN OF PREGNANCY, THIRD TRIMESTER: ICD-10-CM

## 2021-05-19 PROCEDURE — 99212 OFFICE O/P EST SF 10 MIN: CPT | Performed by: NURSE PRACTITIONER

## 2021-05-19 NOTE — PROGRESS NOTES
CC: Prenatal visit    Yumiko Cooley is a 26 y.o.  at 30w5d.  Doing well.  Denies contractions, LOF, or VB.  Reports good FM.    /65   Wt 85.3 kg (188 lb)   LMP 10/16/2020 (Exact Date)   BMI 33.30 kg/m²   SVE: Deferred  Fundal Height (cm): 30 cm  Fetal Heart Rate: 157     Problems (from 20 to present)     Problem Noted Resolved    Poor weight gain of pregnancy, third trimester 2/10/2021 by Safia Escobedo APRN No    Overview Addendum 2021  4:17 PM by Safia Escobedo APRN     Pre-pregnancy weight was 199, currently 188lbs - 2021  Consider GS at 32 weeks             Previous Version    Supervision of high-risk pregnancy with history of infertility 2020 by Esperanza Soriano MD No    Overview Addendum 2021  4:15 PM by Safia Escobedo APRN     A NEG/ ABS NEG/ Rubella immune/ GBS @ 36wks  Dating: LMP ? 1TUS  Genetics: declined  Tdap: given 21  Rhogam: given 21  Anatomy: WNL Female Brenlee  1h Glucola: passed  Lab Results   Component Value Date    HGB 12.8 10/16/2020    HCT 38.3 10/16/2020     10/16/2020     Feeding Method: Breastfeed   Pump requested with [employer]  BC plan: ?    Conceived after [facility].  Spouse: Arnold           Previous Version    Rh negative status during pregnancy 2020 by Esperanza Soriano MD No          A/P: Yumiko Cooley is a 26 y.o.  at 30w5d.  - Growth scan in 2 weeks due to poor weight gain in pregnancy.   - Centering session:  care and Car seat safety  - PTL and fetal kick count precautions  - RTC in 2 weeks  - Reviewed COVID-19 visitation policy  - Reviewed COVID-19 precautions     Diagnosis Plan   1. 30 weeks gestation of pregnancy     2. Poor weight gain of pregnancy, third trimester  MFM OB US MAD   3. Encounter for supervision of high-risk pregnancy with history of infertility in third trimester       Safia Mcclain, APRN  2021  16:17 CDT

## 2021-05-27 ENCOUNTER — HOSPITAL ENCOUNTER (OUTPATIENT)
Facility: HOSPITAL | Age: 26
Discharge: HOME | End: 2021-05-27
Attending: OBSTETRICS & GYNECOLOGY | Admitting: OBSTETRICS & GYNECOLOGY

## 2021-05-27 ENCOUNTER — APPOINTMENT (OUTPATIENT)
Dept: ULTRASOUND IMAGING | Facility: HOSPITAL | Age: 26
End: 2021-05-27

## 2021-05-27 ENCOUNTER — HOSPITAL ENCOUNTER (OUTPATIENT)
Facility: HOSPITAL | Age: 26
End: 2021-05-27
Attending: OBSTETRICS & GYNECOLOGY | Admitting: OBSTETRICS & GYNECOLOGY

## 2021-05-27 VITALS
DIASTOLIC BLOOD PRESSURE: 64 MMHG | TEMPERATURE: 98.4 F | RESPIRATION RATE: 20 BRPM | SYSTOLIC BLOOD PRESSURE: 118 MMHG | OXYGEN SATURATION: 97 % | HEART RATE: 99 BPM

## 2021-05-27 LAB
BACTERIA UR QL AUTO: ABNORMAL /HPF
BILIRUB UR QL STRIP: NEGATIVE
CLARITY UR: ABNORMAL
COLOR UR: YELLOW
GLUCOSE UR STRIP-MCNC: NEGATIVE MG/DL
HGB UR QL STRIP.AUTO: NEGATIVE
HYALINE CASTS UR QL AUTO: ABNORMAL /LPF
KETONES UR QL STRIP: ABNORMAL
LEUKOCYTE ESTERASE UR QL STRIP.AUTO: ABNORMAL
NITRITE UR QL STRIP: NEGATIVE
PH UR STRIP.AUTO: 7 [PH] (ref 5–9)
PROT UR QL STRIP: NEGATIVE
RBC # UR: ABNORMAL /HPF
REF LAB TEST METHOD: ABNORMAL
SP GR UR STRIP: 1.01 (ref 1–1.03)
SQUAMOUS #/AREA URNS HPF: ABNORMAL /HPF
UROBILINOGEN UR QL STRIP: ABNORMAL
WBC UR QL AUTO: ABNORMAL /HPF

## 2021-05-27 PROCEDURE — 59025 FETAL NON-STRESS TEST: CPT | Performed by: OBSTETRICS & GYNECOLOGY

## 2021-05-27 PROCEDURE — 59025 FETAL NON-STRESS TEST: CPT

## 2021-05-27 PROCEDURE — 76817 TRANSVAGINAL US OBSTETRIC: CPT

## 2021-05-27 PROCEDURE — 76815 OB US LIMITED FETUS(S): CPT

## 2021-05-27 PROCEDURE — 81001 URINALYSIS AUTO W/SCOPE: CPT | Performed by: OBSTETRICS & GYNECOLOGY

## 2021-05-27 PROCEDURE — G0463 HOSPITAL OUTPT CLINIC VISIT: HCPCS

## 2021-05-28 NOTE — NON STRESS TEST
Yumiko Cooley, a  at 31w6d with an LUKASZ of 2021, by Last Menstrual Period, was seen at [facility] for a nonstress test.    Chief Complaint   Patient presents with    Abdominal Cramping    Pelvic Pain       Patient Active Problem List   Diagnosis    Anxiety    Arrhythmia    Chest pain    ALEJANDRO (dyspnea on exertion)    Palpitations    PAT (paroxysmal atrial tachycardia) (CMS/HCC)    SVT (supraventricular tachycardia) (CMS/[facility])    Supervision of high-risk pregnancy with history of infertility    Rh negative status during pregnancy    Poor weight gain of pregnancy, third trimester    Low back pain during pregnancy, second trimester       Start Time: 1745  Stop Time:     Interpretation A  Nonstress Test Interpretation A: Reactive (21 : Leigh Ann Lama, RN)  Comments A: confirmed with GIOVANNY Estrada RN (21 : Leigh Ann Lama, RN)    Had ua and ultrasound (vaginal and OB limited)  Patient presents with complaints of pelvic pressure.    Diagnosis pelvic pressure in pregnancy    I have reviewed fetal heart rate strip and agree reactive

## 2021-06-02 ENCOUNTER — LAB (OUTPATIENT)
Dept: LAB | Facility: HOSPITAL | Age: 26
End: 2021-06-02

## 2021-06-02 ENCOUNTER — ROUTINE PRENATAL (OUTPATIENT)
Dept: OBSTETRICS AND GYNECOLOGY | Facility: CLINIC | Age: 26
End: 2021-06-02

## 2021-06-02 VITALS — WEIGHT: 192 LBS | DIASTOLIC BLOOD PRESSURE: 72 MMHG | BODY MASS INDEX: 34.01 KG/M2 | SYSTOLIC BLOOD PRESSURE: 124 MMHG

## 2021-06-02 DIAGNOSIS — O36.5930 POOR FETAL GROWTH AFFECTING MANAGEMENT OF MOTHER IN THIRD TRIMESTER, SINGLE OR UNSPECIFIED FETUS: ICD-10-CM

## 2021-06-02 DIAGNOSIS — Z3A.32 32 WEEKS GESTATION OF PREGNANCY: ICD-10-CM

## 2021-06-02 DIAGNOSIS — Z13.79 ENCOUNTER FOR OTHER SCREENING FOR GENETIC AND CHROMOSOMAL ANOMALIES: ICD-10-CM

## 2021-06-02 DIAGNOSIS — O09.03 ENCOUNTER FOR SUPERVISION OF HIGH-RISK PREGNANCY WITH HISTORY OF INFERTILITY IN THIRD TRIMESTER: Primary | ICD-10-CM

## 2021-06-02 DIAGNOSIS — O26.13 POOR WEIGHT GAIN OF PREGNANCY, THIRD TRIMESTER: ICD-10-CM

## 2021-06-02 PROCEDURE — 0502F SUBSEQUENT PRENATAL CARE: CPT | Performed by: NURSE PRACTITIONER

## 2021-06-02 NOTE — PROGRESS NOTES
CC: Prenatal visit    Yumiko Cooley is a 26 y.o.  at 32w5d.  Doing well.  Denies contractions, LOF, or VB.  Reports good FM.    /72   Wt 87.1 kg (192 lb)   LMP 10/16/2020 (Exact Date)   BMI 34.01 kg/m²   SVE: Deferred   Fundal Height (cm): 32 cm  Fetal Heart Rate: 160     Problems (from 20 to present)     Problem Noted Resolved    Poor weight gain of pregnancy, third trimester 2/10/2021 by Safia Escobedo APRN No    Overview Addendum 2021  4:17 PM by Safia Escobedo APRN     Pre-pregnancy weight was 199, currently 188lbs - 2021  Consider GS at 32 weeks             Previous Version    Supervision of high-risk pregnancy with history of infertility 2020 by Esperanza Soriano MD No    Overview Addendum 2021  4:15 PM by Safia Escobedo APRN     A NEG/ ABS NEG/ Rubella immune/ GBS @ 36wks  Dating: LMP ? 1TUS  Genetics: declined  Tdap: given 21  Rhogam: given 21  Anatomy: WNL Female Brenlee  1h Glucola: passed  Lab Results   Component Value Date    HGB 12.8 10/16/2020    HCT 38.3 10/16/2020     10/16/2020     Feeding Method: Breastfeed   Pump requested with [employer]  BC plan: ?    Conceived after [facility].  Spouse: Arnold           Previous Version    Rh negative status during pregnancy 2020 by Esperanza Soriano MD No          A/P: Yumiko Cooley is a 26 y.o.  at 32w5d. Reviewed growth scan with patient and spouse. There is a 2 week lag in the femur length and a 10 day lag in the humerus length. Recommendation is follow-up US in 4 weeks and consider NIPS. Pt desires to proceed with NIPS today.  - Centering session: reviewed safe sleep, carseat safety, swaddling and diaper change, & preparation for L&D  -  labor and fetal kick count precautions  - RTC in 2 weeks OB appt and then 4 weeks for repeat US and OB appt.   - Reviewed COVID-19 visitation policy  - Reviewed COVID-19 precautions      Diagnosis Plan   1. Encounter for supervision of high-risk pregnancy with history of infertility in third trimester     2. Encounter for other screening for genetic and chromosomal anomalies  INVITAE NIPS   3. Poor weight gain of pregnancy, third trimester  MFM OB US MAD   4. Poor fetal growth affecting management of mother in third trimester, single or unspecified fetus  MFM OB US MAD   5. 32 weeks gestation of pregnancy       Safia Mcclain, JOHANA  6/2/2021  10:19 CDT

## 2021-06-08 ENCOUNTER — TELEPHONE (OUTPATIENT)
Dept: OBSTETRICS AND GYNECOLOGY | Facility: CLINIC | Age: 26
End: 2021-06-08

## 2021-06-11 DIAGNOSIS — Z13.79 ENCOUNTER FOR OTHER SCREENING FOR GENETIC AND CHROMOSOMAL ANOMALIES: ICD-10-CM

## 2021-06-16 ENCOUNTER — ROUTINE PRENATAL (OUTPATIENT)
Dept: OBSTETRICS AND GYNECOLOGY | Facility: CLINIC | Age: 26
End: 2021-06-16

## 2021-06-16 ENCOUNTER — LAB (OUTPATIENT)
Dept: LAB | Facility: HOSPITAL | Age: 26
End: 2021-06-16

## 2021-06-16 VITALS — WEIGHT: 197.8 LBS | BODY MASS INDEX: 35.04 KG/M2 | SYSTOLIC BLOOD PRESSURE: 118 MMHG | DIASTOLIC BLOOD PRESSURE: 72 MMHG

## 2021-06-16 DIAGNOSIS — Z3A.34 34 WEEKS GESTATION OF PREGNANCY: Primary | ICD-10-CM

## 2021-06-16 DIAGNOSIS — D50.9 MATERNAL IRON DEFICIENCY ANEMIA COMPLICATING PREGNANCY IN THIRD TRIMESTER: Primary | ICD-10-CM

## 2021-06-16 DIAGNOSIS — R30.0 DYSURIA DURING PREGNANCY IN THIRD TRIMESTER: ICD-10-CM

## 2021-06-16 DIAGNOSIS — O26.893 DYSURIA DURING PREGNANCY IN THIRD TRIMESTER: ICD-10-CM

## 2021-06-16 DIAGNOSIS — O99.013 MATERNAL IRON DEFICIENCY ANEMIA COMPLICATING PREGNANCY IN THIRD TRIMESTER: Primary | ICD-10-CM

## 2021-06-16 DIAGNOSIS — O09.03 ENCOUNTER FOR SUPERVISION OF HIGH-RISK PREGNANCY WITH HISTORY OF INFERTILITY IN THIRD TRIMESTER: ICD-10-CM

## 2021-06-16 DIAGNOSIS — O12.03 SWELLING OF LOWER EXTREMITY DURING PREGNANCY IN THIRD TRIMESTER: ICD-10-CM

## 2021-06-16 DIAGNOSIS — O26.13 POOR WEIGHT GAIN OF PREGNANCY, THIRD TRIMESTER: ICD-10-CM

## 2021-06-16 DIAGNOSIS — O26.893 RH NEGATIVE STATUS DURING PREGNANCY IN THIRD TRIMESTER: ICD-10-CM

## 2021-06-16 DIAGNOSIS — Z67.91 RH NEGATIVE STATUS DURING PREGNANCY IN THIRD TRIMESTER: ICD-10-CM

## 2021-06-16 LAB
ALBUMIN SERPL-MCNC: 3 G/DL (ref 3.5–5.2)
ALBUMIN/GLOB SERPL: 0.9 G/DL
ALP SERPL-CCNC: 172 U/L (ref 39–117)
ALT SERPL W P-5'-P-CCNC: 63 U/L (ref 1–33)
ANION GAP SERPL CALCULATED.3IONS-SCNC: 9 MMOL/L (ref 5–15)
AST SERPL-CCNC: 56 U/L (ref 1–32)
BACTERIA UR QL AUTO: ABNORMAL /HPF
BILIRUB SERPL-MCNC: 0.2 MG/DL (ref 0–1.2)
BILIRUB UR QL STRIP: NEGATIVE
BUN SERPL-MCNC: 3 MG/DL (ref 6–20)
BUN/CREAT SERPL: 6 (ref 7–25)
CALCIUM SPEC-SCNC: 8.6 MG/DL (ref 8.6–10.5)
CHLORIDE SERPL-SCNC: 110 MMOL/L (ref 98–107)
CLARITY UR: CLEAR
CO2 SERPL-SCNC: 21 MMOL/L (ref 22–29)
COLOR UR: YELLOW
CREAT SERPL-MCNC: 0.5 MG/DL (ref 0.57–1)
CREAT UR-MCNC: 42.7 MG/DL
DEPRECATED RDW RBC AUTO: 34.7 FL (ref 37–54)
ERYTHROCYTE [DISTWIDTH] IN BLOOD BY AUTOMATED COUNT: 12.4 % (ref 12.3–15.4)
GFR SERPL CREATININE-BSD FRML MDRD: 150 ML/MIN/1.73
GLOBULIN UR ELPH-MCNC: 3.3 GM/DL
GLUCOSE SERPL-MCNC: 108 MG/DL (ref 65–99)
GLUCOSE UR STRIP-MCNC: NEGATIVE MG/DL
HCT VFR BLD AUTO: 30.3 % (ref 34–46.6)
HGB BLD-MCNC: 9.8 G/DL (ref 12–15.9)
HGB UR QL STRIP.AUTO: NEGATIVE
HYALINE CASTS UR QL AUTO: ABNORMAL /LPF
KETONES UR QL STRIP: NEGATIVE
LEUKOCYTE ESTERASE UR QL STRIP.AUTO: ABNORMAL
MCH RBC QN AUTO: 24.7 PG (ref 26.6–33)
MCHC RBC AUTO-ENTMCNC: 32.3 G/DL (ref 31.5–35.7)
MCV RBC AUTO: 76.5 FL (ref 79–97)
NITRITE UR QL STRIP: NEGATIVE
PH UR STRIP.AUTO: 7.5 [PH] (ref 5–8)
PLATELET # BLD AUTO: 302 10*3/MM3 (ref 140–450)
PMV BLD AUTO: 12 FL (ref 6–12)
POTASSIUM SERPL-SCNC: 3.7 MMOL/L (ref 3.5–5.2)
PROT SERPL-MCNC: 6.3 G/DL (ref 6–8.5)
PROT UR QL STRIP: NEGATIVE
PROT UR-MCNC: 7 MG/DL
PROT/CREAT UR: 163.9 MG/G CREA (ref 0–200)
RBC # BLD AUTO: 3.96 10*6/MM3 (ref 3.77–5.28)
RBC # UR: ABNORMAL /HPF
REF LAB TEST METHOD: ABNORMAL
SODIUM SERPL-SCNC: 140 MMOL/L (ref 136–145)
SP GR UR STRIP: <1.005 (ref 1–1.03)
SQUAMOUS #/AREA URNS HPF: ABNORMAL /HPF
UROBILINOGEN UR QL STRIP: ABNORMAL
WBC # BLD AUTO: 10.61 10*3/MM3 (ref 3.4–10.8)
WBC UR QL AUTO: ABNORMAL /HPF

## 2021-06-16 PROCEDURE — 84156 ASSAY OF PROTEIN URINE: CPT | Performed by: FAMILY MEDICINE

## 2021-06-16 PROCEDURE — 80053 COMPREHEN METABOLIC PANEL: CPT | Performed by: FAMILY MEDICINE

## 2021-06-16 PROCEDURE — 85027 COMPLETE CBC AUTOMATED: CPT | Performed by: FAMILY MEDICINE

## 2021-06-16 PROCEDURE — 81001 URINALYSIS AUTO W/SCOPE: CPT

## 2021-06-16 PROCEDURE — 99214 OFFICE O/P EST MOD 30 MIN: CPT | Performed by: FAMILY MEDICINE

## 2021-06-16 PROCEDURE — 36415 COLL VENOUS BLD VENIPUNCTURE: CPT

## 2021-06-16 PROCEDURE — 82570 ASSAY OF URINE CREATININE: CPT | Performed by: FAMILY MEDICINE

## 2021-06-16 RX ORDER — DOCUSATE SODIUM 100 MG/1
100 CAPSULE, LIQUID FILLED ORAL 2 TIMES DAILY
Qty: 60 CAPSULE | Refills: 1 | Status: SHIPPED | OUTPATIENT
Start: 2021-06-16 | End: 2021-08-02

## 2021-06-16 RX ORDER — FERROUS SULFATE 325(65) MG
325 TABLET ORAL
Qty: 30 TABLET | Refills: 2 | Status: SHIPPED | OUTPATIENT
Start: 2021-06-16 | End: 2021-08-02

## 2021-06-16 NOTE — PROGRESS NOTES
CC: Prenatal visit    Yumiko Cooley is a 26 y.o.  at 34w5d.  Doing well.   Denies contractions, LOF, or VB.  Reports good FM.  Complains of pelvic pressure and dysuria.  She also reports swelling of hands/feet over the past week with intermittent visual disturbance.    /72   Wt 89.7 kg (197 lb 12.8 oz)   LMP 10/16/2020 (Exact Date)   BMI 35.04 kg/m²   SVE: deferred  Fundal Height (cm): 35 cm  Fetal Heart Rate: 152     Problems (from 20 to present)     Problem Noted Resolved    Poor weight gain of pregnancy, third trimester 2/10/2021 by Safia Escobedo APRN No    Overview Addendum 2021  4:17 PM by Safia Escobedo APRN     Pre-pregnancy weight was 199, currently 188lbs - 2021  Consider GS at 32 weeks             Previous Version    Supervision of high-risk pregnancy with history of infertility 2020 by Esperanza Soriano MD No    Overview Addendum 2021  4:15 PM by Safia Escobedo APRN     A NEG/ ABS NEG/ Rubella immune/ GBS @ 36wks  Dating: LMP ? 1TUS  Genetics: declined  Tdap: given 21  Rhogam: given 21  Anatomy: WNL Female Brenlee  1h Glucola: passed  Lab Results   Component Value Date    HGB 12.8 10/16/2020    HCT 38.3 10/16/2020     10/16/2020     Feeding Method: Breastfeed   Pump requested with [employer]  BC plan: ?    Conceived after [employer].  Spouse: Arnold           Previous Version    Rh negative status during pregnancy 2020 by Esperanza Soriano MD No          A/P: Yumiko Cooley is a 26 y.o.  at 34w5d.  - RTC in 2 weeks as scheduled for ultrasound/office visit  - obtain pre-eclampsia labs today; warning signs d/w patient  - UA/UCx today  - FKC reviewed.  PTL precautions given.  Encouraged to drink 3-4 glasses of ice water if she experiences contractions.  If contractions occur regularly (every 5-10 minutes or less) for 1 hour and do not resolve with drinking fluids and/or taking a warm  bath, patient advised to come to L&D for evaluation.       Diagnosis Plan   1. 34 weeks gestation of pregnancy     2. Poor weight gain of pregnancy, third trimester     3. Rh negative status during pregnancy in third trimester     4. Encounter for supervision of high-risk pregnancy with history of infertility in third trimester     5. Dysuria during pregnancy in third trimester  Urinalysis With Culture If Indicated - Urine, Clean Catch   6. Swelling of lower extremity during pregnancy in third trimester  Comprehensive Metabolic Panel    CBC (No Diff)    Protein / Creatinine Ratio, Urine - Urine, Clean Catch       Signature  Esperanza Soriano MD  [facility] [place] [address]  Office: [phone]      This document has been electronically signed by Esperanza Soriano MD on June 16, 2021 08:47 CDT

## 2021-06-22 ENCOUNTER — ANESTHESIA EVENT (OUTPATIENT)
Dept: OBSTETRICS AND GYNECOLOGY | Facility: CLINIC | Age: 26
End: 2021-06-22

## 2021-06-22 ENCOUNTER — HOSPITAL ENCOUNTER (OUTPATIENT)
Facility: HOSPITAL | Age: 26
Discharge: HOME | End: 2021-06-22
Attending: OBSTETRICS & GYNECOLOGY | Admitting: OBSTETRICS & GYNECOLOGY

## 2021-06-22 VITALS
SYSTOLIC BLOOD PRESSURE: 116 MMHG | DIASTOLIC BLOOD PRESSURE: 64 MMHG | HEART RATE: 90 BPM | TEMPERATURE: 97.9 F | RESPIRATION RATE: 20 BRPM

## 2021-06-22 LAB
ALBUMIN SERPL-MCNC: 3 G/DL (ref 3.5–5.2)
ALBUMIN/GLOB SERPL: 1 G/DL
ALP SERPL-CCNC: 200 U/L (ref 39–117)
ALT SERPL W P-5'-P-CCNC: 83 U/L (ref 1–33)
ANION GAP SERPL CALCULATED.3IONS-SCNC: 8 MMOL/L (ref 5–15)
AST SERPL-CCNC: 65 U/L (ref 1–32)
BACTERIA UR QL AUTO: ABNORMAL /HPF
BASOPHILS # BLD AUTO: 0.04 10*3/MM3 (ref 0–0.2)
BASOPHILS NFR BLD AUTO: 0.4 % (ref 0–1.5)
BILIRUB SERPL-MCNC: 0.2 MG/DL (ref 0–1.2)
BILIRUB UR QL STRIP: NEGATIVE
BUN SERPL-MCNC: 2 MG/DL (ref 6–20)
BUN/CREAT SERPL: 3.6 (ref 7–25)
CALCIUM SPEC-SCNC: 8.9 MG/DL (ref 8.6–10.5)
CHLORIDE SERPL-SCNC: 103 MMOL/L (ref 98–107)
CLARITY UR: ABNORMAL
CO2 SERPL-SCNC: 23 MMOL/L (ref 22–29)
COLOR UR: YELLOW
CREAT SERPL-MCNC: 0.55 MG/DL (ref 0.57–1)
CREAT UR-MCNC: 44.7 MG/DL
DEPRECATED RDW RBC AUTO: 34.8 FL (ref 37–54)
EOSINOPHIL # BLD AUTO: 0.04 10*3/MM3 (ref 0–0.4)
EOSINOPHIL NFR BLD AUTO: 0.4 % (ref 0.3–6.2)
ERYTHROCYTE [DISTWIDTH] IN BLOOD BY AUTOMATED COUNT: 13.1 % (ref 12.3–15.4)
GFR SERPL CREATININE-BSD FRML MDRD: 135 ML/MIN/1.73
GLOBULIN UR ELPH-MCNC: 2.9 GM/DL
GLUCOSE SERPL-MCNC: 73 MG/DL (ref 65–99)
GLUCOSE UR STRIP-MCNC: NEGATIVE MG/DL
HCT VFR BLD AUTO: 28.7 % (ref 34–46.6)
HGB BLD-MCNC: 9 G/DL (ref 12–15.9)
HGB UR QL STRIP.AUTO: NEGATIVE
HYALINE CASTS UR QL AUTO: ABNORMAL /LPF
IMM GRANULOCYTES # BLD AUTO: 0.14 10*3/MM3 (ref 0–0.05)
IMM GRANULOCYTES NFR BLD AUTO: 1.3 % (ref 0–0.5)
KETONES UR QL STRIP: NEGATIVE
LEUKOCYTE ESTERASE UR QL STRIP.AUTO: ABNORMAL
LYMPHOCYTES # BLD AUTO: 1.6 10*3/MM3 (ref 0.7–3.1)
LYMPHOCYTES NFR BLD AUTO: 15 % (ref 19.6–45.3)
MCH RBC QN AUTO: 23.7 PG (ref 26.6–33)
MCHC RBC AUTO-ENTMCNC: 31.4 G/DL (ref 31.5–35.7)
MCV RBC AUTO: 75.5 FL (ref 79–97)
MONOCYTES # BLD AUTO: 0.72 10*3/MM3 (ref 0.1–0.9)
MONOCYTES NFR BLD AUTO: 6.8 % (ref 5–12)
NEUTROPHILS NFR BLD AUTO: 76.1 % (ref 42.7–76)
NEUTROPHILS NFR BLD AUTO: 8.12 10*3/MM3 (ref 1.7–7)
NITRITE UR QL STRIP: NEGATIVE
NRBC BLD AUTO-RTO: 0 /100 WBC (ref 0–0.2)
PH UR STRIP.AUTO: 7 [PH] (ref 5–9)
PLATELET # BLD AUTO: 284 10*3/MM3 (ref 140–450)
PMV BLD AUTO: 12 FL (ref 6–12)
POTASSIUM SERPL-SCNC: 3.6 MMOL/L (ref 3.5–5.2)
PROT SERPL-MCNC: 5.9 G/DL (ref 6–8.5)
PROT UR QL STRIP: NEGATIVE
PROT UR-MCNC: 9 MG/DL
PROT/CREAT UR: 201.3 MG/G CREA (ref 0–200)
RBC # BLD AUTO: 3.8 10*6/MM3 (ref 3.77–5.28)
RBC # UR: ABNORMAL /HPF
REF LAB TEST METHOD: ABNORMAL
SODIUM SERPL-SCNC: 134 MMOL/L (ref 136–145)
SP GR UR STRIP: 1 (ref 1–1.03)
SQUAMOUS #/AREA URNS HPF: ABNORMAL /HPF
UROBILINOGEN UR QL STRIP: ABNORMAL
WBC # BLD AUTO: 10.66 10*3/MM3 (ref 3.4–10.8)
WBC UR QL AUTO: ABNORMAL /HPF

## 2021-06-22 PROCEDURE — G0463 HOSPITAL OUTPT CLINIC VISIT: HCPCS

## 2021-06-22 PROCEDURE — 82570 ASSAY OF URINE CREATININE: CPT | Performed by: OBSTETRICS & GYNECOLOGY

## 2021-06-22 PROCEDURE — 36415 COLL VENOUS BLD VENIPUNCTURE: CPT | Performed by: OBSTETRICS & GYNECOLOGY

## 2021-06-22 PROCEDURE — 80053 COMPREHEN METABOLIC PANEL: CPT | Performed by: OBSTETRICS & GYNECOLOGY

## 2021-06-22 PROCEDURE — 84156 ASSAY OF PROTEIN URINE: CPT | Performed by: OBSTETRICS & GYNECOLOGY

## 2021-06-22 PROCEDURE — 85025 COMPLETE CBC W/AUTO DIFF WBC: CPT | Performed by: OBSTETRICS & GYNECOLOGY

## 2021-06-22 PROCEDURE — 59025 FETAL NON-STRESS TEST: CPT

## 2021-06-22 PROCEDURE — 96372 THER/PROPH/DIAG INJ SC/IM: CPT

## 2021-06-22 PROCEDURE — 59025 FETAL NON-STRESS TEST: CPT | Performed by: OBSTETRICS & GYNECOLOGY

## 2021-06-22 PROCEDURE — 25010000002 BETAMETHASONE ACET & SOD PHOS PER 4 MG: Performed by: OBSTETRICS & GYNECOLOGY

## 2021-06-22 PROCEDURE — 81001 URINALYSIS AUTO W/SCOPE: CPT | Performed by: OBSTETRICS & GYNECOLOGY

## 2021-06-22 PROCEDURE — 82239 BILE ACIDS TOTAL: CPT | Performed by: OBSTETRICS & GYNECOLOGY

## 2021-06-22 RX ORDER — BETAMETHASONE SODIUM PHOSPHATE AND BETAMETHASONE ACETATE 3; 3 MG/ML; MG/ML
12 INJECTION, SUSPENSION INTRA-ARTICULAR; INTRALESIONAL; INTRAMUSCULAR; SOFT TISSUE EVERY 24 HOURS
Status: DISCONTINUED | OUTPATIENT
Start: 2021-06-22 | End: 2021-06-22 | Stop reason: HOSPADM

## 2021-06-22 RX ORDER — ACETAMINOPHEN 500 MG
1000 TABLET ORAL ONCE
Status: COMPLETED | OUTPATIENT
Start: 2021-06-22 | End: 2021-06-22

## 2021-06-22 RX ADMIN — ACETAMINOPHEN 1000 MG: 500 TABLET, FILM COATED ORAL at 13:07

## 2021-06-22 RX ADMIN — BETAMETHASONE SODIUM PHOSPHATE AND BETAMETHASONE ACETATE 12 MG: 3; 3 INJECTION, SUSPENSION INTRA-ARTICULAR; INTRALESIONAL; INTRAMUSCULAR at 15:34

## 2021-06-22 NOTE — NON STRESS TEST
Yumiko Cooley, a  at 35w4d with an LUKASZ of 2021, by Last Menstrual Period, was seen at [facility] LABOR DELIVERY for a nonstress test.    Chief Complaint   Patient presents with   • Abdominal Cramping   • Headache   • Pelvic Pain       Patient Active Problem List   Diagnosis   • Anxiety   • Arrhythmia   • Chest pain   • ALEJANDRO (dyspnea on exertion)   • Palpitations   • PAT (paroxysmal atrial tachycardia) (CMS/HCC)   • SVT (supraventricular tachycardia) (CMS/HCC)   • Supervision of high-risk pregnancy with history of infertility   • Rh negative status during pregnancy   • Poor weight gain of pregnancy, third trimester   • Low back pain during pregnancy, second trimester       Start Time: 1330  Stop Time: 1350    Interpretation A  Nonstress Test Interpretation A: Reactive (21 1350 : Noemi Estrada RN)  Comments A: confirmed with Caitlin SANDS (21 1350 : Noemi Estrada RN)

## 2021-06-23 ENCOUNTER — CLINICAL SUPPORT (OUTPATIENT)
Dept: OBSTETRICS AND GYNECOLOGY | Facility: CLINIC | Age: 26
End: 2021-06-23

## 2021-06-23 DIAGNOSIS — L29.9 PRURITUS: ICD-10-CM

## 2021-06-23 DIAGNOSIS — R79.89 ELEVATED LFTS: Primary | ICD-10-CM

## 2021-06-23 DIAGNOSIS — O09.03 ENCOUNTER FOR SUPERVISION OF HIGH-RISK PREGNANCY WITH HISTORY OF INFERTILITY IN THIRD TRIMESTER: Primary | ICD-10-CM

## 2021-06-23 LAB — BILE AC SERPL-SCNC: 9.8 UMOL/L (ref 0–10)

## 2021-06-23 PROCEDURE — 96372 THER/PROPH/DIAG INJ SC/IM: CPT | Performed by: NURSE PRACTITIONER

## 2021-06-23 RX ORDER — BETAMETHASONE SODIUM PHOSPHATE AND BETAMETHASONE ACETATE 3; 3 MG/ML; MG/ML
12 INJECTION, SUSPENSION INTRA-ARTICULAR; INTRALESIONAL; INTRAMUSCULAR; SOFT TISSUE EVERY 24 HOURS
Status: SHIPPED | OUTPATIENT
Start: 2021-06-23 | End: 2021-06-25

## 2021-06-23 RX ADMIN — BETAMETHASONE SODIUM PHOSPHATE AND BETAMETHASONE ACETATE 12 MG: 3; 3 INJECTION, SUSPENSION INTRA-ARTICULAR; INTRALESIONAL; INTRAMUSCULAR; SOFT TISSUE at 13:28

## 2021-06-24 ENCOUNTER — TELEPHONE (OUTPATIENT)
Dept: OBSTETRICS AND GYNECOLOGY | Facility: CLINIC | Age: 26
End: 2021-06-24

## 2021-06-24 NOTE — TELEPHONE ENCOUNTER
----- Message from Tessa Mendez MD sent at 6/23/2021 10:08 PM CDT -----  Please let her know that her bile acids are not quite elevated but given her symptoms and her elevated liver function tests, recommend repeating next week on Monday or Tuesday (order placed).

## 2021-06-25 PROCEDURE — 87635 SARS-COV-2 COVID-19 AMP PRB: CPT | Performed by: NURSE PRACTITIONER

## 2021-06-27 ENCOUNTER — APPOINTMENT (OUTPATIENT)
Dept: GENERAL RADIOLOGY | Facility: HOSPITAL | Age: 26
End: 2021-06-27

## 2021-06-27 ENCOUNTER — HOSPITAL ENCOUNTER (EMERGENCY)
Facility: HOSPITAL | Age: 26
Discharge: HOME | End: 2021-06-27
Attending: EMERGENCY MEDICINE | Admitting: EMERGENCY MEDICINE

## 2021-06-27 ENCOUNTER — APPOINTMENT (OUTPATIENT)
Dept: ULTRASOUND IMAGING | Facility: HOSPITAL | Age: 26
End: 2021-06-27

## 2021-06-27 ENCOUNTER — APPOINTMENT (OUTPATIENT)
Dept: CT IMAGING | Facility: HOSPITAL | Age: 26
End: 2021-06-27

## 2021-06-27 VITALS
WEIGHT: 190 LBS | OXYGEN SATURATION: 99 % | TEMPERATURE: 99.6 F | HEIGHT: 63 IN | DIASTOLIC BLOOD PRESSURE: 64 MMHG | BODY MASS INDEX: 33.66 KG/M2 | HEART RATE: 94 BPM | RESPIRATION RATE: 18 BRPM | SYSTOLIC BLOOD PRESSURE: 114 MMHG

## 2021-06-27 DIAGNOSIS — Z3A.36 36 WEEKS GESTATION OF PREGNANCY: ICD-10-CM

## 2021-06-27 DIAGNOSIS — U07.1 PNEUMONIA DUE TO COVID-19 VIRUS: Primary | ICD-10-CM

## 2021-06-27 DIAGNOSIS — J12.82 PNEUMONIA DUE TO COVID-19 VIRUS: Primary | ICD-10-CM

## 2021-06-27 LAB
ALBUMIN SERPL-MCNC: 2.9 G/DL (ref 3.5–5.2)
ALBUMIN/GLOB SERPL: 1 G/DL
ALP SERPL-CCNC: 215 U/L (ref 39–117)
ALT SERPL W P-5'-P-CCNC: 65 U/L (ref 1–33)
ANION GAP SERPL CALCULATED.3IONS-SCNC: 10 MMOL/L (ref 5–15)
APTT PPP: 29.5 SECONDS (ref 20–40.3)
AST SERPL-CCNC: 56 U/L (ref 1–32)
BACTERIA UR QL AUTO: ABNORMAL /HPF
BASOPHILS # BLD AUTO: 0.04 10*3/MM3 (ref 0–0.2)
BASOPHILS NFR BLD AUTO: 0.4 % (ref 0–1.5)
BILIRUB SERPL-MCNC: 0.2 MG/DL (ref 0–1.2)
BILIRUB UR QL STRIP: NEGATIVE
BUN SERPL-MCNC: 3 MG/DL (ref 6–20)
BUN/CREAT SERPL: 5.5 (ref 7–25)
CALCIUM SPEC-SCNC: 8.2 MG/DL (ref 8.6–10.5)
CHLORIDE SERPL-SCNC: 102 MMOL/L (ref 98–107)
CLARITY UR: CLEAR
CO2 SERPL-SCNC: 23 MMOL/L (ref 22–29)
COLOR UR: YELLOW
CREAT SERPL-MCNC: 0.55 MG/DL (ref 0.57–1)
D-DIMER, QUANTITATIVE (MAD,POW, STR): 1418 NG/ML (FEU) (ref 0–470)
DEPRECATED RDW RBC AUTO: 37.3 FL (ref 37–54)
EOSINOPHIL # BLD AUTO: 0.02 10*3/MM3 (ref 0–0.4)
EOSINOPHIL NFR BLD AUTO: 0.2 % (ref 0.3–6.2)
ERYTHROCYTE [DISTWIDTH] IN BLOOD BY AUTOMATED COUNT: 14.5 % (ref 12.3–15.4)
GFR SERPL CREATININE-BSD FRML MDRD: 135 ML/MIN/1.73
GLOBULIN UR ELPH-MCNC: 2.9 GM/DL
GLUCOSE SERPL-MCNC: 80 MG/DL (ref 65–99)
GLUCOSE UR STRIP-MCNC: NEGATIVE MG/DL
HCT VFR BLD AUTO: 29.7 % (ref 34–46.6)
HGB BLD-MCNC: 9.5 G/DL (ref 12–15.9)
HGB UR QL STRIP.AUTO: NEGATIVE
HOLD SPECIMEN: NORMAL
HYALINE CASTS UR QL AUTO: ABNORMAL /LPF
IMM GRANULOCYTES # BLD AUTO: 0.1 10*3/MM3 (ref 0–0.05)
IMM GRANULOCYTES NFR BLD AUTO: 1 % (ref 0–0.5)
INR PPP: 0.88 (ref 0.8–1.2)
KETONES UR QL STRIP: ABNORMAL
LEUKOCYTE ESTERASE UR QL STRIP.AUTO: ABNORMAL
LYMPHOCYTES # BLD AUTO: 0.75 10*3/MM3 (ref 0.7–3.1)
LYMPHOCYTES NFR BLD AUTO: 7.4 % (ref 19.6–45.3)
MCH RBC QN AUTO: 24.1 PG (ref 26.6–33)
MCHC RBC AUTO-ENTMCNC: 32 G/DL (ref 31.5–35.7)
MCV RBC AUTO: 75.2 FL (ref 79–97)
MONOCYTES # BLD AUTO: 0.44 10*3/MM3 (ref 0.1–0.9)
MONOCYTES NFR BLD AUTO: 4.4 % (ref 5–12)
MUCOUS THREADS URNS QL MICRO: ABNORMAL /HPF
NEUTROPHILS NFR BLD AUTO: 8.72 10*3/MM3 (ref 1.7–7)
NEUTROPHILS NFR BLD AUTO: 86.6 % (ref 42.7–76)
NITRITE UR QL STRIP: NEGATIVE
NRBC BLD AUTO-RTO: 0 /100 WBC (ref 0–0.2)
NT-PROBNP SERPL-MCNC: 113.1 PG/ML (ref 0–450)
PH UR STRIP.AUTO: 7 [PH] (ref 5–9)
PLATELET # BLD AUTO: 263 10*3/MM3 (ref 140–450)
PMV BLD AUTO: 11.7 FL (ref 6–12)
POTASSIUM SERPL-SCNC: 3.3 MMOL/L (ref 3.5–5.2)
PROCALCITONIN SERPL-MCNC: 0.2 NG/ML (ref 0–0.25)
PROT SERPL-MCNC: 5.8 G/DL (ref 6–8.5)
PROT UR QL STRIP: NEGATIVE
PROTHROMBIN TIME: 11.9 SECONDS (ref 11.1–15.3)
RBC # BLD AUTO: 3.95 10*6/MM3 (ref 3.77–5.28)
RBC # UR: ABNORMAL /HPF
REF LAB TEST METHOD: ABNORMAL
SODIUM SERPL-SCNC: 135 MMOL/L (ref 136–145)
SP GR UR STRIP: 1 (ref 1–1.03)
SQUAMOUS #/AREA URNS HPF: ABNORMAL /HPF
TROPONIN T SERPL-MCNC: <0.01 NG/ML (ref 0–0.03)
TROPONIN T SERPL-MCNC: <0.01 NG/ML (ref 0–0.03)
UROBILINOGEN UR QL STRIP: ABNORMAL
WBC # BLD AUTO: 10.07 10*3/MM3 (ref 3.4–10.8)
WBC UR QL AUTO: ABNORMAL /HPF

## 2021-06-27 PROCEDURE — 0 IOPAMIDOL PER 1 ML: Performed by: EMERGENCY MEDICINE

## 2021-06-27 PROCEDURE — 84484 ASSAY OF TROPONIN QUANT: CPT | Performed by: EMERGENCY MEDICINE

## 2021-06-27 PROCEDURE — 84145 PROCALCITONIN (PCT): CPT | Performed by: EMERGENCY MEDICINE

## 2021-06-27 PROCEDURE — 93010 ELECTROCARDIOGRAM REPORT: CPT | Performed by: INTERNAL MEDICINE

## 2021-06-27 PROCEDURE — 80053 COMPREHEN METABOLIC PANEL: CPT | Performed by: EMERGENCY MEDICINE

## 2021-06-27 PROCEDURE — 93005 ELECTROCARDIOGRAM TRACING: CPT | Performed by: EMERGENCY MEDICINE

## 2021-06-27 PROCEDURE — 96360 HYDRATION IV INFUSION INIT: CPT

## 2021-06-27 PROCEDURE — 81001 URINALYSIS AUTO W/SCOPE: CPT | Performed by: EMERGENCY MEDICINE

## 2021-06-27 PROCEDURE — 82239 BILE ACIDS TOTAL: CPT | Performed by: EMERGENCY MEDICINE

## 2021-06-27 PROCEDURE — 99284 EMERGENCY DEPT VISIT MOD MDM: CPT

## 2021-06-27 PROCEDURE — 85025 COMPLETE CBC W/AUTO DIFF WBC: CPT | Performed by: EMERGENCY MEDICINE

## 2021-06-27 PROCEDURE — 93005 ELECTROCARDIOGRAM TRACING: CPT

## 2021-06-27 PROCEDURE — 96361 HYDRATE IV INFUSION ADD-ON: CPT

## 2021-06-27 PROCEDURE — 83880 ASSAY OF NATRIURETIC PEPTIDE: CPT | Performed by: EMERGENCY MEDICINE

## 2021-06-27 PROCEDURE — 85379 FIBRIN DEGRADATION QUANT: CPT | Performed by: EMERGENCY MEDICINE

## 2021-06-27 PROCEDURE — 71275 CT ANGIOGRAPHY CHEST: CPT

## 2021-06-27 PROCEDURE — 85610 PROTHROMBIN TIME: CPT | Performed by: EMERGENCY MEDICINE

## 2021-06-27 PROCEDURE — 93970 EXTREMITY STUDY: CPT

## 2021-06-27 PROCEDURE — 85730 THROMBOPLASTIN TIME PARTIAL: CPT | Performed by: EMERGENCY MEDICINE

## 2021-06-27 PROCEDURE — 87653 STREP B DNA AMP PROBE: CPT | Performed by: OBSTETRICS & GYNECOLOGY

## 2021-06-27 RX ORDER — SODIUM CHLORIDE 0.9 % (FLUSH) 0.9 %
10 SYRINGE (ML) INJECTION AS NEEDED
Status: DISCONTINUED | OUTPATIENT
Start: 2021-06-27 | End: 2021-06-27 | Stop reason: HOSPADM

## 2021-06-27 RX ORDER — ALBUTEROL SULFATE 90 UG/1
2 AEROSOL, METERED RESPIRATORY (INHALATION) EVERY 4 HOURS PRN
Qty: 6.7 G | Refills: 0 | Status: SHIPPED | OUTPATIENT
Start: 2021-06-27 | End: 2021-08-24

## 2021-06-27 RX ORDER — DEXTROMETHORPHAN POLISTIREX 30 MG/5ML
60 SUSPENSION ORAL 2 TIMES DAILY PRN
Qty: 148 ML | Refills: 0 | Status: SHIPPED | OUTPATIENT
Start: 2021-06-27 | End: 2021-08-24

## 2021-06-27 RX ORDER — SODIUM CHLORIDE 9 MG/ML
125 INJECTION, SOLUTION INTRAVENOUS CONTINUOUS
Status: DISCONTINUED | OUTPATIENT
Start: 2021-06-27 | End: 2021-06-27 | Stop reason: HOSPADM

## 2021-06-27 RX ORDER — AZITHROMYCIN 250 MG/1
TABLET, FILM COATED ORAL
Qty: 6 TABLET | Refills: 0 | Status: SHIPPED | OUTPATIENT
Start: 2021-06-27 | End: 2021-08-24

## 2021-06-27 RX ORDER — ACETAMINOPHEN 325 MG/1
650 TABLET ORAL ONCE
Status: COMPLETED | OUTPATIENT
Start: 2021-06-27 | End: 2021-06-27

## 2021-06-27 RX ADMIN — SODIUM CHLORIDE 1000 ML: 900 INJECTION, SOLUTION INTRAVENOUS at 21:00

## 2021-06-27 RX ADMIN — SODIUM CHLORIDE 500 ML: 9 INJECTION, SOLUTION INTRAVENOUS at 18:00

## 2021-06-27 RX ADMIN — SODIUM CHLORIDE 125 ML/HR: 900 INJECTION, SOLUTION INTRAVENOUS at 19:12

## 2021-06-27 RX ADMIN — ACETAMINOPHEN 650 MG: 325 TABLET, FILM COATED ORAL at 21:00

## 2021-06-27 RX ADMIN — IOPAMIDOL 63 ML: 755 INJECTION, SOLUTION INTRAVENOUS at 20:37

## 2021-06-28 ENCOUNTER — HOSPITAL ENCOUNTER (OUTPATIENT)
Facility: HOSPITAL | Age: 26
Discharge: HOME | End: 2021-06-29
Attending: OBSTETRICS & GYNECOLOGY | Admitting: OBSTETRICS & GYNECOLOGY

## 2021-06-28 LAB
ABO GROUP BLD: NORMAL
BASOPHILS # BLD AUTO: 0.02 10*3/MM3 (ref 0–0.2)
BASOPHILS NFR BLD AUTO: 0.2 % (ref 0–1.5)
BLD GP AB SCN SERPL QL: POSITIVE
DEPRECATED RDW RBC AUTO: 37.5 FL (ref 37–54)
EOSINOPHIL # BLD AUTO: 0.02 10*3/MM3 (ref 0–0.4)
EOSINOPHIL NFR BLD AUTO: 0.2 % (ref 0.3–6.2)
ERYTHROCYTE [DISTWIDTH] IN BLOOD BY AUTOMATED COUNT: 14.3 % (ref 12.3–15.4)
HCT VFR BLD AUTO: 29.1 % (ref 34–46.6)
HGB BLD-MCNC: 9.4 G/DL (ref 12–15.9)
HOLD SPECIMEN: NORMAL
IMM GRANULOCYTES # BLD AUTO: 0.1 10*3/MM3 (ref 0–0.05)
IMM GRANULOCYTES NFR BLD AUTO: 1.1 % (ref 0–0.5)
LYMPHOCYTES # BLD AUTO: 0.66 10*3/MM3 (ref 0.7–3.1)
LYMPHOCYTES NFR BLD AUTO: 7.1 % (ref 19.6–45.3)
MCH RBC QN AUTO: 23.9 PG (ref 26.6–33)
MCHC RBC AUTO-ENTMCNC: 32.3 G/DL (ref 31.5–35.7)
MCV RBC AUTO: 74 FL (ref 79–97)
MONOCYTES # BLD AUTO: 0.31 10*3/MM3 (ref 0.1–0.9)
MONOCYTES NFR BLD AUTO: 3.3 % (ref 5–12)
NEUTROPHILS NFR BLD AUTO: 8.22 10*3/MM3 (ref 1.7–7)
NEUTROPHILS NFR BLD AUTO: 88.1 % (ref 42.7–76)
NRBC BLD AUTO-RTO: 0 /100 WBC (ref 0–0.2)
PLATELET # BLD AUTO: 246 10*3/MM3 (ref 140–450)
PMV BLD AUTO: 11.7 FL (ref 6–12)
QT INTERVAL: 312 MS
QTC INTERVAL: 414 MS
RBC # BLD AUTO: 3.93 10*6/MM3 (ref 3.77–5.28)
RH BLD: NEGATIVE
T&S EXPIRATION DATE: NORMAL
WBC # BLD AUTO: 9.33 10*3/MM3 (ref 3.4–10.8)

## 2021-06-28 PROCEDURE — 86870 RBC ANTIBODY IDENTIFICATION: CPT | Performed by: OBSTETRICS & GYNECOLOGY

## 2021-06-28 PROCEDURE — 36415 COLL VENOUS BLD VENIPUNCTURE: CPT | Performed by: OBSTETRICS & GYNECOLOGY

## 2021-06-28 PROCEDURE — 96361 HYDRATE IV INFUSION ADD-ON: CPT

## 2021-06-28 PROCEDURE — 86901 BLOOD TYPING SEROLOGIC RH(D): CPT | Performed by: OBSTETRICS & GYNECOLOGY

## 2021-06-28 PROCEDURE — 96360 HYDRATION IV INFUSION INIT: CPT

## 2021-06-28 PROCEDURE — 85025 COMPLETE CBC W/AUTO DIFF WBC: CPT | Performed by: OBSTETRICS & GYNECOLOGY

## 2021-06-28 PROCEDURE — 86900 BLOOD TYPING SEROLOGIC ABO: CPT | Performed by: OBSTETRICS & GYNECOLOGY

## 2021-06-28 PROCEDURE — 86850 RBC ANTIBODY SCREEN: CPT | Performed by: OBSTETRICS & GYNECOLOGY

## 2021-06-28 RX ORDER — ACETAMINOPHEN 500 MG
1000 TABLET ORAL ONCE
Status: DISCONTINUED | OUTPATIENT
Start: 2021-06-28 | End: 2021-06-29 | Stop reason: HOSPADM

## 2021-06-28 RX ORDER — ACETAMINOPHEN 500 MG
TABLET ORAL
Status: DISCONTINUED
Start: 2021-06-28 | End: 2021-06-29 | Stop reason: HOSPADM

## 2021-06-28 RX ORDER — ONDANSETRON HCL IN 0.9 % NACL 8 MG/50 ML
8 INTRAVENOUS SOLUTION, PIGGYBACK (ML) INTRAVENOUS ONCE
Status: COMPLETED | OUTPATIENT
Start: 2021-06-29 | End: 2021-06-28

## 2021-06-28 RX ORDER — SODIUM CHLORIDE, SODIUM LACTATE, POTASSIUM CHLORIDE, CALCIUM CHLORIDE 600; 310; 30; 20 MG/100ML; MG/100ML; MG/100ML; MG/100ML
INJECTION, SOLUTION INTRAVENOUS
Status: COMPLETED
Start: 2021-06-28 | End: 2021-06-28

## 2021-06-28 RX ORDER — FAMOTIDINE 20 MG/1
20 TABLET, FILM COATED ORAL ONCE
Status: COMPLETED | OUTPATIENT
Start: 2021-06-29 | End: 2021-06-29

## 2021-06-28 RX ORDER — SODIUM CHLORIDE, SODIUM LACTATE, POTASSIUM CHLORIDE, CALCIUM CHLORIDE 600; 310; 30; 20 MG/100ML; MG/100ML; MG/100ML; MG/100ML
INJECTION, SOLUTION INTRAVENOUS
Status: COMPLETED
Start: 2021-06-28 | End: 2021-06-29

## 2021-06-28 RX ORDER — BENZONATATE 100 MG/1
200 CAPSULE ORAL 3 TIMES DAILY PRN
Status: DISCONTINUED | OUTPATIENT
Start: 2021-06-28 | End: 2021-06-29 | Stop reason: HOSPADM

## 2021-06-28 RX ORDER — FAMOTIDINE 20 MG/1
20 TABLET, FILM COATED ORAL DAILY
Status: DISCONTINUED | OUTPATIENT
Start: 2021-06-29 | End: 2021-06-28

## 2021-06-28 RX ORDER — FAMOTIDINE 20 MG/1
20 TABLET, FILM COATED ORAL ONCE
Status: DISCONTINUED | OUTPATIENT
Start: 2021-06-29 | End: 2021-06-28

## 2021-06-28 RX ORDER — IPRATROPIUM BROMIDE AND ALBUTEROL SULFATE 2.5; .5 MG/3ML; MG/3ML
3 SOLUTION RESPIRATORY (INHALATION)
Status: DISCONTINUED | OUTPATIENT
Start: 2021-06-29 | End: 2021-06-29 | Stop reason: HOSPADM

## 2021-06-28 RX ADMIN — SODIUM CHLORIDE, POTASSIUM CHLORIDE, SODIUM LACTATE AND CALCIUM CHLORIDE 1000 ML: 600; 310; 30; 20 INJECTION, SOLUTION INTRAVENOUS at 21:27

## 2021-06-28 RX ADMIN — SODIUM CHLORIDE, POTASSIUM CHLORIDE, SODIUM LACTATE AND CALCIUM CHLORIDE 1000 ML: 600; 310; 30; 20 INJECTION, SOLUTION INTRAVENOUS at 23:56

## 2021-06-28 RX ADMIN — SODIUM CHLORIDE, POTASSIUM CHLORIDE, SODIUM LACTATE AND CALCIUM CHLORIDE 1000 ML: 600; 310; 30; 20 INJECTION, SOLUTION INTRAVENOUS at 22:40

## 2021-06-28 RX ADMIN — SODIUM CHLORIDE, POTASSIUM CHLORIDE, SODIUM LACTATE AND CALCIUM CHLORIDE 1000 ML: 600; 310; 30; 20 INJECTION, SOLUTION INTRAVENOUS at 21:45

## 2021-06-28 RX ADMIN — ONDANSETRON 8 MG: 2 INJECTION INTRAMUSCULAR; INTRAVENOUS at 23:55

## 2021-06-29 ENCOUNTER — TELEPHONE (OUTPATIENT)
Dept: OBSTETRICS AND GYNECOLOGY | Facility: CLINIC | Age: 26
End: 2021-06-29

## 2021-06-29 VITALS
OXYGEN SATURATION: 99 % | TEMPERATURE: 99.4 F | RESPIRATION RATE: 20 BRPM | DIASTOLIC BLOOD PRESSURE: 80 MMHG | HEART RATE: 91 BPM | SYSTOLIC BLOOD PRESSURE: 124 MMHG

## 2021-06-29 LAB
ANTI-D, PASSIVE: NORMAL
BILE AC SERPL-SCNC: 19.9 UMOL/L (ref 0–10)
GROUP B STREP, DNA: POSITIVE
Lab: NORMAL

## 2021-06-29 PROCEDURE — 59025 FETAL NON-STRESS TEST: CPT | Performed by: OBSTETRICS & GYNECOLOGY

## 2021-06-29 PROCEDURE — 59025 FETAL NON-STRESS TEST: CPT

## 2021-06-29 PROCEDURE — G0463 HOSPITAL OUTPT CLINIC VISIT: HCPCS

## 2021-06-29 PROCEDURE — 25010000002 ONDANSETRON PER 1 MG: Performed by: OBSTETRICS & GYNECOLOGY

## 2021-06-29 RX ADMIN — FAMOTIDINE 20 MG: 20 TABLET ORAL at 00:01

## 2021-06-29 RX ADMIN — BENZONATATE 200 MG: 100 CAPSULE ORAL at 00:40

## 2021-06-29 NOTE — TELEPHONE ENCOUNTER
Attempted to contact patient to notify her [facility] paperwork is complete there was no answer

## 2021-06-30 ENCOUNTER — ANESTHESIA EVENT (OUTPATIENT)
Dept: LABOR AND DELIVERY | Facility: HOSPITAL | Age: 26
End: 2021-06-30

## 2021-06-30 ENCOUNTER — ANESTHESIA (OUTPATIENT)
Dept: OBSTETRICS AND GYNECOLOGY | Facility: CLINIC | Age: 26
End: 2021-06-30

## 2021-06-30 ENCOUNTER — HOSPITAL ENCOUNTER (INPATIENT)
Facility: HOSPITAL | Age: 26
LOS: 3 days | Discharge: HOME | End: 2021-07-03
Attending: OBSTETRICS & GYNECOLOGY | Admitting: OBSTETRICS & GYNECOLOGY

## 2021-06-30 ENCOUNTER — ANESTHESIA (OUTPATIENT)
Dept: LABOR AND DELIVERY | Facility: HOSPITAL | Age: 26
End: 2021-06-30

## 2021-06-30 PROBLEM — O26.619 CHOLESTASIS DURING PREGNANCY: Status: ACTIVE | Noted: 2021-06-30

## 2021-06-30 PROBLEM — K83.1 CHOLESTASIS DURING PREGNANCY: Status: ACTIVE | Noted: 2021-06-30

## 2021-06-30 PROBLEM — O26.649 CHOLESTASIS DURING PREGNANCY: Status: ACTIVE | Noted: 2021-06-30

## 2021-06-30 LAB
ABO GROUP BLD: NORMAL
ALBUMIN SERPL-MCNC: 2.7 G/DL (ref 3.5–5.2)
ALBUMIN/GLOB SERPL: 0.8 G/DL
ALP SERPL-CCNC: 248 U/L (ref 39–117)
ALT SERPL W P-5'-P-CCNC: 50 U/L (ref 1–33)
AMPHET+METHAMPHET UR QL: NEGATIVE
AMPHETAMINES UR QL: NEGATIVE
ANION GAP SERPL CALCULATED.3IONS-SCNC: 15 MMOL/L (ref 5–15)
ANTI-D, PASSIVE: NORMAL
AST SERPL-CCNC: 71 U/L (ref 1–32)
BARBITURATES UR QL SCN: NEGATIVE
BENZODIAZ UR QL SCN: NEGATIVE
BILIRUB SERPL-MCNC: 0.5 MG/DL (ref 0–1.2)
BLD GP AB SCN SERPL QL: POSITIVE
BUN SERPL-MCNC: 3 MG/DL (ref 6–20)
BUN/CREAT SERPL: 4.8 (ref 7–25)
BUPRENORPHINE SERPL-MCNC: NEGATIVE NG/ML
CALCIUM SPEC-SCNC: 8.6 MG/DL (ref 8.6–10.5)
CANNABINOIDS SERPL QL: NEGATIVE
CHLORIDE SERPL-SCNC: 100 MMOL/L (ref 98–107)
CO2 SERPL-SCNC: 17 MMOL/L (ref 22–29)
COCAINE UR QL: NEGATIVE
CREAT SERPL-MCNC: 0.62 MG/DL (ref 0.57–1)
DEPRECATED RDW RBC AUTO: 37.1 FL (ref 37–54)
ERYTHROCYTE [DISTWIDTH] IN BLOOD BY AUTOMATED COUNT: 14.4 % (ref 12.3–15.4)
GFR SERPL CREATININE-BSD FRML MDRD: 117 ML/MIN/1.73
GLOBULIN UR ELPH-MCNC: 3.5 GM/DL
GLUCOSE SERPL-MCNC: 77 MG/DL (ref 65–99)
HCT VFR BLD AUTO: 30.5 % (ref 34–46.6)
HGB BLD-MCNC: 9.8 G/DL (ref 12–15.9)
HOLD SPECIMEN: NORMAL
Lab: NORMAL
MCH RBC QN AUTO: 23.6 PG (ref 26.6–33)
MCHC RBC AUTO-ENTMCNC: 32.1 G/DL (ref 31.5–35.7)
MCV RBC AUTO: 73.5 FL (ref 79–97)
METHADONE UR QL SCN: NEGATIVE
OPIATES UR QL: NEGATIVE
OXYCODONE UR QL SCN: NEGATIVE
PCP UR QL SCN: NEGATIVE
PLATELET # BLD AUTO: 230 10*3/MM3 (ref 140–450)
PMV BLD AUTO: 11.8 FL (ref 6–12)
POTASSIUM SERPL-SCNC: 3.5 MMOL/L (ref 3.5–5.2)
PROPOXYPH UR QL: NEGATIVE
PROT SERPL-MCNC: 6.2 G/DL (ref 6–8.5)
RBC # BLD AUTO: 4.15 10*6/MM3 (ref 3.77–5.28)
RH BLD: NEGATIVE
SODIUM SERPL-SCNC: 132 MMOL/L (ref 136–145)
T&S EXPIRATION DATE: NORMAL
TRICYCLICS UR QL SCN: NEGATIVE
WBC # BLD AUTO: 10.66 10*3/MM3 (ref 3.4–10.8)

## 2021-06-30 PROCEDURE — 25010000002 FENTANYL CITRATE (PF) 100 MCG/2ML SOLUTION: Performed by: NURSE ANESTHETIST, CERTIFIED REGISTERED

## 2021-06-30 PROCEDURE — 80306 DRUG TEST PRSMV INSTRMNT: CPT | Performed by: OBSTETRICS & GYNECOLOGY

## 2021-06-30 PROCEDURE — 86901 BLOOD TYPING SEROLOGIC RH(D): CPT | Performed by: OBSTETRICS & GYNECOLOGY

## 2021-06-30 PROCEDURE — 86900 BLOOD TYPING SEROLOGIC ABO: CPT | Performed by: OBSTETRICS & GYNECOLOGY

## 2021-06-30 PROCEDURE — 25010000003 PENICILLIN G POTASSIUM PER 600000 UNITS: Performed by: OBSTETRICS & GYNECOLOGY

## 2021-06-30 PROCEDURE — 85027 COMPLETE CBC AUTOMATED: CPT | Performed by: OBSTETRICS & GYNECOLOGY

## 2021-06-30 PROCEDURE — C1755 CATHETER, INTRASPINAL: HCPCS | Performed by: NURSE ANESTHETIST, CERTIFIED REGISTERED

## 2021-06-30 PROCEDURE — 80053 COMPREHEN METABOLIC PANEL: CPT | Performed by: OBSTETRICS & GYNECOLOGY

## 2021-06-30 PROCEDURE — 86850 RBC ANTIBODY SCREEN: CPT | Performed by: OBSTETRICS & GYNECOLOGY

## 2021-06-30 PROCEDURE — 25010000002 BUTORPHANOL PER 1 MG: Performed by: OBSTETRICS & GYNECOLOGY

## 2021-06-30 PROCEDURE — 86870 RBC ANTIBODY IDENTIFICATION: CPT | Performed by: OBSTETRICS & GYNECOLOGY

## 2021-06-30 PROCEDURE — S0260 H&P FOR SURGERY: HCPCS | Performed by: OBSTETRICS & GYNECOLOGY

## 2021-06-30 RX ORDER — DIPHENHYDRAMINE HYDROCHLORIDE 50 MG/ML
12.5 INJECTION INTRAMUSCULAR; INTRAVENOUS EVERY 8 HOURS PRN
Status: CANCELLED | OUTPATIENT
Start: 2021-06-30

## 2021-06-30 RX ORDER — ONDANSETRON 2 MG/ML
4 INJECTION INTRAMUSCULAR; INTRAVENOUS ONCE AS NEEDED
Status: CANCELLED | OUTPATIENT
Start: 2021-06-30

## 2021-06-30 RX ORDER — DIPHENHYDRAMINE HYDROCHLORIDE 50 MG/ML
25 INJECTION INTRAMUSCULAR; INTRAVENOUS EVERY 4 HOURS PRN
Status: CANCELLED | OUTPATIENT
Start: 2021-06-30

## 2021-06-30 RX ORDER — OXYTOCIN/0.9 % SODIUM CHLORIDE 30/500 ML
85 PLASTIC BAG, INJECTION (ML) INTRAVENOUS ONCE
Status: COMPLETED | OUTPATIENT
Start: 2021-07-01 | End: 2021-07-01

## 2021-06-30 RX ORDER — SODIUM CHLORIDE 0.9 % (FLUSH) 0.9 %
3 SYRINGE (ML) INJECTION EVERY 12 HOURS SCHEDULED
Status: DISCONTINUED | OUTPATIENT
Start: 2021-06-30 | End: 2021-07-01

## 2021-06-30 RX ORDER — LIDOCAINE HYDROCHLORIDE AND EPINEPHRINE 15; 5 MG/ML; UG/ML
INJECTION, SOLUTION EPIDURAL AS NEEDED
Status: DISCONTINUED | OUTPATIENT
Start: 2021-06-30 | End: 2021-08-05 | Stop reason: SURG

## 2021-06-30 RX ORDER — FAMOTIDINE 10 MG/ML
20 INJECTION, SOLUTION INTRAVENOUS ONCE AS NEEDED
Status: CANCELLED | OUTPATIENT
Start: 2021-06-30

## 2021-06-30 RX ORDER — DEXTROSE, SODIUM CHLORIDE, SODIUM LACTATE, POTASSIUM CHLORIDE, AND CALCIUM CHLORIDE 5; .6; .31; .03; .02 G/100ML; G/100ML; G/100ML; G/100ML; G/100ML
125 INJECTION, SOLUTION INTRAVENOUS CONTINUOUS
Status: DISCONTINUED | OUTPATIENT
Start: 2021-06-30 | End: 2021-06-30

## 2021-06-30 RX ORDER — OXYTOCIN/0.9 % SODIUM CHLORIDE 30/500 ML
650 PLASTIC BAG, INJECTION (ML) INTRAVENOUS ONCE
Status: COMPLETED | OUTPATIENT
Start: 2021-06-30 | End: 2021-07-01

## 2021-06-30 RX ORDER — PROMETHAZINE HYDROCHLORIDE 12.5 MG/1
12.5 SUPPOSITORY RECTAL EVERY 6 HOURS PRN
Status: DISCONTINUED | OUTPATIENT
Start: 2021-06-30 | End: 2021-07-01

## 2021-06-30 RX ORDER — SODIUM CHLORIDE, SODIUM LACTATE, POTASSIUM CHLORIDE, CALCIUM CHLORIDE 600; 310; 30; 20 MG/100ML; MG/100ML; MG/100ML; MG/100ML
125 INJECTION, SOLUTION INTRAVENOUS CONTINUOUS
Status: DISCONTINUED | OUTPATIENT
Start: 2021-06-30 | End: 2021-07-01

## 2021-06-30 RX ORDER — BUPIVACAINE HYDROCHLORIDE 2.5 MG/ML
INJECTION, SOLUTION EPIDURAL; INFILTRATION; INTRACAUDAL AS NEEDED
Status: DISCONTINUED | OUTPATIENT
Start: 2021-06-30 | End: 2021-08-05 | Stop reason: SURG

## 2021-06-30 RX ORDER — NALOXONE HCL 0.4 MG/ML
0.2 VIAL (ML) INJECTION
Status: CANCELLED | OUTPATIENT
Start: 2021-06-30

## 2021-06-30 RX ORDER — OXYTOCIN/0.9 % SODIUM CHLORIDE 30/500 ML
2-20 PLASTIC BAG, INJECTION (ML) INTRAVENOUS
Status: DISCONTINUED | OUTPATIENT
Start: 2021-06-30 | End: 2021-06-30 | Stop reason: SDUPTHER

## 2021-06-30 RX ORDER — PROMETHAZINE HYDROCHLORIDE 12.5 MG/1
12.5 TABLET ORAL EVERY 6 HOURS PRN
Status: DISCONTINUED | OUTPATIENT
Start: 2021-06-30 | End: 2021-07-01

## 2021-06-30 RX ORDER — DIPHENHYDRAMINE HCL 25 MG
25 CAPSULE ORAL EVERY 4 HOURS PRN
Status: CANCELLED | OUTPATIENT
Start: 2021-06-30

## 2021-06-30 RX ORDER — FENTANYL CITRATE 50 UG/ML
INJECTION, SOLUTION INTRAMUSCULAR; INTRAVENOUS AS NEEDED
Status: DISCONTINUED | OUTPATIENT
Start: 2021-06-30 | End: 2021-08-05 | Stop reason: SURG

## 2021-06-30 RX ORDER — EPHEDRINE SULFATE 50 MG/ML
5 INJECTION, SOLUTION INTRAVENOUS
Status: CANCELLED | OUTPATIENT
Start: 2021-06-30

## 2021-06-30 RX ORDER — SODIUM CHLORIDE 0.9 % (FLUSH) 0.9 %
3-10 SYRINGE (ML) INJECTION AS NEEDED
Status: DISCONTINUED | OUTPATIENT
Start: 2021-06-30 | End: 2021-07-01

## 2021-06-30 RX ORDER — LIDOCAINE HYDROCHLORIDE 10 MG/ML
5 INJECTION, SOLUTION EPIDURAL; INFILTRATION; INTRACAUDAL; PERINEURAL AS NEEDED
Status: DISCONTINUED | OUTPATIENT
Start: 2021-06-30 | End: 2021-07-01

## 2021-06-30 RX ORDER — ONDANSETRON 2 MG/ML
4 INJECTION INTRAMUSCULAR; INTRAVENOUS ONCE AS NEEDED
Status: CANCELLED | OUTPATIENT
Start: 2021-06-30 | End: 2021-07-01

## 2021-06-30 RX ORDER — MISOPROSTOL 100 MCG
50 TABLET ORAL EVERY 4 HOURS
Status: DISCONTINUED | OUTPATIENT
Start: 2021-06-30 | End: 2021-07-01

## 2021-06-30 RX ORDER — BUTORPHANOL TARTRATE 1 MG/ML
1 INJECTION, SOLUTION INTRAMUSCULAR; INTRAVENOUS
Status: DISCONTINUED | OUTPATIENT
Start: 2021-06-30 | End: 2021-07-01

## 2021-06-30 RX ORDER — ACETAMINOPHEN 500 MG
500 TABLET ORAL AS NEEDED
COMMUNITY
End: 2021-07-03 | Stop reason: HOSPADM

## 2021-06-30 RX ADMIN — MISOPROSTOL 50 MCG: 100 TABLET ORAL at 12:23

## 2021-06-30 RX ADMIN — Medication 10 ML/HR: at 20:13

## 2021-06-30 RX ADMIN — BUPIVACAINE HYDROCHLORIDE 8 ML: 2.5 INJECTION, SOLUTION EPIDURAL; INFILTRATION; INTRACAUDAL; PERINEURAL at 20:06

## 2021-06-30 RX ADMIN — SODIUM CHLORIDE 3 MILLION UNITS: 9 INJECTION, SOLUTION INTRAVENOUS at 21:13

## 2021-06-30 RX ADMIN — OXYTOCIN-SODIUM CHLORIDE 0.9% IV SOLN 30 UNIT/500ML 2 MILLI-UNITS/MIN: 30-0.9/5 SOLUTION at 18:03

## 2021-06-30 RX ADMIN — SODIUM CHLORIDE, POTASSIUM CHLORIDE, SODIUM LACTATE AND CALCIUM CHLORIDE 125 ML/HR: 600; 310; 30; 20 INJECTION, SOLUTION INTRAVENOUS at 20:32

## 2021-06-30 RX ADMIN — SODIUM CHLORIDE 3 MILLION UNITS: 9 INJECTION, SOLUTION INTRAVENOUS at 17:05

## 2021-06-30 RX ADMIN — SODIUM CHLORIDE, POTASSIUM CHLORIDE, SODIUM LACTATE AND CALCIUM CHLORIDE 1000 ML: 600; 310; 30; 20 INJECTION, SOLUTION INTRAVENOUS at 19:25

## 2021-06-30 RX ADMIN — SODIUM CHLORIDE 5 MILLION UNITS: 9 INJECTION, SOLUTION INTRAVENOUS at 13:01

## 2021-06-30 RX ADMIN — BUTORPHANOL TARTRATE 1 MG: 1 INJECTION, SOLUTION INTRAMUSCULAR; INTRAVENOUS at 13:08

## 2021-06-30 RX ADMIN — BUTORPHANOL TARTRATE 1 MG: 1 INJECTION, SOLUTION INTRAMUSCULAR; INTRAVENOUS at 17:05

## 2021-06-30 RX ADMIN — SODIUM CHLORIDE, POTASSIUM CHLORIDE, SODIUM LACTATE AND CALCIUM CHLORIDE 125 ML/HR: 600; 310; 30; 20 INJECTION, SOLUTION INTRAVENOUS at 11:55

## 2021-06-30 RX ADMIN — FENTANYL CITRATE 100 MCG: 50 INJECTION INTRAMUSCULAR; INTRAVENOUS at 20:06

## 2021-06-30 RX ADMIN — LIDOCAINE HYDROCHLORIDE AND EPINEPHRINE 3 MG: 15; 5 INJECTION, SOLUTION EPIDURAL at 20:04

## 2021-07-01 PROBLEM — U07.1 COVID-19 VIRUS DETECTED: Status: ACTIVE | Noted: 2021-07-01

## 2021-07-01 PROCEDURE — 0KQM0ZZ REPAIR PERINEUM MUSCLE, OPEN APPROACH: ICD-10-PCS | Performed by: OBSTETRICS & GYNECOLOGY

## 2021-07-01 PROCEDURE — 51703 INSERT BLADDER CATH COMPLEX: CPT

## 2021-07-01 PROCEDURE — 25010000003 PENICILLIN G POTASSIUM PER 600000 UNITS: Performed by: OBSTETRICS & GYNECOLOGY

## 2021-07-01 PROCEDURE — 59409 OBSTETRICAL CARE: CPT | Performed by: OBSTETRICS & GYNECOLOGY

## 2021-07-01 RX ORDER — OXYTOCIN/0.9 % SODIUM CHLORIDE 30/500 ML
2-20 PLASTIC BAG, INJECTION (ML) INTRAVENOUS
Status: DISCONTINUED | OUTPATIENT
Start: 2021-07-01 | End: 2021-07-01

## 2021-07-01 RX ORDER — LANOLIN 100 %
OINTMENT (GRAM) TOPICAL
Status: DISCONTINUED | OUTPATIENT
Start: 2021-07-01 | End: 2021-07-03 | Stop reason: HOSPADM

## 2021-07-01 RX ORDER — METHYLERGONOVINE MALEATE 0.2 MG/ML
200 INJECTION INTRAVENOUS ONCE AS NEEDED
Status: DISCONTINUED | OUTPATIENT
Start: 2021-07-01 | End: 2021-07-03 | Stop reason: HOSPADM

## 2021-07-01 RX ORDER — ACETAMINOPHEN 500 MG
1000 TABLET ORAL EVERY 6 HOURS
Status: DISCONTINUED | OUTPATIENT
Start: 2021-07-01 | End: 2021-07-03 | Stop reason: HOSPADM

## 2021-07-01 RX ORDER — HYDROCORTISONE 25 MG/G
1 CREAM TOPICAL AS NEEDED
Status: DISCONTINUED | OUTPATIENT
Start: 2021-07-01 | End: 2021-07-03 | Stop reason: HOSPADM

## 2021-07-01 RX ORDER — OXYTOCIN/0.9 % SODIUM CHLORIDE 30/500 ML
650 PLASTIC BAG, INJECTION (ML) INTRAVENOUS ONCE
Status: DISCONTINUED | OUTPATIENT
Start: 2021-07-01 | End: 2021-07-02

## 2021-07-01 RX ORDER — BISACODYL 10 MG
10 SUPPOSITORY, RECTAL RECTAL DAILY PRN
Status: DISCONTINUED | OUTPATIENT
Start: 2021-07-02 | End: 2021-07-03 | Stop reason: HOSPADM

## 2021-07-01 RX ORDER — IBUPROFEN 800 MG/1
800 TABLET ORAL EVERY 8 HOURS
Status: DISCONTINUED | OUTPATIENT
Start: 2021-07-01 | End: 2021-07-03 | Stop reason: HOSPADM

## 2021-07-01 RX ORDER — PRENATAL VIT/IRON FUM/FOLIC AC 27MG-0.8MG
1 TABLET ORAL DAILY
Status: DISCONTINUED | OUTPATIENT
Start: 2021-07-01 | End: 2021-07-03 | Stop reason: HOSPADM

## 2021-07-01 RX ORDER — ALBUTEROL SULFATE 90 UG/1
2 AEROSOL, METERED RESPIRATORY (INHALATION) EVERY 4 HOURS PRN
Status: DISCONTINUED | OUTPATIENT
Start: 2021-07-01 | End: 2021-07-03 | Stop reason: HOSPADM

## 2021-07-01 RX ORDER — OXYTOCIN/0.9 % SODIUM CHLORIDE 30/500 ML
85 PLASTIC BAG, INJECTION (ML) INTRAVENOUS ONCE
Status: DISCONTINUED | OUTPATIENT
Start: 2021-07-01 | End: 2021-07-02

## 2021-07-01 RX ORDER — DEXTROMETHORPHAN POLISTIREX 30 MG/5ML
60 SUSPENSION ORAL 2 TIMES DAILY PRN
Status: DISCONTINUED | OUTPATIENT
Start: 2021-07-01 | End: 2021-07-03 | Stop reason: HOSPADM

## 2021-07-01 RX ORDER — ONDANSETRON 2 MG/ML
4 INJECTION INTRAMUSCULAR; INTRAVENOUS EVERY 6 HOURS PRN
Status: DISCONTINUED | OUTPATIENT
Start: 2021-07-01 | End: 2021-07-03 | Stop reason: HOSPADM

## 2021-07-01 RX ORDER — CARBOPROST TROMETHAMINE 250 UG/ML
250 INJECTION, SOLUTION INTRAMUSCULAR AS NEEDED
Status: DISCONTINUED | OUTPATIENT
Start: 2021-07-01 | End: 2021-07-03 | Stop reason: HOSPADM

## 2021-07-01 RX ORDER — SODIUM CHLORIDE 0.9 % (FLUSH) 0.9 %
1-10 SYRINGE (ML) INJECTION AS NEEDED
Status: DISCONTINUED | OUTPATIENT
Start: 2021-07-01 | End: 2021-07-03 | Stop reason: HOSPADM

## 2021-07-01 RX ORDER — MISOPROSTOL 200 UG/1
800 TABLET ORAL AS NEEDED
Status: DISCONTINUED | OUTPATIENT
Start: 2021-07-01 | End: 2021-07-03 | Stop reason: HOSPADM

## 2021-07-01 RX ORDER — AZITHROMYCIN 250 MG/1
250 TABLET, FILM COATED ORAL
Status: COMPLETED | OUTPATIENT
Start: 2021-07-01 | End: 2021-07-03

## 2021-07-01 RX ORDER — CALCIUM CARBONATE 200(500)MG
2 TABLET,CHEWABLE ORAL 3 TIMES DAILY PRN
Status: DISCONTINUED | OUTPATIENT
Start: 2021-07-01 | End: 2021-07-03 | Stop reason: HOSPADM

## 2021-07-01 RX ORDER — DOCUSATE SODIUM 100 MG/1
100 CAPSULE, LIQUID FILLED ORAL 2 TIMES DAILY
Status: DISCONTINUED | OUTPATIENT
Start: 2021-07-01 | End: 2021-07-03 | Stop reason: HOSPADM

## 2021-07-01 RX ORDER — ONDANSETRON 4 MG/1
4 TABLET, FILM COATED ORAL EVERY 6 HOURS PRN
Status: DISCONTINUED | OUTPATIENT
Start: 2021-07-01 | End: 2021-07-03 | Stop reason: HOSPADM

## 2021-07-01 RX ORDER — FERROUS SULFATE TAB EC 324 MG (65 MG FE EQUIVALENT) 324 (65 FE) MG
324 TABLET DELAYED RESPONSE ORAL 2 TIMES DAILY WITH MEALS
Status: DISCONTINUED | OUTPATIENT
Start: 2021-07-01 | End: 2021-07-03 | Stop reason: HOSPADM

## 2021-07-01 RX ADMIN — ACETAMINOPHEN 1000 MG: 500 TABLET, FILM COATED ORAL at 19:11

## 2021-07-01 RX ADMIN — IBUPROFEN 800 MG: 800 TABLET, FILM COATED ORAL at 20:52

## 2021-07-01 RX ADMIN — MISOPROSTOL 1000 MCG: 200 TABLET ORAL at 08:30

## 2021-07-01 RX ADMIN — DOCUSATE SODIUM 100 MG: 100 CAPSULE, LIQUID FILLED ORAL at 12:47

## 2021-07-01 RX ADMIN — OXYTOCIN-SODIUM CHLORIDE 0.9% IV SOLN 30 UNIT/500ML 650 ML/HR: 30-0.9/5 SOLUTION at 08:17

## 2021-07-01 RX ADMIN — PRENATAL VIT W/ FE FUMARATE-FA TAB 27-0.8 MG 1 TABLET: 27-0.8 TAB at 12:46

## 2021-07-01 RX ADMIN — ACETAMINOPHEN 1000 MG: 500 TABLET, FILM COATED ORAL at 12:47

## 2021-07-01 RX ADMIN — Medication: at 12:46

## 2021-07-01 RX ADMIN — OXYTOCIN-SODIUM CHLORIDE 0.9% IV SOLN 30 UNIT/500ML 85 ML/HR: 30-0.9/5 SOLUTION at 08:50

## 2021-07-01 RX ADMIN — SODIUM CHLORIDE 3 MILLION UNITS: 9 INJECTION, SOLUTION INTRAVENOUS at 01:16

## 2021-07-01 RX ADMIN — SODIUM CHLORIDE, POTASSIUM CHLORIDE, SODIUM LACTATE AND CALCIUM CHLORIDE 125 ML/HR: 600; 310; 30; 20 INJECTION, SOLUTION INTRAVENOUS at 03:57

## 2021-07-01 RX ADMIN — SODIUM CHLORIDE 3 MILLION UNITS: 9 INJECTION, SOLUTION INTRAVENOUS at 05:30

## 2021-07-01 RX ADMIN — AZITHROMYCIN MONOHYDRATE 250 MG: 250 TABLET ORAL at 12:47

## 2021-07-01 RX ADMIN — BUPIVACAINE HYDROCHLORIDE 8 ML: 2.5 INJECTION, SOLUTION EPIDURAL; INFILTRATION; INTRACAUDAL; PERINEURAL at 02:47

## 2021-07-01 RX ADMIN — IBUPROFEN 800 MG: 800 TABLET, FILM COATED ORAL at 12:47

## 2021-07-01 RX ADMIN — FERROUS SULFATE TAB EC 324 MG (65 MG FE EQUIVALENT) 324 MG: 324 (65 FE) TABLET DELAYED RESPONSE at 12:50

## 2021-07-01 RX ADMIN — DOCUSATE SODIUM 100 MG: 100 CAPSULE, LIQUID FILLED ORAL at 20:52

## 2021-07-01 NOTE — PLAN OF CARE
Problem: Adult Inpatient Plan of Care  Goal: Plan of Care Review  Outcome: Ongoing, Progressing  Flowsheets (Taken 6/30/2021 1902)  Progress: improving  Plan of Care Reviewed With:   patient   spouse  Outcome Summary:   VSS   patient afebrile on shift.  SVE 4. AROM at 1742, clear fluid   Pitocin @ 2.   Goal Outcome Evaluation:  Plan of Care Reviewed With: patient, spouse        Progress: improving  Outcome Summary: VSS; patient afebrile on shift.  SVE 4. AROM at 1742, clear fluid; Pitocin @ 2.

## 2021-07-01 NOTE — PLAN OF CARE
Goal Outcome Evaluation:              Outcome Summary: VSS, Vaginal delivery @ 0814 , Iv saline locked, attempting to breastfeed needs more reassurance and help. fundus firm, bleeding rubra and light

## 2021-07-01 NOTE — L&D DELIVERY NOTE
[facility]  Vaginal Delivery Note    Delivery     Delivery:   Spontaneous vaginal   YOB: 2020    Time of Birth: 8:14 AM      Anesthesia:   Epidural   Delivering clinician:        Delivery narrative: Patient is a 26-year-old G1 now  at 36 weeks and 6 days undergoing induction of labor for cholestasis of pregnancy.  Patient progressed to complete complete +2 station with good maternal effort delivered a female infant in the OA presentation.  Single nuchal cord was noted and reduced without difficulty.  Right shoulder delivered followed by posterior shoulder and corpus.  Infant was placed on maternal abdomen.  Delayed cord clamping was performed.  Cord was then clamped x2 and cut by father of baby.  Cord blood was obtained.  Placenta then delivered intact without difficulty.  Second-degree midline and periclitoral laceration was noted.  This was repaired in the normal standard fashion using 3-0 Vicryl.  Mother and infant were both stable when I left the room.    Complications  Cholestasis of pregnancy    Infant    Findings: female  infant     Infant observations: Weight: No birth weight on file.   Length:   in  Observations/Comments:        Apgars:   @ 1 minute /      @ 5 minutes     Placenta, Cord, and Fluid    Placenta delivered    at        Cord:   present.   Nuchal Cord?  yes; Number of nuchal loops present:      Cord blood obtained:  Yes   Cord gases obtained:   No     Repair    Episiotomy: Not recorded    Lacerations: Yes  Laceration Information  Laceration Repaired?   Perineal:  Second-degree  Yes   Periurethral:  Periclitoral  Yes   Labial:       Sulcus:       Vaginal:       Cervical:         Suture used for repair: 3-0 Vicryl   Estimated Blood Loss:  250 mL     Disposition  Mother to Mother Baby/Postpartum  in stable condition currently.  Baby to remains with mom  in stable condition currently.

## 2021-07-01 NOTE — L&D DELIVERY NOTE
I release pitocin orders and pharmacy discontinue my titrate pitocin. At 2208 pitocin was still 6 mu and not 12 as appear in the chart

## 2021-07-01 NOTE — PLAN OF CARE
Goal Outcome Evaluation:  Plan of Care Reviewed With: patient, spouse        Progress: improving  Outcome Summary: VSS, patient SVE at 9.5. at 7 a.m, pitocin @ 8. epidural and urethral catheter in place with adequate output

## 2021-07-02 LAB
ALBUMIN SERPL-MCNC: 2.4 G/DL (ref 3.5–5.2)
ALBUMIN/GLOB SERPL: 0.8 G/DL
ALP SERPL-CCNC: 194 U/L (ref 39–117)
ALT SERPL W P-5'-P-CCNC: 29 U/L (ref 1–33)
ANION GAP SERPL CALCULATED.3IONS-SCNC: 6 MMOL/L (ref 5–15)
ANISOCYTOSIS BLD QL: ABNORMAL
AST SERPL-CCNC: 38 U/L (ref 1–32)
BASOPHILS # BLD AUTO: 0.04 10*3/MM3 (ref 0–0.2)
BASOPHILS # BLD MANUAL: 0.18 10*3/MM3 (ref 0–0.2)
BASOPHILS NFR BLD AUTO: 0.2 % (ref 0–1.5)
BASOPHILS NFR BLD AUTO: 1 % (ref 0–1.5)
BILIRUB SERPL-MCNC: 0.4 MG/DL (ref 0–1.2)
BUN SERPL-MCNC: 5 MG/DL (ref 6–20)
BUN/CREAT SERPL: 9.3 (ref 7–25)
CALCIUM SPEC-SCNC: 8.7 MG/DL (ref 8.6–10.5)
CHLORIDE SERPL-SCNC: 103 MMOL/L (ref 98–107)
CO2 SERPL-SCNC: 22 MMOL/L (ref 22–29)
CREAT SERPL-MCNC: 0.54 MG/DL (ref 0.57–1)
DEPRECATED RDW RBC AUTO: 36.2 FL (ref 37–54)
EOSINOPHIL # BLD AUTO: 0.12 10*3/MM3 (ref 0–0.4)
EOSINOPHIL NFR BLD AUTO: 0.7 % (ref 0.3–6.2)
ERYTHROCYTE [DISTWIDTH] IN BLOOD BY AUTOMATED COUNT: 14.4 % (ref 12.3–15.4)
GFR SERPL CREATININE-BSD FRML MDRD: 138 ML/MIN/1.73
GLOBULIN UR ELPH-MCNC: 2.9 GM/DL
GLUCOSE BLDC GLUCOMTR-MCNC: 71 MG/DL (ref 70–130)
GLUCOSE SERPL-MCNC: 72 MG/DL (ref 65–99)
HCT VFR BLD AUTO: 29.5 % (ref 34–46.6)
HGB BLD-MCNC: 9.7 G/DL (ref 12–15.9)
IMM GRANULOCYTES # BLD AUTO: 0.35 10*3/MM3 (ref 0–0.05)
IMM GRANULOCYTES NFR BLD AUTO: 2 % (ref 0–0.5)
LARGE PLATELETS: ABNORMAL
LYMPHOCYTES # BLD AUTO: 4.7 10*3/MM3 (ref 0.7–3.1)
LYMPHOCYTES # BLD MANUAL: 3.5 10*3/MM3 (ref 0.7–3.1)
LYMPHOCYTES NFR BLD AUTO: 26.8 % (ref 19.6–45.3)
LYMPHOCYTES NFR BLD MANUAL: 1 % (ref 5–12)
LYMPHOCYTES NFR BLD MANUAL: 17 % (ref 19.6–45.3)
MCH RBC QN AUTO: 24 PG (ref 26.6–33)
MCHC RBC AUTO-ENTMCNC: 32.9 G/DL (ref 31.5–35.7)
MCV RBC AUTO: 72.8 FL (ref 79–97)
MONOCYTES # BLD AUTO: 0.18 10*3/MM3 (ref 0.1–0.9)
MONOCYTES # BLD AUTO: 0.66 10*3/MM3 (ref 0.1–0.9)
MONOCYTES NFR BLD AUTO: 3.8 % (ref 5–12)
NEUTROPHILS # BLD AUTO: 13.67 10*3/MM3 (ref 1.7–7)
NEUTROPHILS NFR BLD AUTO: 11.65 10*3/MM3 (ref 1.7–7)
NEUTROPHILS NFR BLD AUTO: 66.5 % (ref 42.7–76)
NEUTROPHILS NFR BLD MANUAL: 78 % (ref 42.7–76)
NEUTS HYPERSEG # BLD: ABNORMAL 10*3/UL
NEUTS VAC BLD QL SMEAR: ABNORMAL
NRBC BLD AUTO-RTO: 0 /100 WBC (ref 0–0.2)
PLATELET # BLD AUTO: 313 10*3/MM3 (ref 140–450)
PMV BLD AUTO: 11.4 FL (ref 6–12)
POLYCHROMASIA BLD QL SMEAR: ABNORMAL
POTASSIUM SERPL-SCNC: 3.2 MMOL/L (ref 3.5–5.2)
PROT SERPL-MCNC: 5.3 G/DL (ref 6–8.5)
RBC # BLD AUTO: 4.05 10*6/MM3 (ref 3.77–5.28)
SMALL PLATELETS BLD QL SMEAR: ADEQUATE
SODIUM SERPL-SCNC: 131 MMOL/L (ref 136–145)
T4 FREE SERPL-MCNC: 0.96 NG/DL (ref 0.93–1.7)
TSH SERPL DL<=0.05 MIU/L-ACNC: 8.03 UIU/ML (ref 0.27–4.2)
VARIANT LYMPHS NFR BLD MANUAL: 3 % (ref 0–5)
WBC # BLD AUTO: 17.52 10*3/MM3 (ref 3.4–10.8)

## 2021-07-02 PROCEDURE — 84443 ASSAY THYROID STIM HORMONE: CPT | Performed by: OBSTETRICS & GYNECOLOGY

## 2021-07-02 PROCEDURE — 84439 ASSAY OF FREE THYROXINE: CPT | Performed by: OBSTETRICS & GYNECOLOGY

## 2021-07-02 PROCEDURE — 85025 COMPLETE CBC W/AUTO DIFF WBC: CPT | Performed by: OBSTETRICS & GYNECOLOGY

## 2021-07-02 PROCEDURE — 82962 GLUCOSE BLOOD TEST: CPT

## 2021-07-02 PROCEDURE — 85007 BL SMEAR W/DIFF WBC COUNT: CPT | Performed by: OBSTETRICS & GYNECOLOGY

## 2021-07-02 PROCEDURE — 80053 COMPREHEN METABOLIC PANEL: CPT | Performed by: OBSTETRICS & GYNECOLOGY

## 2021-07-02 PROCEDURE — 99232 SBSQ HOSP IP/OBS MODERATE 35: CPT | Performed by: OBSTETRICS & GYNECOLOGY

## 2021-07-02 RX ORDER — LEVOTHYROXINE SODIUM 0.1 MG/1
100 TABLET ORAL
Status: DISCONTINUED | OUTPATIENT
Start: 2021-07-02 | End: 2021-07-03 | Stop reason: HOSPADM

## 2021-07-02 RX ADMIN — ACETAMINOPHEN 1000 MG: 500 TABLET, FILM COATED ORAL at 10:22

## 2021-07-02 RX ADMIN — AZITHROMYCIN MONOHYDRATE 250 MG: 250 TABLET ORAL at 10:22

## 2021-07-02 RX ADMIN — FERROUS SULFATE TAB EC 324 MG (65 MG FE EQUIVALENT) 324 MG: 324 (65 FE) TABLET DELAYED RESPONSE at 10:22

## 2021-07-02 RX ADMIN — LEVOTHYROXINE SODIUM 100 MCG: 100 TABLET ORAL at 12:33

## 2021-07-02 RX ADMIN — PRENATAL VIT W/ FE FUMARATE-FA TAB 27-0.8 MG 1 TABLET: 27-0.8 TAB at 10:21

## 2021-07-02 RX ADMIN — FERROUS SULFATE TAB EC 324 MG (65 MG FE EQUIVALENT) 324 MG: 324 (65 FE) TABLET DELAYED RESPONSE at 20:42

## 2021-07-02 RX ADMIN — ALBUTEROL SULFATE 2 PUFF: 90 AEROSOL, METERED RESPIRATORY (INHALATION) at 13:18

## 2021-07-02 RX ADMIN — DOCUSATE SODIUM 100 MG: 100 CAPSULE, LIQUID FILLED ORAL at 20:42

## 2021-07-02 RX ADMIN — ACETAMINOPHEN 1000 MG: 500 TABLET, FILM COATED ORAL at 23:16

## 2021-07-02 RX ADMIN — DEXTROMETHORPHAN 60 MG: 30 SUSPENSION, EXTENDED RELEASE ORAL at 20:42

## 2021-07-02 RX ADMIN — DEXTROMETHORPHAN 60 MG: 30 SUSPENSION, EXTENDED RELEASE ORAL at 13:17

## 2021-07-02 RX ADMIN — DOCUSATE SODIUM 100 MG: 100 CAPSULE, LIQUID FILLED ORAL at 10:22

## 2021-07-02 NOTE — PLAN OF CARE
Goal Outcome Evaluation:              Outcome Summary: Fundus Firm bleeding light, progressing with breastfeeding continue to assist as needed, pumping when finished with latch or attempt at latch , patient had several episodes of low temp but resolves, pain well controlled

## 2021-07-02 NOTE — PLAN OF CARE
Problem: Adult Inpatient Plan of Care  Goal: Plan of Care Review  Outcome: Ongoing, Progressing  Flowsheets  Taken 7/2/2021 0332 by Loraine Johnson, RN  Progress: no change  Outcome Summary: FF, bleeding light. Working with pt on breastfeeding and educating on the importance of trying to pump if not able to sucessful breastfeed at feeding times. Pt resting well at this time.  Taken 7/1/2021 0724 by Godfrey Bautista RN  Plan of Care Reviewed With:   patient   spouse   Goal Outcome Evaluation:           Progress: no change  Outcome Summary: FF, bleeding light. Working with pt on breastfeeding and educating on the importance of trying to pump if not able to sucessful breastfeed at feeding times. Pt resting well at this time.

## 2021-07-02 NOTE — PROGRESS NOTES
"[facility]  Progress Note - Obstetrics    Name: Yumiko Badillo  MRN: [id number]  Location: L77  Date: 2021  CSN: [id number]       PPD #1 s/p  at 36w6d secondary to IOL for ICP; also COVID+ (diagnosed )    Overnight, had some low temps (lowest/last low temp was 94.1 at 0602) with some difficulty reading on oral or axillary thermometers.  Blood sugar WNL (77).    Patient seen and examined.  No concerns.  States that she feels much better now that she is delivered.  Reports that she only has SOB when she feels like she needs to cough.  Denies headache, dizziness, chest pain, nausea, vomiting.  Minimal/moderate lochia.  OOB and ambulating.  Tolerating regular diet.  Voiding without difficulty.  Breastfeeding.    PHYSICAL EXAM  /60   Pulse 83   Temp 97.2 °F (36.2 °C) (Oral)   Resp 18   Ht 160 cm (63\")   Wt 83.9 kg (185 lb)   LMP 10/16/2020 (Exact Date)   SpO2 97%   Breastfeeding Yes   BMI 32.77 kg/m²   General: AAOx3, no apparent distress.  Lungs: Decreased BS but clear B/L anteriorly, no wheezes, rales, rhonchi.  CV: Acceptable rate, regular rhythm, S1/S2 normal.  Abdomen: +BS, soft, nondistended, uterine fundus firm, nontender, and below umbilicus.  Lower extremities: Mild non-pitting bilateral edema.  2+/4+ dorsalis pedis pulses B/L.    LABS  WBC: 10.7 > 17.5  Hgb: 9.8 > 9.7  Plts: 230 > 313  ALT: 50 > 29  AST: 71 > 38  TSH: 8.03  FT4: 0.96    IMPRESSION  Yumiko Badillo is a 26 y.o.  PPD #1 s/p .  Low temperatures resolved.  Newly diagnosed hypothyroidism.      PLAN  1.  Postpartum s/p   - Continue routine postpartum care.  - Diet: Pregnancy/breastfeeding  - DVT prophylaxis: SCDs  - Breastfeeding  - Birth control options were discussed and patient undecided  - Discharge to home tomorrow.  Will continue to work on breastfeeding and also monitor temperatures.  Follow-up in 2 weeks (telephone visit), 6 weeks with OB/GYN provider.     2.  " ICP  - Repeat LFTs at 6 wk PP visit    3.  COVID+  - Continue azithromycin pack  - Continue PRN Delsym, albuterol neb  - IS added    4.  Low temperatures, resolved  - Discussed w/ Dr. Hooks (Hospitalist).  Will monitor for now.    This document has been electronically signed by Tessa Mendez MD on July 2, 2021 10:35 CDT.

## 2021-07-02 NOTE — NURSING NOTE
Difficulty obtaining pt's temp oral or axillary. Able to obtain a rectal temp. Pt's temperature low even after providing multiple new warm blankets. Pt is clammy; blood glucose checked and is 71. Pt has no complaints of being cold, lightheaded or dizzy. Notified Dr. Mendez of vital signs, low temp and nursing interventions performed. Order to take a repeat in temperature in 1 hour and hourly for two hours after. CMP and CBC with diff ordered.

## 2021-07-03 VITALS
WEIGHT: 185 LBS | TEMPERATURE: 97.5 F | BODY MASS INDEX: 32.78 KG/M2 | HEART RATE: 93 BPM | RESPIRATION RATE: 16 BRPM | DIASTOLIC BLOOD PRESSURE: 70 MMHG | HEIGHT: 63 IN | SYSTOLIC BLOOD PRESSURE: 118 MMHG | OXYGEN SATURATION: 97 %

## 2021-07-03 PROCEDURE — 99238 HOSP IP/OBS DSCHRG MGMT 30/<: CPT | Performed by: OBSTETRICS & GYNECOLOGY

## 2021-07-03 RX ORDER — IBUPROFEN 800 MG/1
800 TABLET ORAL EVERY 8 HOURS
Qty: 42 TABLET | Refills: 2 | Status: SHIPPED | OUTPATIENT
Start: 2021-07-03 | End: 2021-07-17

## 2021-07-03 RX ORDER — ACETAMINOPHEN 500 MG
1000 TABLET ORAL EVERY 6 HOURS
Qty: 112 TABLET | Refills: 2 | Status: SHIPPED | OUTPATIENT
Start: 2021-07-03 | End: 2021-07-17

## 2021-07-03 RX ORDER — LEVOTHYROXINE SODIUM 0.1 MG/1
100 TABLET ORAL DAILY
Qty: 30 TABLET | Refills: 2 | Status: SHIPPED | OUTPATIENT
Start: 2021-07-03 | End: 2021-08-24

## 2021-07-03 RX ORDER — ONDANSETRON 4 MG/1
4 TABLET, FILM COATED ORAL EVERY 6 HOURS PRN
Qty: 30 TABLET | Refills: 2 | Status: SHIPPED | OUTPATIENT
Start: 2021-07-03 | End: 2022-12-11 | Stop reason: SDUPTHER

## 2021-07-03 RX ADMIN — DOCUSATE SODIUM 100 MG: 100 CAPSULE, LIQUID FILLED ORAL at 10:35

## 2021-07-03 RX ADMIN — PRENATAL VIT W/ FE FUMARATE-FA TAB 27-0.8 MG 1 TABLET: 27-0.8 TAB at 10:35

## 2021-07-03 RX ADMIN — AZITHROMYCIN MONOHYDRATE 250 MG: 250 TABLET ORAL at 10:35

## 2021-07-03 RX ADMIN — FERROUS SULFATE TAB EC 324 MG (65 MG FE EQUIVALENT) 324 MG: 324 (65 FE) TABLET DELAYED RESPONSE at 10:35

## 2021-07-03 RX ADMIN — LEVOTHYROXINE SODIUM 100 MCG: 100 TABLET ORAL at 07:00

## 2021-07-03 NOTE — DISCHARGE SUMMARY
[facility]  Yumiko Badillo  : 1995  MRN: [id number]  CSN: [id number]    Discharge Summary:    Date of Admission: 2021  Date of Discharge:  7/3/2021    Admitting Diagnosis:  1. Intrauterine pregnancy @ 36w6d  2 cholestasis of pregnancy  3 Covid virus detected         Discharge Diagnosis:  1. Status post   Same and in addition hypothermia of undetermined clinical significance       History and Hospital Course:  Patient is a   who presents for induction of labor.  Her pregnancy was complicated by Covid positive near delivery and cholestasis of pregnancy.  Please see History and Physical for full details.       She was admitted and progressed in labor to completely dilated. She had a vaginal delivery of a  viable female   infant who weighed 2610 g (5 lb 12.1 oz)  and APGARs of        APGARS  One minute Five minutes Ten minutes Fifteen minutes Twenty minutes   Skin color: 0   1             Heart rate: 2   2             Grimace: 1   2              Muscle tone: 2   2              Breathin   2              Totals: 7   9              . No immediate complications were encountered. Please see procedure note for full details.        She did have some hypothermia postpartum.  This was discussed with medicine in the context of her doing relatively well otherwise its significance was unknown.  Her temperature came up and had been stable in the 97-1/2 range she felt very well was anxious for discharge and will she will call for any problems particularly any shortness of breath hypothermia or any other problems.. She had no signs or symptoms of acute blood loss anemia. She was ambulating well, voiding without difficulty and lochia was within normal limits. She was breast feeding without difficulty. She was stable for discharge on postpartum day 2.      Precautions and instructions were discussed with her including but not limited to maintaining a regular diet at home, practicing local  hygiene, pelvic rest, and signs and symptoms to report including heavy bleeding, frequent passage of clots, fainting or dizziness, foul odor of lochia, increasing pain, fever, or any other concerns.    She was asked to follow up in the office in 4 weeks.    Condition: Stable  Discharge Disposition: home  Discharge Diet:   Diet Instructions     Diet: Regular      Discharge Diet: Regular        Activity at Discharge:   Activity Instructions     Pelvic Rest      Self isolate for total of 10 days from symptoms of Covid and a least 24 hours without fever        Discharge Medications:       Discharge Medications      New Medications      Instructions Start Date   ibuprofen 800 MG tablet  Commonly known as: ADVIL,MOTRIN  Notes to patient: Last dose taken   Next dose due   800 mg, Oral, Every 8 Hours      levothyroxine 100 MCG tablet  Commonly known as: SYNTHROID, LEVOTHROID  Notes to patient: Last dose was taken today at 7 AM  Next dose due tomorrow at 6 AM before breakfast   100 mcg, Oral, Daily      ondansetron 4 MG tablet  Commonly known as: ZOFRAN  Notes to patient: No recent doses. Take as prescribed.   4 mg, Oral, Every 6 Hours PRN         Changes to Medications      Instructions Start Date   acetaminophen 500 MG tablet  Commonly known as: TYLENOL  What changed:   · how much to take  · when to take this  · reasons to take this  Notes to patient: Last dose was taken Yesterday at 11:16 PM.   Next dose may be taken today when needed.   1,000 mg, Oral, Every 6 Hours         Continue These Medications      Instructions Start Date   albuterol sulfate  (90 Base) MCG/ACT inhaler  Commonly known as: PROVENTIL HFA;VENTOLIN HFA;PROAIR HFA  Notes to patient: Last dose taken yesterday at 1:18 PM  May take next dose when needed.   2 puffs, Inhalation, Every 4 Hours PRN      azithromycin 250 MG tablet  Commonly known as: ZITHROMAX  Notes to patient: Last dose taken   Next dose due   Take 2 tabs by mouth day 1 then take 1 tab  by mouth daily x4 days      dextromethorphan polistirex ER 30 MG/5ML Suspension Extended Release oral suspension  Commonly known as: Delsym  Notes to patient: Last dose taken   Next dose due   60 mg, Oral, 2 Times Daily PRN      docusate sodium 100 MG capsule  Commonly known as: Colace  Notes to patient: Last dose taken   Next dose due   100 mg, Oral, 2 Times Daily      famotidine 20 MG tablet  Commonly known as: PEPCID  Notes to patient: No recent doses. Take as prescribed.   TAKE 1 TABLET TWICE DAILY      ferrous sulfate 325 (65 FE) MG tablet  Notes to patient: Last dose taken   Next dose due   325 mg, Oral, Daily With Breakfast      PRE-ROSS PO  Notes to patient: No recent doses. Take as prescribed.   Oral           Patient will restart all home medications including prenatal vitamins.        This document has been electronically signed by Aj Rudolph MD on July 3, 2021 09:52 CDT

## 2021-07-03 NOTE — DISCHARGE INSTR - APPOINTMENTS
[facility] to schedule a 1 week follow up appointment with Dr Soriano. .[phone]

## 2021-07-07 ENCOUNTER — TELEPHONE (OUTPATIENT)
Dept: LACTATION | Facility: HOSPITAL | Age: 26
End: 2021-07-07

## 2021-07-07 LAB
LAB AP CASE REPORT: NORMAL
PATH REPORT.FINAL DX SPEC: NORMAL

## 2021-07-15 ENCOUNTER — OFFICE VISIT (OUTPATIENT)
Dept: OBSTETRICS AND GYNECOLOGY | Facility: CLINIC | Age: 26
End: 2021-07-15

## 2021-07-15 DIAGNOSIS — U07.1 COVID-19 AFFECTING CHILDBIRTH: ICD-10-CM

## 2021-07-15 PROCEDURE — 0503F POSTPARTUM CARE VISIT: CPT | Performed by: OBSTETRICS & GYNECOLOGY

## 2021-07-17 NOTE — PROGRESS NOTES
You have chosen to receive care through a telephone visit. Do you consent to use a telephone visit for your medical care today? Yes    This visit has been rescheduled as a phone visit to comply with patient safety concerns in accordance with CDC recommendations. Total time of discussion was 12 minutes.    Chief complaint postpartum delivery complicated by Covid positivity    Patient is undergoing a self quarantine per health department recommendations.  Says she is doing well no fevers or hypothermia.  Denies cough.  Denies dizziness.  Says her bleeding is within normal limits.  She is going to follow-up for regular visit in about 4 weeks

## 2021-07-26 DIAGNOSIS — D50.9 MATERNAL IRON DEFICIENCY ANEMIA COMPLICATING PREGNANCY IN THIRD TRIMESTER: ICD-10-CM

## 2021-07-26 DIAGNOSIS — O99.013 MATERNAL IRON DEFICIENCY ANEMIA COMPLICATING PREGNANCY IN THIRD TRIMESTER: ICD-10-CM

## 2021-08-02 RX ORDER — DOCUSATE SODIUM 100 MG/1
CAPSULE, LIQUID FILLED ORAL
Qty: 60 CAPSULE | Refills: 0 | Status: SHIPPED | OUTPATIENT
Start: 2021-08-02 | End: 2021-08-24

## 2021-08-02 RX ORDER — FERROUS SULFATE 325(65) MG
TABLET ORAL
Qty: 30 TABLET | Refills: 1 | Status: SHIPPED | OUTPATIENT
Start: 2021-08-02 | End: 2021-09-28 | Stop reason: SDUPTHER

## 2021-08-03 ENCOUNTER — LAB (OUTPATIENT)
Dept: LAB | Facility: HOSPITAL | Age: 26
End: 2021-08-03

## 2021-08-03 ENCOUNTER — POSTPARTUM VISIT (OUTPATIENT)
Dept: OBSTETRICS AND GYNECOLOGY | Facility: CLINIC | Age: 26
End: 2021-08-03

## 2021-08-03 VITALS
HEIGHT: 63 IN | DIASTOLIC BLOOD PRESSURE: 68 MMHG | WEIGHT: 163 LBS | BODY MASS INDEX: 28.88 KG/M2 | SYSTOLIC BLOOD PRESSURE: 110 MMHG

## 2021-08-03 DIAGNOSIS — R79.89 ELEVATED LFTS: Primary | ICD-10-CM

## 2021-08-03 DIAGNOSIS — R79.89 ELEVATED LFTS: ICD-10-CM

## 2021-08-03 LAB — TSH SERPL DL<=0.05 MIU/L-ACNC: 0.05 UIU/ML (ref 0.27–4.2)

## 2021-08-03 PROCEDURE — 36415 COLL VENOUS BLD VENIPUNCTURE: CPT

## 2021-08-03 PROCEDURE — 84443 ASSAY THYROID STIM HORMONE: CPT

## 2021-08-03 PROCEDURE — 0503F POSTPARTUM CARE VISIT: CPT | Performed by: OBSTETRICS & GYNECOLOGY

## 2021-08-03 NOTE — PROGRESS NOTES
"Chief complaint: Postpartum visit    SUBJECTIVE:   Pt is a 26 y.o.  now s/p vaginal delivery approximately 4 weeks ago.. Pt denies fever, abdominal pain, n/v/d/c, VB, Pt currently bottle feeding and taking PNV, ambulating without difficulty, urinating and stooling without complaints and tolerating normal diet. EPDS 0/30, no SI or HI currently.  Patient was started on levothyroxine after delivery for hypothyroidism we will recheck those labs today.  OBJECTIVE:  /68   Ht 160 cm (63\")   Wt 73.9 kg (163 lb)   LMP 10/16/2020 (Exact Date)   Breastfeeding No   BMI 28.87 kg/m²     Review of Systems   Constitutional: Negative for chills, fatigue and fever.   HENT: Negative for sore throat.    Eyes: Negative for visual disturbance.   Respiratory: Negative for cough, shortness of breath and wheezing.    Cardiovascular: Negative for chest pain, palpitations and leg swelling.   Gastrointestinal: Negative for abdominal pain, diarrhea, nausea and vomiting.   Endocrine: Negative for cold intolerance and heat intolerance.   Genitourinary: Negative for dysuria, flank pain, frequency, menstrual problem, pelvic pain, vaginal bleeding, vaginal discharge and vaginal pain.   Skin: Negative for color change and pallor.   Neurological: Negative for syncope, light-headedness and headaches.   Psychiatric/Behavioral: Negative for dysphoric mood and suicidal ideas. The patient is not nervous/anxious.        Physical Exam  Vitals and nursing note reviewed.   Constitutional:       General: She is not in acute distress.     Appearance: Normal appearance. She is well-developed. She is not ill-appearing, toxic-appearing or diaphoretic.   HENT:      Head: Normocephalic.      Mouth/Throat:      Mouth: Mucous membranes are moist.   Neck:      Thyroid: No thyromegaly.   Genitourinary:     Vagina: Normal.   Musculoskeletal:         General: No swelling, tenderness or deformity. Normal range of motion.      Cervical back: Normal range of " motion.   Skin:     General: Skin is warm and dry.      Findings: No erythema.   Neurological:      General: No focal deficit present.      Mental Status: She is alert and oriented to person, place, and time.   Psychiatric:         Mood and Affect: Mood normal.         Behavior: Behavior normal.         Thought Content: Thought content normal.         Judgment: Judgment normal.         ASSESSMENT:    Pt is a 26 y.o.  s/p vaginal delivery doing well and recovering appropriately.  PLAN:   1.  Does not want anything for contraception   2. Pt to continue to increase exercise/daily activities as tolerated   3. Continue prenatal vitamins   4. f/u 2 to 3 weeks, recheck TSH today    Med reconciliation completed.        This document has been electronically signed by Jamar Batista DO on August 3, 2021 14:50 CDT

## 2021-08-05 NOTE — ANESTHESIA POSTPROCEDURE EVALUATION
Patient: Yumiko Lopez    Procedure Summary     Date: 06/30/21 Room / Location:     Anesthesia Start: 1946 Anesthesia Stop: 08/01/21 0336    Procedure: LABOR ANALGESIA Diagnosis:     Scheduled Providers:  Provider: Jeffery Gilbert CRNA    Anesthesia Type: epidural ASA Status: 2 - Emergent          Anesthesia Type: epidural    Vitals  Vitals Value Taken Time   /68 08/03/21 1436   Temp 97.5 °F (36.4 °C) 07/03/21 0857   Pulse 93 07/03/21 0857   Resp 16 07/03/21 0857   SpO2 97 % 07/03/21 0857           Anesthesia Post Evaluation

## 2021-08-24 ENCOUNTER — LAB (OUTPATIENT)
Dept: LAB | Facility: HOSPITAL | Age: 26
End: 2021-08-24

## 2021-08-24 ENCOUNTER — POSTPARTUM VISIT (OUTPATIENT)
Dept: OBSTETRICS AND GYNECOLOGY | Facility: CLINIC | Age: 26
End: 2021-08-24

## 2021-08-24 VITALS
WEIGHT: 171.6 LBS | BODY MASS INDEX: 30.41 KG/M2 | HEIGHT: 63 IN | DIASTOLIC BLOOD PRESSURE: 58 MMHG | SYSTOLIC BLOOD PRESSURE: 110 MMHG

## 2021-08-24 DIAGNOSIS — E03.9 HYPOTHYROIDISM (ACQUIRED): Primary | ICD-10-CM

## 2021-08-24 PROBLEM — O26.892 LOW BACK PAIN DURING PREGNANCY, SECOND TRIMESTER: Status: RESOLVED | Noted: 2021-02-10 | Resolved: 2021-08-24

## 2021-08-24 PROBLEM — K83.1 CHOLESTASIS DURING PREGNANCY: Status: RESOLVED | Noted: 2021-06-30 | Resolved: 2021-08-24

## 2021-08-24 PROBLEM — Z67.91 RH NEGATIVE STATUS DURING PREGNANCY: Status: RESOLVED | Noted: 2020-12-08 | Resolved: 2021-08-24

## 2021-08-24 PROBLEM — M54.50 LOW BACK PAIN DURING PREGNANCY, SECOND TRIMESTER: Status: RESOLVED | Noted: 2021-02-10 | Resolved: 2021-08-24

## 2021-08-24 PROBLEM — O26.649 CHOLESTASIS DURING PREGNANCY: Status: RESOLVED | Noted: 2021-06-30 | Resolved: 2021-08-24

## 2021-08-24 PROBLEM — U07.1 COVID-19 VIRUS DETECTED: Status: RESOLVED | Noted: 2021-07-01 | Resolved: 2021-08-24

## 2021-08-24 PROBLEM — O26.619 CHOLESTASIS DURING PREGNANCY: Status: RESOLVED | Noted: 2021-06-30 | Resolved: 2021-08-24

## 2021-08-24 PROBLEM — O26.899 RH NEGATIVE STATUS DURING PREGNANCY: Status: RESOLVED | Noted: 2020-12-08 | Resolved: 2021-08-24

## 2021-08-24 PROBLEM — O26.13 POOR WEIGHT GAIN OF PREGNANCY, THIRD TRIMESTER: Status: RESOLVED | Noted: 2021-02-10 | Resolved: 2021-08-24

## 2021-08-24 PROBLEM — O09.00 SUPERVISION OF HIGH-RISK PREGNANCY WITH HISTORY OF INFERTILITY: Status: RESOLVED | Noted: 2020-12-08 | Resolved: 2021-08-24

## 2021-08-24 LAB
T4 FREE SERPL-MCNC: 1.48 NG/DL (ref 0.93–1.7)
TSH SERPL DL<=0.05 MIU/L-ACNC: 0.12 UIU/ML (ref 0.27–4.2)

## 2021-08-24 PROCEDURE — 0503F POSTPARTUM CARE VISIT: CPT | Performed by: OBSTETRICS & GYNECOLOGY

## 2021-08-24 PROCEDURE — 36415 COLL VENOUS BLD VENIPUNCTURE: CPT | Performed by: OBSTETRICS & GYNECOLOGY

## 2021-08-24 PROCEDURE — 84439 ASSAY OF FREE THYROXINE: CPT | Performed by: OBSTETRICS & GYNECOLOGY

## 2021-08-24 PROCEDURE — 84443 ASSAY THYROID STIM HORMONE: CPT | Performed by: OBSTETRICS & GYNECOLOGY

## 2021-08-24 RX ORDER — LEVOTHYROXINE SODIUM 0.03 MG/1
25 TABLET ORAL DAILY
Qty: 60 TABLET | Refills: 1 | Status: SHIPPED | OUTPATIENT
Start: 2021-08-24 | End: 2022-01-03

## 2021-08-24 NOTE — PROGRESS NOTES
"Chief complaint: Postpartum visit    SUBJECTIVE:   Pt is a 26 y.o.  now s/p vaginal delivery approximately 6 weeks ago. Pt denies fever, abdominal pain, n/v/d/c, VB, Pt currently breast feeding and taking PNV, ambulating without difficulty, urinating and stooling without complaints and tolerating normal diet.  Denies any depression at this time.  Thyroid labs and had decreased thyroid medication in half.  We will have patient repeat labs today recommended that she follow-up with her primary care.  OBJECTIVE:  /58   Ht 160 cm (63\")   Wt 77.8 kg (171 lb 9.6 oz)   LMP 10/16/2020 (Exact Date)   BMI 30.40 kg/m²     Review of Systems   Constitutional: Negative for chills, fatigue and fever.   HENT: Negative for sore throat.    Eyes: Negative for visual disturbance.   Respiratory: Negative for cough, shortness of breath and wheezing.    Cardiovascular: Negative for chest pain, palpitations and leg swelling.   Gastrointestinal: Negative for abdominal pain, diarrhea, nausea and vomiting.   Endocrine: Negative for cold intolerance and heat intolerance.   Genitourinary: Negative for dysuria, flank pain, frequency, menstrual problem, pelvic pain, vaginal bleeding, vaginal discharge and vaginal pain.   Skin: Negative for color change and pallor.   Neurological: Negative for syncope, light-headedness and headaches.   Psychiatric/Behavioral: Negative for dysphoric mood and suicidal ideas. The patient is not nervous/anxious.        Physical Exam  Vitals and nursing note reviewed.   Constitutional:       General: She is not in acute distress.     Appearance: Normal appearance. She is well-developed. She is not ill-appearing, toxic-appearing or diaphoretic.   HENT:      Head: Normocephalic.      Mouth/Throat:      Mouth: Mucous membranes are moist.   Neck:      Thyroid: No thyromegaly.   Genitourinary:     Vagina: Normal.   Musculoskeletal:         General: No swelling, tenderness or deformity. Normal range of " motion.      Cervical back: Normal range of motion.   Skin:     General: Skin is warm and dry.      Findings: No erythema.   Neurological:      General: No focal deficit present.      Mental Status: She is alert and oriented to person, place, and time.   Psychiatric:         Mood and Affect: Mood normal.         Behavior: Behavior normal.         Thought Content: Thought content normal.         Judgment: Judgment normal.         ASSESSMENT:    Pt is a 26 y.o.  s/p vaginal delivery doing well recovering appropriately, hypothyroidism however recently decreased medication will recheck thyroid levels today  PLAN:   1.  Does not want anything for contraception   2. Pt to continue to increase exercise/daily activities as tolerated   3. Continue prenatal vitamins   4. f/u 4 weeks for Pap smear and follow-up on thyroid labs.    Med reconciliation completed.        This document has been electronically signed by Jamar Batista DO on 2021 14:08 CDT

## 2021-09-01 DIAGNOSIS — E03.9 HYPOTHYROIDISM (ACQUIRED): Primary | ICD-10-CM

## 2021-09-28 DIAGNOSIS — D50.9 MATERNAL IRON DEFICIENCY ANEMIA COMPLICATING PREGNANCY IN THIRD TRIMESTER: ICD-10-CM

## 2021-09-28 DIAGNOSIS — O99.013 MATERNAL IRON DEFICIENCY ANEMIA COMPLICATING PREGNANCY IN THIRD TRIMESTER: ICD-10-CM

## 2021-09-28 RX ORDER — FERROUS SULFATE 325(65) MG
1 TABLET ORAL
Qty: 30 TABLET | Refills: 1 | Status: SHIPPED | OUTPATIENT
Start: 2021-09-28 | End: 2022-03-23

## 2021-12-01 ENCOUNTER — LAB (OUTPATIENT)
Dept: LAB | Facility: HOSPITAL | Age: 26
End: 2021-12-01

## 2021-12-01 ENCOUNTER — OFFICE VISIT (OUTPATIENT)
Dept: FAMILY MEDICINE CLINIC | Facility: CLINIC | Age: 26
End: 2021-12-01

## 2021-12-01 VITALS
DIASTOLIC BLOOD PRESSURE: 76 MMHG | WEIGHT: 179.6 LBS | OXYGEN SATURATION: 98 % | TEMPERATURE: 97.8 F | SYSTOLIC BLOOD PRESSURE: 120 MMHG | HEART RATE: 81 BPM | BODY MASS INDEX: 31.82 KG/M2 | HEIGHT: 63 IN

## 2021-12-01 DIAGNOSIS — M75.41 IMPINGEMENT SYNDROME OF RIGHT SHOULDER: Primary | ICD-10-CM

## 2021-12-01 DIAGNOSIS — E03.9 HYPOTHYROIDISM, UNSPECIFIED TYPE: ICD-10-CM

## 2021-12-01 PROCEDURE — 84443 ASSAY THYROID STIM HORMONE: CPT

## 2021-12-01 PROCEDURE — 36415 COLL VENOUS BLD VENIPUNCTURE: CPT

## 2021-12-01 PROCEDURE — 96372 THER/PROPH/DIAG INJ SC/IM: CPT | Performed by: STUDENT IN AN ORGANIZED HEALTH CARE EDUCATION/TRAINING PROGRAM

## 2021-12-01 PROCEDURE — 99213 OFFICE O/P EST LOW 20 MIN: CPT | Performed by: STUDENT IN AN ORGANIZED HEALTH CARE EDUCATION/TRAINING PROGRAM

## 2021-12-01 PROCEDURE — 84439 ASSAY OF FREE THYROXINE: CPT

## 2021-12-01 RX ORDER — TRIAMCINOLONE ACETONIDE 40 MG/ML
80 INJECTION, SUSPENSION INTRA-ARTICULAR; INTRAMUSCULAR ONCE
Status: COMPLETED | OUTPATIENT
Start: 2021-12-01 | End: 2021-12-01

## 2021-12-01 RX ADMIN — TRIAMCINOLONE ACETONIDE 80 MG: 40 INJECTION, SUSPENSION INTRA-ARTICULAR; INTRAMUSCULAR at 15:07

## 2021-12-01 NOTE — PROGRESS NOTES
Family Medicine Residency  Roberto Reddy MD    Subjective:     Yumiko Lopez is a 26 y.o. female who presents for right shoulder pain.  She has pain raising her arm above her shoulder.  Feels that she may have some weakness likely related to pain.  Normal sensation.  No known injury to the area.  She had similar symptoms on the left side a couple years ago that resolved on their own.  Patient had a child 5 months ago.  She switches caring her with both arms.    During her pregnancy patient was found to have elevated TSH and low T4.  No hair loss, cold intolerance, weight gain, decreased energy currently.    The following portions of the patient's history were reviewed and updated as appropriate: allergies, current medications, past family history, past medical history, past social history, past surgical history and problem list.    Past Medical Hx:  Past Medical History:   Diagnosis Date   • Anxiety    • Personal history of cardiac arrhythmia    • SVT (supraventricular tachycardia) (HCC)     s/p ablation 2015       Past Surgical Hx:  Past Surgical History:   Procedure Laterality Date   • ANKLE SURGERY  2010   • APPENDECTOMY  2012   • CARDIAC ABLATION  2015    for SVT       Current Meds:    Current Outpatient Medications:   •  Diclofenac Sodium (VOLTAREN) 1 % gel gel, APPLY 4GM TO THE APPOPRIATE AREA THREE TIMES DAILY AS DIRECTED, Disp: , Rfl:   •  famotidine (PEPCID) 20 MG tablet, TAKE 1 TABLET TWICE DAILY, Disp: 60 tablet, Rfl: 5  •  ferrous sulfate (FeroSul) 325 (65 FE) MG tablet, Take 1 tablet by mouth Daily With Breakfast., Disp: 30 tablet, Rfl: 1  •  levothyroxine (SYNTHROID, LEVOTHROID) 25 MCG tablet, Take 1 tablet by mouth Daily., Disp: 60 tablet, Rfl: 1  •  ondansetron (ZOFRAN) 4 MG tablet, Take 1 tablet by mouth Every 6 (Six) Hours As Needed for Nausea or Vomiting., Disp: 30 tablet, Rfl: 2  •  Prenatal Multivit-Min-Fe-FA (PRE- PO), Take  by mouth., Disp: , Rfl:   •   "promethazine-dextromethorphan (PROMETHAZINE-DM) 6.25-15 MG/5ML syrup, Take 5 mL by mouth Every 6 (Six) Hours As Needed for Cough., Disp: 120 mL, Rfl: 0  •  pseudoephedrine (SUDAFED) 30 MG tablet, Take 1-2 tablets by mouth every 6 hrs prn congestion., Disp: 30 tablet, Rfl: 0    Allergies:  Allergies   Allergen Reactions   • Tamiflu [Oseltamivir Phosphate] Rash   • Vancomycin Itching       Family Hx:  Family History   Problem Relation Age of Onset   • Depression Mother    • Hypertension Father    • No Known Problems Brother    • No Known Problems Sister    • Uterine cancer Paternal Grandmother    • No Known Problems Maternal Grandmother    • Colon cancer Maternal Grandfather         Social History:  Social History     Socioeconomic History   • Marital status:    Tobacco Use   • Smoking status: Never Smoker   • Smokeless tobacco: Never Used   Vaping Use   • Vaping Use: Never used   Substance and Sexual Activity   • Alcohol use: No   • Drug use: No   • Sexual activity: Yes     Partners: Male     Comment: last pap smear 9/27/18 negative        Review of Systems  Review of Systems   Constitutional: Negative for chills, fatigue and fever.   HENT: Negative for congestion, postnasal drip and rhinorrhea.    Eyes: Negative for pain and visual disturbance.   Respiratory: Negative for cough and shortness of breath.    Cardiovascular: Negative for chest pain, palpitations and leg swelling.   Gastrointestinal: Negative for abdominal pain, diarrhea, nausea and vomiting.   Genitourinary: Negative for difficulty urinating, dysuria and hematuria.   Musculoskeletal: Positive for arthralgias and neck pain. Negative for back pain.   Neurological: Negative for dizziness, syncope, weakness, light-headedness and headaches.       Objective:     /76   Pulse 81   Temp 97.8 °F (36.6 °C)   Ht 160 cm (63\")   Wt 81.5 kg (179 lb 9.6 oz)   SpO2 98%   BMI 31.81 kg/m²   Physical Exam  Vitals reviewed.   Constitutional:       " Appearance: She is well-developed. She is not diaphoretic.   HENT:      Head: Normocephalic and atraumatic.      Mouth/Throat:      Pharynx: No oropharyngeal exudate.   Eyes:      General: No scleral icterus.     Conjunctiva/sclera: Conjunctivae normal.      Pupils: Pupils are equal, round, and reactive to light.   Cardiovascular:      Rate and Rhythm: Normal rate and regular rhythm.      Heart sounds: No murmur heard.      Pulmonary:      Effort: Pulmonary effort is normal. No respiratory distress.      Breath sounds: Normal breath sounds.   Abdominal:      General: Bowel sounds are normal. There is no distension.      Palpations: Abdomen is soft.      Tenderness: There is no abdominal tenderness. Negative signs include Perez's sign.   Musculoskeletal:         General: Tenderness (right shoulder) present. No deformity.      Right shoulder: Tenderness present. No swelling. Decreased range of motion (pain with active and passive ROM).      Right lower leg: No edema.      Left lower leg: No edema.   Skin:     General: Skin is warm and dry.   Neurological:      Mental Status: She is alert and oriented to person, place, and time.   Psychiatric:         Behavior: Behavior normal.          Assessment/Plan:     Diagnoses and all orders for this visit:    1. Impingement syndrome of right shoulder (Primary)  -     triamcinolone acetonide (KENALOG-40) injection 80 mg  -     XR Shoulder 2+ View Right; Future    2. Hypothyroidism, unspecified type  -     TSH+Free T4; Future      · Rx changes: as above  · Patient Education: impingement syndrome basics  · Compliance at present is estimated to be good.   · Efforts to improve compliance (if necessary) will be directed at rest of shoulder very short term and gradual increase .    Depression screening: Up to date; last screen 12/1/2021     Follow-up:     No follow-ups on file.    Preventative:  Health Maintenance   Topic Date Due   • ANNUAL PHYSICAL  Never done   • INFLUENZA VACCINE   08/01/2021   • COVID-19 Vaccine (2 - Moderna 3-dose series) 08/31/2021   • PAP SMEAR  09/27/2021   • TDAP/TD VACCINES (3 - Td or Tdap) 05/05/2031   • HEPATITIS C SCREENING  Completed   • HPV VACCINES  Completed   • Pneumococcal Vaccine 0-64  Aged Out       Weight  -Class: Obese Class I: 30-34.9kg/m2  -Patient's Body mass index is 31.81 kg/m². indicating that she is obese (BMI >30). Obesity-related health conditions include the following: GERD. Obesity is unchanged. BMI is is above average; BMI management plan is completed. We discussed portion control and increasing exercise..   decrease soda or juice intake, increase water intake and increase physical activity    Alcohol use:  reports no history of alcohol use.  Nicotine status  reports that she has never smoked. She has never used smokeless tobacco.    Goals     •  Reduce fat intake/eat healthier (pt-stated)       Barriers:  Likes [employer], but will try to eat it less frequently, e.g. Once every 2 weeks instead of weekly            RISK SCORE: 2       Roberto Reddy MD   PGY-2    [facility]  [address] [place] [address]  Office: [phone]    This document has been electronically signed by Roberto Reddy MD on December 26, 2021 12:32 CST

## 2021-12-02 LAB
T4 FREE SERPL-MCNC: 1.37 NG/DL (ref 0.93–1.7)
TSH SERPL DL<=0.05 MIU/L-ACNC: 2.36 UIU/ML (ref 0.27–4.2)

## 2021-12-27 NOTE — PROGRESS NOTES
I have reviewed the patient.  I have reviewed the notes, assessments, and/or procedures performed by Dr Roberto Reddy, I concur with his  documentation and assessment and plan for Yumiko Lopez.          This document has been electronically signed by Av Holman MD on December 27, 2021 11:37 CST

## 2021-12-30 ENCOUNTER — OFFICE VISIT (OUTPATIENT)
Dept: FAMILY MEDICINE CLINIC | Facility: CLINIC | Age: 26
End: 2021-12-30

## 2021-12-30 VITALS
TEMPERATURE: 97.3 F | WEIGHT: 183.9 LBS | BODY MASS INDEX: 32.59 KG/M2 | DIASTOLIC BLOOD PRESSURE: 78 MMHG | HEART RATE: 91 BPM | HEIGHT: 63 IN | OXYGEN SATURATION: 98 % | SYSTOLIC BLOOD PRESSURE: 120 MMHG

## 2021-12-30 DIAGNOSIS — M75.41 SHOULDER IMPINGEMENT SYNDROME, RIGHT: Primary | ICD-10-CM

## 2021-12-30 PROCEDURE — 99213 OFFICE O/P EST LOW 20 MIN: CPT | Performed by: STUDENT IN AN ORGANIZED HEALTH CARE EDUCATION/TRAINING PROGRAM

## 2021-12-30 RX ORDER — MELOXICAM 7.5 MG/1
7.5 TABLET ORAL DAILY
Qty: 30 TABLET | Refills: 1 | Status: SHIPPED | OUTPATIENT
Start: 2021-12-30 | End: 2022-03-23

## 2021-12-30 NOTE — PROGRESS NOTES
Family Medicine Residency  Mikie Kc MD    Subjective:     Yumiko Lopez is a 26 y.o. female who presents for follow-up for recently diagnosed left shoulder impingement.    Patient was last seen around a month ago by Dr. Reddy for acute flare of chronic shoulder pain.  Although patient does not have history of acute injury, she does have history of being a  with potential overuse injury in her youth.  Was given diagnosis of shoulder impingement at that time and given Kenalog injection.  X-ray was obtained at that encounter that did not show any notable pathology.    Patient returns today for reevaluation. She states her symptoms have not improved.    She is private insurance.    The following portions of the patient's history were reviewed and updated as appropriate: allergies, current medications, past family history, past medical history, past social history, past surgical history and problem list.    Past Medical Hx:  Past Medical History:   Diagnosis Date   • Anxiety    • Personal history of cardiac arrhythmia    • SVT (supraventricular tachycardia) (HCC)     s/p ablation 2015       Past Surgical Hx:  Past Surgical History:   Procedure Laterality Date   • ANKLE SURGERY  2010   • APPENDECTOMY  2012   • CARDIAC ABLATION  2015    for SVT       Current Meds:    Current Outpatient Medications:   •  famotidine (PEPCID) 20 MG tablet, TAKE 1 TABLET TWICE DAILY, Disp: 60 tablet, Rfl: 5  •  ferrous sulfate (FeroSul) 325 (65 FE) MG tablet, Take 1 tablet by mouth Daily With Breakfast., Disp: 30 tablet, Rfl: 1  •  levothyroxine (SYNTHROID, LEVOTHROID) 25 MCG tablet, Take 1 tablet by mouth Daily., Disp: 60 tablet, Rfl: 1  •  ondansetron (ZOFRAN) 4 MG tablet, Take 1 tablet by mouth Every 6 (Six) Hours As Needed for Nausea or Vomiting., Disp: 30 tablet, Rfl: 2  •  Prenatal Multivit-Min-Fe-FA (PRE-ROSS PO), Take  by mouth., Disp: , Rfl:   •  promethazine-dextromethorphan  "(PROMETHAZINE-DM) 6.25-15 MG/5ML syrup, Take 5 mL by mouth Every 6 (Six) Hours As Needed for Cough., Disp: 120 mL, Rfl: 0  •  pseudoephedrine (SUDAFED) 30 MG tablet, Take 1-2 tablets by mouth every 6 hrs prn congestion., Disp: 30 tablet, Rfl: 0  •  meloxicam (Mobic) 7.5 MG tablet, Take 1 tablet by mouth Daily., Disp: 30 tablet, Rfl: 1    Allergies:  Allergies   Allergen Reactions   • Tamiflu [Oseltamivir Phosphate] Rash   • Vancomycin Itching       Family Hx:  Family History   Problem Relation Age of Onset   • Depression Mother    • Hypertension Father    • No Known Problems Brother    • No Known Problems Sister    • Uterine cancer Paternal Grandmother    • No Known Problems Maternal Grandmother    • Colon cancer Maternal Grandfather         Social History:  Social History     Socioeconomic History   • Marital status:    Tobacco Use   • Smoking status: Never Smoker   • Smokeless tobacco: Never Used   Vaping Use   • Vaping Use: Never used   Substance and Sexual Activity   • Alcohol use: No   • Drug use: No   • Sexual activity: Yes     Partners: Male     Comment: last pap smear 9/27/18 negative        Review of Systems  Review of Systems   Musculoskeletal:        Left shoulder pain        Objective:     /78   Pulse 91   Temp 97.3 °F (36.3 °C)   Ht 160 cm (63\")   Wt 83.4 kg (183 lb 14.4 oz)   LMP 12/19/2021 (Exact Date)   SpO2 98%   BMI 32.58 kg/m²   Physical Exam  Constitutional:       General: She is not in acute distress.     Appearance: Normal appearance. She is not ill-appearing, toxic-appearing or diaphoretic.   Musculoskeletal:      Comments: Passive and active left shoulder pain with elevation of left shoulder. (Active worse than passive.)    Neurological:      Mental Status: She is alert.          Assessment/Plan:     Diagnoses and all orders for this visit:    1. Shoulder impingement syndrome, right (Primary)    joel Lopez is a 26 y.o. female who presents for follow-up for recently " diagnosed left shoulder impingement.    Patient was last seen around a month ago by Dr. Reddy for acute flare of chronic shoulder pain.  Was given diagnosis of shoulder impingement at that time and given Kenalog injection.  X-ray was obtained at that encounter that did not show any notable pathology.    Patient returns today for reevaluation. She states her symptoms have not improved.    We'll provide patient with referral to physical therapy and start NSAIDs at this time for anti-inflammatory and pain properties.  We'll see patient back in 2 months.  If she fails to improve at that time, she may require MRI to evaluate possible rotator cuff versus labral tear.    -     Ambulatory Referral to Physical Therapy  -     meloxicam (Mobic) 7.5 MG tablet; Take 1 tablet by mouth Daily.  Dispense: 30 tablet; Refill: 1        · Rx changes: meloxicam   · Patient Education: Discussed diagnosis     Follow-up:     Return in about 2 months (around 2/28/2022).    Preventative:  Health Maintenance   Topic Date Due   • ANNUAL PHYSICAL  Never done   • INFLUENZA VACCINE  08/01/2021   • COVID-19 Vaccine (2 - Moderna 3-dose series) 08/31/2021   • PAP SMEAR  09/27/2021   • TDAP/TD VACCINES (3 - Td or Tdap) 05/05/2031   • HEPATITIS C SCREENING  Completed   • HPV VACCINES  Completed   • Pneumococcal Vaccine 0-64  Aged Out     Alcohol use:  reports no history of alcohol use.  Nicotine status  reports that she has never smoked. She has never used smokeless tobacco.    Goals     •  Reduce fat intake/eat healthier (pt-stated)       Barriers:  Likes [employer], but will try to eat it less frequently, e.g. Once every 2 weeks instead of weekly            RISK SCORE: 4      This document has been electronically signed by Mikie Kc MD on December 30, 2021 14:36 CST

## 2022-01-03 RX ORDER — LEVOTHYROXINE SODIUM 0.03 MG/1
TABLET ORAL
Qty: 60 TABLET | Refills: 0 | Status: SHIPPED | OUTPATIENT
Start: 2022-01-03 | End: 2022-03-08 | Stop reason: SDUPTHER

## 2022-01-12 ENCOUNTER — HOSPITAL ENCOUNTER (OUTPATIENT)
Dept: PHYSICAL THERAPY | Facility: HOSPITAL | Age: 27
Setting detail: THERAPIES SERIES
Discharge: HOME | End: 2022-01-12

## 2022-01-12 DIAGNOSIS — M75.41 SHOULDER IMPINGEMENT SYNDROME, RIGHT: Primary | ICD-10-CM

## 2022-01-12 PROCEDURE — 97161 PT EVAL LOW COMPLEX 20 MIN: CPT

## 2022-01-12 NOTE — THERAPY EVALUATION
Outpatient Physical Therapy Ortho Initial Evaluation  [facility]     Patient Name: Yumiko Lopez  : 1995  MRN: [id number]  Today's Date: 2022      Visit Date: 2022     ATTENDANCE:   SUBJECTIVE IMPROVEMENT: not assessed at initial evaluation  NEXT MD APPOINTMENT: PRN  RECERT DATE: 22    THERAPY DIAGNOSIS: R shoulder pain       Patient Active Problem List   Diagnosis   • Anxiety   • Arrhythmia   • Chest pain   • ALEJANDRO (dyspnea on exertion)   • Palpitations   • PAT (paroxysmal atrial tachycardia) ([facility])   • SVT (supraventricular tachycardia) ([facility])        Past Medical History:   Diagnosis Date   • Anxiety    • Personal history of cardiac arrhythmia    • SVT (supraventricular tachycardia) ([facility])     s/p ablation 2015        Past Surgical History:   Procedure Laterality Date   • ANKLE SURGERY  2010   • APPENDECTOMY  2012   • CARDIAC ABLATION  2015    for SVT       Visit Dx:     ICD-10-CM ICD-9-CM   1. Shoulder impingement syndrome, right  M75.41 726.2          Patient History     Row Name 22 1100             History    Chief Complaint Pain  -AC      Type of Pain Shoulder pain  -AC      Brief Description of Current Complaint Pt notes that shoulder has been hurting for 1-1.5 years however she was pregnant so she did not do anything about the shoulder, notes since having daughter and with rocking to sleep she is having increased shoulder pain. daughter is currently 6 mo. Notes that anything overhead does increase pain. Notes she was athletic when she was young and had many other injuries however nothing to the shoulder per pr memory. Did have steroid injection in her hip which did not seem to help. Pt was rx Mobic for pain which does help some.  -AC      Patient/Caregiver Goals Relieve pain; Return to prior level of function  -AC      Current Tobacco Use no  -AC      Patient's Rating of General Health Good  -AC      Hand Dominance right-handed  -AC       Occupation/sports/leisure activities pharmacy tech.  -AC      What clinical tests have you had for this problem? X-ray  -AC      Results of Clinical Tests normal shoulder  -AC              Pain     Pain Location Shoulder  -AC      Pain at Present 2  -AC      Pain at Best 2  -AC      Pain at Worst 8  -AC      Pain Frequency Constant/continuous  -AC      Pain Description Aching  -AC      What Performance Factors Make the Current Problem(s) WORSE? overhead reaching and putting daughter down into bassinet.  -AC      What Performance Factors Make the Current Problem(s) BETTER? pain medication, rest, heat minimally helps.  -AC      Is your sleep disturbed? Yes  -AC      Difficulties at work? yes- overhead reaching for medications is difficult.  -AC      Difficulties with ADL's? difficulty/pain with upper body dressing.  -AC              Services    Prior Rehab/Home Health Experiences No  -AC              Daily Activities    Primary Language English  -            User Key  (r) = Recorded By, (t) = Taken By, (c) = Cosigned By    Initials Name Provider Type    AC Anitha Gonzalez, PT Physical Therapist                 PT Ortho     Row Name 01/12/22 1100       Subjective Comments    Subjective Comments see pt hx.  -AC       Precautions and Contraindications    Precautions/Limitations no known precautions/limitations  -AC       Subjective Pain    Able to rate subjective pain? yes  -AC    Pre-Treatment Pain Level 2  -AC       Posture/Observations    Posture/Observations Comments forward head and rounded shoulders with R shoulder depressed compared to L.  -AC       Special Tests/Palpation    Special Tests/Palpation Shoulder  -AC       Shoulder Girdle Accessory Motions    Shoulder Girdle Accessory Motions Tested? Yes  -AC       Shoulder Impingement/Rotator Cuff Special Tests    Steele-Jesús Test (RC Lesion vs. Bursitis) Right:; Negative  -AC    Neer Impingement Test (RC Lesion vs. Bursitis) Right:; Negative  -AC    Full  Can Test (RC Lesion) Right:; Positive  -AC    Empty Can Test (RC Lesion) Right:; Negative  -AC    Drop Arm Test (Full Thickness RC Lesion) Right:; Negative  -AC    Lift-Off Test (Subscapularis Lesion) Right:; Positive  -AC    Belly Press (Subscapularis Lesion) Right:; Positive  -AC       Shoulder Girdle Palpation    Shoulder Girdle Palpation? Yes  -AC    Subscapularis Tender  -AC    Infraspinatus Tender  -AC    Teres Minor Tender  -AC    Upper Trap Tender; Guarded/taut  -AC    Levator Scapula Tender; Guarded/taut  -AC       General ROM    RT Upper Ext Rt Shoulder Flexion; Rt Shoulder External Rotation; Rt Shoulder Internal Rotation; Rt Shoulder Extension; Rt Shoulder ABduction  -AC       Right Upper Ext    Rt Shoulder Abduction AROM 142  -AC    Rt Shoulder Extension AROM 52  -AC    Rt Shoulder Flexion AROM 134  -AC    Rt Shoulder External Rotation AROM 69  -AC    Rt Shoulder Internal Rotation AROM 74  -AC    Rt Upper Extremity Comments  pain with flexion/abduction end range  -AC       MMT (Manual Muscle Testing)    Rt Upper Ext Rt Shoulder Flexion; Rt Shoulder Extension; Rt Shoulder ABduction; Rt Shoulder ADduction; Rt Shoulder Internal Rotation; Rt Shoulder External Rotation  -AC       MMT Right Upper Ext    Rt Shoulder Flexion MMT, Gross Movement (4+/5) good plus  -AC    Rt Shoulder Extension MMT, Gross Movement (5/5) normal  -AC    Rt Shoulder ABduction MMT, Gross Movement (4-/5) good minus  -AC    Rt Shoulder ADduction MMT, Gross Movement (4/5) good  -AC    Rt Shoulder Internal Rotation MMT, Gross Movement (4/5) good  -AC    Rt Shoulder External Rotation MMT, Gross Movement (4/5) good  -AC    Rt Upper Extremity Comments  pain with abduction only.  -AC        Strength Right    # Reps 3  -AC    Right Rung 2  -AC    Right  Test 1 70  -AC    Right  Test 2 70  -AC    Right  Test 3 60  -AC     Strength Average Right 66.67  -AC        Strength Left    # Reps 3  -AC    Left Rung 2  -AC    Left   Test 1 60  -AC    Left  Test 2 50  -AC    Left  Test 3 45  -AC     Strength Average Left 51.67  -AC       Sensation    Additional Comments no report of numbness/tinging in BUEs.  -AC       Hand  Strength     Strength Affected Side Bilateral  -          User Key  (r) = Recorded By, (t) = Taken By, (c) = Cosigned By    Initials Name Provider Type    AC Anitha Gonzalez PT Physical Therapist                            Therapy Education  Education Details: ROM/stretching- pulley's flexion/abduction, doorway pec and ER stretch. IR stretch with strap  Given: HEP  Program: New  How Provided: Verbal, Demonstration, Written  Provided to: Patient  Level of Understanding: Verbalized, Demonstrated      PT OP Goals     Row Name 01/12/22 1200          PT Short Term Goals    STG Date to Achieve 02/09/22  -     STG 1 Pt is independent with HEP.  -     STG 1 Progress New  -     STG 2 Pt has R shoulder AROM WNLs with minimal pain increase.  -     STG 2 Progress New  -            Long Term Goals    LTG Date to Achieve 02/23/22  -     LTG 1 R shoulder MMT improved to 4+/5 or better in all planes.  -AC     LTG 1 Progress New  -     LTG 2 Pt is able to complete ADLs and work activities without increased R shoulder pain.  -AC     LTG 2 Progress New  -     LTG 3 Pt is able to care for daughter without increased R shoulder pain.  -AC     LTG 3 Progress New  -     LTG 4 Pt demo's no posterior shoulder or UT tension/guarding.  -     LTG 4 Progress New  -     LTG 5 QuickDASH decreased to </= 25.  -     LTG 5 Progress New  -            Time Calculation    PT Goal Re-Cert Due Date 02/02/22  -           User Key  (r) = Recorded By, (t) = Taken By, (c) = Cosigned By    Initials Name Provider Type    AC Anitha Gonzalez, PT Physical Therapist                 PT Assessment/Plan     Row Name 01/12/22 1200          PT Assessment    Functional Limitations Limitation in home management;  Limitations in functional capacity and performance; Performance in self-care ADL; Performance in work activities  -AC     Impairments Impaired flexibility; Muscle strength; Pain; Posture  -AC     Assessment Comments The pt is a 27 y/o female who presents with c/o R shoulder pain. she has palpation tenderness to posterior shoulder along RTC insertion and posterior capsule. She has decreased AROM in all planes however P/AAROM WNLs. She has decreased R shoulder flexion, abduction, and IR/ER strength noted today.  strength WNLs. She demo's positive special testing for RTC tendonitis. She will benefit from skilled PT to improve Shoulder mobility and strength and posture to decrease pain and return to PLOF and caretaker/work activities without pain.  -AC     Rehab Potential Good  -AC     Patient/caregiver participated in establishment of treatment plan and goals Yes  -AC     Patient would benefit from skilled therapy intervention Yes  -AC            PT Plan    PT Frequency 2x/week  -AC     Predicted Duration of Therapy Intervention (PT) 4-6 weeks  -AC     PT Plan Comments UE ROM/stretching and gentle progressive strength as tolerated. postural re-education and scapular strengthening. manual/modalities for muscle tension and pain control as needed.  -AC           User Key  (r) = Recorded By, (t) = Taken By, (c) = Cosigned By    Initials Name Provider Type    Anitha Wong, PT Physical Therapist                                    Outcome Measure Options: Quick DASH  Quick DASH  Open a tight or new jar.: Moderate Difficulty  Do heavy household chores (e.g., wash walls, wash floors): Severe Difficulty  Carry a shopping bag or briefcase: Moderate Difficulty  Wash your back: Severe Difficulty  Use a knife to cut food: No Difficulty  Recreational activities in which you take some force or impact through your arm, should or hand (e.g. golf, hammering, tennis, etc.): Moderate Difficulty  During the past week, to what  extent has your arm, shoulder, or hand problem interfered with your normal social activites with family, friends, neighbors or groups?: Slightly  During the past week, were you limited in your work or other regular daily activities as a result of your arm, shoulder or hand problem?: Slightly Limited  Arm, Shoulder, or hand pain: Moderate  Tingling (pins and needles) in your arm, shoulder, or hand: None  During the past week, how much difficulty have you had sleeping because of the pain in your arm, shoulder or hand?: Moderate Difficiculty  Number of Questions Answered: 11  Quick DASH Score: 40.91         Time Calculation:     Start Time: 1105  Stop Time: 1140  Time Calculation (min): 35 min  Untimed Charges  PT Eval/Re-eval Minutes: 35  Total Minutes  Untimed Charges Total Minutes: 35   Total Minutes: 35     Therapy Charges for Today     Code Description Service Date Service Provider Modifiers Qty    [id number] HC PT EVAL LOW COMPLEXITY 3 1/12/2022 Anitha Gonzalez, PT GP 1                   Anitha Gonzalez, PT  1/12/2022

## 2022-01-17 ENCOUNTER — HOSPITAL ENCOUNTER (OUTPATIENT)
Dept: PHYSICAL THERAPY | Facility: HOSPITAL | Age: 27
Setting detail: THERAPIES SERIES
Discharge: HOME | End: 2022-01-17

## 2022-01-17 DIAGNOSIS — M75.41 SHOULDER IMPINGEMENT SYNDROME, RIGHT: Primary | ICD-10-CM

## 2022-01-17 PROCEDURE — 97110 THERAPEUTIC EXERCISES: CPT

## 2022-01-17 NOTE — THERAPY TREATMENT NOTE
Outpatient Physical Therapy Ortho Treatment Note  [facility]     Patient Name: Yumiko Lopez  : 1995  MRN: [id number]  Today's Date: 2022      Visit Date: 2022  Subjective Improvement: n/a  MD visit: PRN  Visit Number:   Total Approved:25 a year   Recert Date: 2022  Visit Dx:    ICD-10-CM ICD-9-CM   1. Shoulder impingement syndrome, right  M75.41 726.2       Patient Active Problem List   Diagnosis   • Anxiety   • Arrhythmia   • Chest pain   • ALEJANDRO (dyspnea on exertion)   • Palpitations   • PAT (paroxysmal atrial tachycardia) (HCC)   • SVT (supraventricular tachycardia) ([facility])        Past Medical History:   Diagnosis Date   • Anxiety    • Personal history of cardiac arrhythmia    • SVT (supraventricular tachycardia) ([facility])     s/p ablation 2015        Past Surgical History:   Procedure Laterality Date   • ANKLE SURGERY  2010   • APPENDECTOMY  2012   • CARDIAC ABLATION  2015    for SVT        PT Ortho     Row Name 22 0800       Precautions and Contraindications    Precautions/Limitations no known precautions/limitations  -TL       Subjective Pain    Able to rate subjective pain? yes  -TL    Pre-Treatment Pain Level 0  -TL          User Key  (r) = Recorded By, (t) = Taken By, (c) = Cosigned By    Initials Name Provider Type    Carmen Walker PTA Physical Therapy Assistant                             PT Assessment/Plan     Row Name 22 0800          PT Assessment    Assessment Comments Pt working toward goals. Pt tolerated gentle resist today. Pt denies pain with RTB ex. Pt reports that she is doing her HEP. Pt denies ice this date. No goals met working toward strengthening scaps this date.  -TL            PT Plan    PT Frequency 2x/week  -TL     Predicted Duration of Therapy Intervention (PT) 4-6 weeks  -TL     PT Plan Comments add isometric shld ex next visit.  -TL           User Key  (r) = Recorded By, (t) = Taken By, (c) = Cosigned By     Initials Name Provider Type    TL Carmen Wilson PTA Physical Therapy Assistant                 Modalities     Row Name 01/17/22 0800             Subjective Comments    Subjective Comments Pt decline ice. Pt denies pain. Pt only reported discomfort while doing shld abd slides and swimmer s  -TL              Subjective Pain    Post-Treatment Pain Level 0  -TL              Ice    Patient denies application of Ice Yes  -TL            User Key  (r) = Recorded By, (t) = Taken By, (c) = Cosigned By    Initials Name Provider Type    TL Steve Carmen SEGOVIA PTA Physical Therapy Assistant               OP Exercises     Row Name 01/17/22 0800             Subjective Comments    Subjective Comments Pt decline ice. Pt denies pain. Pt only reported discomfort while doing shld abd slides and swimmer s  -TL              Subjective Pain    Able to rate subjective pain? yes  -TL      Pre-Treatment Pain Level 0  -TL      Post-Treatment Pain Level 0  -TL              Total Minutes    52110 - PT Therapeutic Exercise Minutes 39  -TL              Exercise 1    Exercise Name 1 Pro ll UE for arom and strengthening  -TL      Time 1 5 mins fwd/5 mins back  -TL      Additional Comments level 3  -TL              Exercise 2    Exercise Name 2 upper trap s  -TL      Sets 2 3  -TL      Time 2 30 sec hold  -TL              Exercise 3    Exercise Name 3 swimmers S  -TL      Reps 3 10  -TL      Time 3 10  -TL              Exercise 4    Exercise Name 4 shld rolls mid/high RTB  -TL      Sets 4 2  -TL      Reps 4 10  -TL      Time 4 5 sec hold each directions  -TL              Exercise 5    Exercise Name 5 shld pull downs  -TL      Sets 5 2  -TL      Reps 5 10  -TL      Additional Comments RTB  -TL              Exercise 6    Exercise Name 6 wall push ups  -TL      Sets 6 2  -TL      Reps 6 10  -TL              Exercise 7    Exercise Name 7 pulleys flex/abd  -TL      Time 7 2 mins each directions  -TL      Additional Comments after bike for aarom  -TL               Exercise 8    Exercise Name 8 wall slides flex/abd  -TL      Reps 8 10 each directions  -TL              Exercise 9    Exercise Name 9 IR S with strap  -TL      Reps 9 3  -TL      Time 9 30 sec hold  -TL      Additional Comments right  UE  -TL            User Key  (r) = Recorded By, (t) = Taken By, (c) = Cosigned By    Initials Name Provider Type    Carmen Walker PTA Physical Therapy Assistant                              PT OP Goals     Row Name 01/17/22 0800          PT Short Term Goals    STG Date to Achieve 02/09/22  -TL     STG 1 Pt is independent with HEP.  -TL     STG 1 Progress Ongoing; Progressing  -TL     STG 2 Pt has R shoulder AROM WNLs with minimal pain increase.  -TL     STG 2 Progress Ongoing  -TL            Long Term Goals    LTG Date to Achieve 02/23/22  -TL     LTG 1 R shoulder MMT improved to 4+/5 or better in all planes.  -TL     LTG 1 Progress Ongoing; Progressing  -TL     LTG 2 Pt is able to complete ADLs and work activities without increased R ahoulder pain.  -TL     LTG 2 Progress Ongoing  -TL     LTG 3 Pt is able to care for daughter wthout increased R shoulder pain.  -TL     LTG 3 Progress New  -TL     LTG 4 Pt demo's no posterior shoulder or UT tension/gaurding.  -TL     LTG 4 Progress New  -TL     LTG 5 QuickDASH decreased to </= 25.  -TL     LTG 5 Progress New  -TL            Time Calculation    PT Goal Re-Cert Due Date 02/02/22  -TL           User Key  (r) = Recorded By, (t) = Taken By, (c) = Cosigned By    Initials Name Provider Type    Carmen Walker PTA Physical Therapy Assistant                Therapy Education  Education Details: shld rows mid/high, lat pull downs, upper trap S, wall push up  Given: HEP, Symptoms/condition management, Pain management, Posture/body mechanics  Program: New, Reinforced  How Provided: Verbal, Demonstration, Written  Provided to: Patient  Level of Understanding: Teach back education performed, Verbalized, Demonstrated               Time Calculation:   Start Time: 0804  Stop Time: 0843  Time Calculation (min): 39 min  Timed Charges  51296 - PT Therapeutic Exercise Minutes: 39  Total Minutes  Timed Charges Total Minutes: 39   Total Minutes: 39  Therapy Charges for Today     Code Description Service Date Service Provider Modifiers Qty    66086259573 HC PT THER PROC EA 15 MIN 1/17/2022 Carmen Wilson PTA GP, CQ 3                    Carmen Wilson PTA  1/17/2022

## 2022-01-20 ENCOUNTER — APPOINTMENT (OUTPATIENT)
Dept: PHYSICAL THERAPY | Facility: HOSPITAL | Age: 27
End: 2022-01-20

## 2022-01-24 ENCOUNTER — HOSPITAL ENCOUNTER (OUTPATIENT)
Dept: PHYSICAL THERAPY | Facility: HOSPITAL | Age: 27
Setting detail: THERAPIES SERIES
Discharge: HOME | End: 2022-01-24

## 2022-01-24 DIAGNOSIS — M75.41 SHOULDER IMPINGEMENT SYNDROME, RIGHT: Primary | ICD-10-CM

## 2022-01-24 PROCEDURE — 97035 APP MDLTY 1+ULTRASOUND EA 15: CPT

## 2022-01-24 PROCEDURE — 97110 THERAPEUTIC EXERCISES: CPT

## 2022-01-24 NOTE — THERAPY TREATMENT NOTE
Outpatient Physical Therapy Ortho Treatment Note  [facility]     Patient Name: Yumiko Lopez  : 1995  MRN: [id number]  Today's Date: 2022      Visit Date: 2022  Subjective Improvement: 10%  MD visit: PRN  Visit Number: 3/3  Total Approved:25 a year  Recert Date: 2022  Visit Dx:    ICD-10-CM ICD-9-CM   1. Shoulder impingement syndrome, right  M75.41 726.2       Patient Active Problem List   Diagnosis   • Anxiety   • Arrhythmia   • Chest pain   • ALEJANDRO (dyspnea on exertion)   • Palpitations   • PAT (paroxysmal atrial tachycardia) (HCC)   • SVT (supraventricular tachycardia) (HCC)        Past Medical History:   Diagnosis Date   • Anxiety    • Personal history of cardiac arrhythmia    • SVT (supraventricular tachycardia) (HCC)     s/p ablation 2015        Past Surgical History:   Procedure Laterality Date   • ANKLE SURGERY  2010   • APPENDECTOMY  2012   • CARDIAC ABLATION  2015    for SVT        PT Ortho     Row Name 22 0800       Subjective Comments    Subjective Comments Pt denies pain this date. pt reports only 10 % improvement.   pt reports she hurts when she is working. Pt reports hurting hen counting pills and reaching OH.  -TL       Precautions and Contraindications    Precautions/Limitations no known precautions/limitations  -TL       Subjective Pain    Able to rate subjective pain? yes  -TL    Pre-Treatment Pain Level 0  -TL          User Key  (r) = Recorded By, (t) = Taken By, (c) = Cosigned By    Initials Name Provider Type    Carmen Walker PTA Physical Therapy Assistant                             PT Assessment/Plan     Row Name 22 0800          PT Assessment    Assessment Comments PTA started US prior to maual to right shld. Pt denies pain with shld rom in all planes. Pt reports usually that would hurt. Pt tolerated US well without adverse reaction this date. No new goals met at this time. Pt reports doing HEP x1 a day.  -TL             PT Plan    PT Frequency 2x/week  -TL     Predicted Duration of Therapy Intervention (PT) 4-4-6 week  -TL     PT Plan Comments continue with US  -TL           User Key  (r) = Recorded By, (t) = Taken By, (c) = Cosigned By    Initials Name Provider Type    Carmen Walker PTA Physical Therapy Assistant                 Modalities     Row Name 01/24/22 0800             Ultrasound 88242    Location right shld  -TL      Duty Cycle 100  -TL      Frequency 3.0 MHz  -TL      Intensity - Wts/cm 1.3  -TL      65816 - PT Ultrasound Minutes 8  -TL            User Key  (r) = Recorded By, (t) = Taken By, (c) = Cosigned By    Initials Name Provider Type    Carmen Walker PTA Physical Therapy Assistant               OP Exercises     Row Name 01/24/22 0800             Subjective Comments    Subjective Comments Pt denies pain this date. pt reports only 10 % improvement.   pt reports she hurts when she is working. Pt reports hurting hen counting pills and reaching OH.  -TL              Subjective Pain    Able to rate subjective pain? yes  -TL      Pre-Treatment Pain Level 0  -TL      Post-Treatment Pain Level 0  -TL              Total Minutes    57341 - PT Therapeutic Exercise Minutes 38  -TL      27275 - PT Manual Therapy Minutes 10  -TL              Exercise 1    Exercise Name 1 Pro ll UE for arom and strengthening  -TL      Time 1 5 min fwd /5 min back  -TL              Exercise 2    Exercise Name 2 upper trap S  -TL      Sets 2 3  -TL      Time 2 30 sec hold  -TL              Exercise 3    Exercise Name 3 IR S with strap  -TL      Sets 3 3  -TL      Time 3 30 sec  -TL              Exercise 4    Exercise Name 4 isometric shld flex/ext/abd/IR/ER  -TL      Reps 4 10  -TL      Time 4 10  -TL              Exercise 5    Exercise Name 5 see manual  -TL              Exercise 6    Exercise Name 6 US  -TL            User Key  (r) = Recorded By, (t) = Taken By, (c) = Cosigned By    Initials Name Provider Type    HAMILTON Wilson  Carmen SEGOVIA PTA Physical Therapy Assistant                         Manual Rx (last 36 hours)     Manual Treatments     Row Name 01/24/22 0800             Total Minutes    80715 - PT Manual Therapy Minutes 10  -TL            User Key  (r) = Recorded By, (t) = Taken By, (c) = Cosigned By    Initials Name Provider Type    Carmen Walker PTA Physical Therapy Assistant                 PT OP Goals     Row Name 01/24/22 0800          PT Short Term Goals    STG Date to Achieve 02/09/22  -TL     STG 1 Pt is independent with HEP.  -TL     STG 1 Progress Ongoing; Progressing  -TL     STG 2 Pt has R shoulder AROM WNLs with minimal pain increase.  -TL     STG 2 Progress Ongoing  -TL            Long Term Goals    LTG Date to Achieve 02/23/22  -TL     LTG 1 R shoulder MMT improved to 4+/5 or better in all planes.  -TL     LTG 1 Progress Ongoing; Progressing  -TL     LTG 2 Pt is able to complete ADLs and work activities without increased R ahoulder pain.  -TL     LTG 2 Progress Ongoing  -TL     LTG 3 Pt is able to care for daughter wthout increased R shoulder pain.  -TL     LTG 3 Progress New  -TL     LTG 4 Pt demo's no posterior shoulder or UT tension/gaurding.  -TL     LTG 4 Progress New  -TL     LTG 5 QuickDASH decreased to </= 25.  -TL     LTG 5 Progress New  -TL            Time Calculation    PT Goal Re-Cert Due Date 02/02/22  -TL           User Key  (r) = Recorded By, (t) = Taken By, (c) = Cosigned By    Initials Name Provider Type    Carmen Walker PTA Physical Therapy Assistant                Therapy Education  Education Details: isometric shld flex,ext,abd,IR,ER, levator S  Given: HEP, Symptoms/condition management, Pain management, Posture/body mechanics  Program: New, Reinforced  How Provided: Verbal, Demonstration, Written  Provided to: Patient  Level of Understanding: Teach back education performed, Verbalized, Demonstrated              Time Calculation:   Start Time: 0803  Stop Time: 0859  Time Calculation  (min): 56 min  Timed Charges  17280 - PT Ultrasound Minutes: 8  21313 - PT Therapeutic Exercise Minutes: 38  28163 - PT Manual Therapy Minutes: 10  Total Minutes  Timed Charges Total Minutes: 56   Total Minutes: 56  Therapy Charges for Today     Code Description Service Date Service Provider Modifiers Qty    91870904763 HC PT THER PROC EA 15 MIN 1/24/2022 Cramen Wilson, CHAPIN GP, CQ 3    [id number]  PT ULTRASOUND EA 15 MIN 1/24/2022 Carmen Wilson, CHAPIN GP, CQ 1                    Carmen Wilson PTA  1/24/2022

## 2022-01-27 ENCOUNTER — HOSPITAL ENCOUNTER (OUTPATIENT)
Dept: PHYSICAL THERAPY | Facility: HOSPITAL | Age: 27
Setting detail: THERAPIES SERIES
Discharge: HOME | End: 2022-01-27

## 2022-01-27 DIAGNOSIS — M75.41 SHOULDER IMPINGEMENT SYNDROME, RIGHT: Primary | ICD-10-CM

## 2022-01-27 PROCEDURE — 97140 MANUAL THERAPY 1/> REGIONS: CPT

## 2022-01-27 PROCEDURE — 97110 THERAPEUTIC EXERCISES: CPT

## 2022-01-27 PROCEDURE — 97035 APP MDLTY 1+ULTRASOUND EA 15: CPT

## 2022-01-27 NOTE — THERAPY TREATMENT NOTE
Outpatient Physical Therapy Ortho Treatment Note  [facility]     Patient Name: Yumiko Lopez  : 1995  MRN: [id number]  Today's Date: 2022      Visit Date: 2022  Subjective Improvement: 25%  MD visit: PRN  Visit Number:   Total Approved:25 a year  Recert Date: 2022  Visit Dx:    ICD-10-CM ICD-9-CM   1. Shoulder impingement syndrome, right  M75.41 726.2       Patient Active Problem List   Diagnosis   • Anxiety   • Arrhythmia   • Chest pain   • ALEJANDRO (dyspnea on exertion)   • Palpitations   • PAT (paroxysmal atrial tachycardia) (HCC)   • SVT (supraventricular tachycardia) (HCC)        Past Medical History:   Diagnosis Date   • Anxiety    • Personal history of cardiac arrhythmia    • SVT (supraventricular tachycardia) (HCC)     s/p ablation 2015        Past Surgical History:   Procedure Laterality Date   • ANKLE SURGERY  2010   • APPENDECTOMY  2012   • CARDIAC ABLATION  2015    for SVT        PT Ortho     Row Name 22 0700       Subjective Comments    Subjective Comments Pt reports right shld is getting better.  Pt reports 25% improved.  Pt reports doing HEP twice a day.  -TL       Precautions and Contraindications    Precautions/Limitations no known precautions/limitations  -TL       Subjective Pain    Able to rate subjective pain? yes  -TL    Pre-Treatment Pain Level 0  no pain at rest but still some with reaching  -TL       Right Upper Ext    Rt Shoulder Abduction AROM 165  -TL    Rt Shoulder Flexion AROM 160  -TL    Rt Shoulder External Rotation AROM 85  -TL    Rt Shoulder Internal Rotation AROM 80  -TL          User Key  (r) = Recorded By, (t) = Taken By, (c) = Cosigned By    Initials Name Provider Type    Carmen Walker, PTA Physical Therapy Assistant                             PT Assessment/Plan     Row Name 22 0800          PT Assessment    Assessment Comments Pt has met all short term goals. Pt reports that she is 25% improved. pt has  WNL AROM to the right shld.  Pt reports US has helped. Instructed pt not to wear tight shirts to restrict movement.  -TL            PT Plan    PT Frequency 2x/week  -TL     Predicted Duration of Therapy Intervention (PT) 4-6 weeks  -TL     PT Plan Comments add chest press and shld strengthening IR/ER using band.  -TL           User Key  (r) = Recorded By, (t) = Taken By, (c) = Cosigned By    Initials Name Provider Type    Carmen Walker PTA Physical Therapy Assistant                 Modalities     Row Name 01/27/22 0700             Ultrasound 59138    Location right shld  -TL      Duty Cycle 100  -TL      Frequency 3.0 MHz  -TL      Intensity - Wts/cm 1.3  -TL      33248 - PT Ultrasound Minutes 8  -TL            User Key  (r) = Recorded By, (t) = Taken By, (c) = Cosigned By    Initials Name Provider Type    Carmen Walker PTA Physical Therapy Assistant               OP Exercises     Row Name 01/27/22 0800 01/27/22 0700          Subjective Comments    Subjective Comments -- Pt reports right shld is getting better.  Pt reports 25% improved.  Pt reports doing HEP twice a day.  -TL            Subjective Pain    Able to rate subjective pain? -- yes  -TL     Pre-Treatment Pain Level -- 0  no pain at rest but still some with reaching  -TL            Total Minutes    30708 - PT Therapeutic Exercise Minutes 25  -TL --     09105 - PT Manual Therapy Minutes 15  -TL --            Exercise 1    Exercise Name 1 -- US  -TL     Time 1 -- 8 mins  -TL            Exercise 2    Exercise Name 2 -- manual upper trap  -TL     Time 2 -- 8min  -TL     Additional Comments -- sitting  -TL            Exercise 3    Exercise Name 3 -- corner S  -TL     Sets 3 -- 3  -TL     Time 3 -- 30 sec hold  -TL            Exercise 4    Exercise Name 4 -- levator S  -TL     Sets 4 -- 2  -TL     Time 4 -- 30 sec hold  -TL            Exercise 5    Exercise Name 5 -- manual supine with levator  -TL     Time 5 -- 5 mins  -TL     Additional Comments  -- and upper trap  -TL            Exercise 6    Exercise Name 6 -- education on 1st rib S with strap  -TL     Sets 6 -- 1  -TL     Time 6 -- 30 sec hold  -TL     Additional Comments -- with cspine SB  -TL            Exercise 7    Exercise Name 7 -- see measurement  -TL            Exercise 8    Exercise Name 8 -- Prone YTI's  -TL     Reps 8 -- 10 each directions  -TL           User Key  (r) = Recorded By, (t) = Taken By, (c) = Cosigned By    Initials Name Provider Type    Carmen Walker PTA Physical Therapy Assistant                         Manual Rx (last 36 hours)     Manual Treatments     Row Name 01/27/22 0800             Total Minutes    92319 - PT Manual Therapy Minutes 15  -TL            User Key  (r) = Recorded By, (t) = Taken By, (c) = Cosigned By    Initials Name Provider Type    Carmen Walker PTA Physical Therapy Assistant                 PT OP Goals     Row Name 01/27/22 0800          PT Short Term Goals    STG Date to Achieve 02/09/22  -TL     STG 1 Pt is independent with HEP.  -TL     STG 1 Progress Ongoing; Met  -TL     STG 2 Pt has R shoulder AROM WNLs with minimal pain increase.  -TL     STG 2 Progress Met  -TL            Long Term Goals    LTG Date to Achieve 02/23/22  -TL     LTG 1 R shoulder MMT improved to 4+/5 or better in all planes.  -TL     LTG 1 Progress Ongoing; Progressing  -TL     LTG 2 Pt is able to complete ADLs and work activities without increased R ahoulder pain.  -TL     LTG 2 Progress Ongoing; Progressing  -TL     LTG 3 Pt is able to care for daughter wthout increased R shoulder pain.  -TL     LTG 3 Progress New  -TL     LTG 4 Pt demo's no posterior shoulder or UT tension/gaurding.  -TL     LTG 4 Progress Progressing; Ongoing  -TL     LTG 5 QuickDASH decreased to </= 25.  -TL     LTG 5 Progress New  -TL            Time Calculation    PT Goal Re-Cert Due Date 02/02/22  -TL           User Key  (r) = Recorded By, (t) = Taken By, (c) = Cosigned By    Initials Name Provider  Type    TL Carmen Wilson PTA Physical Therapy Assistant                Therapy Education  Education Details: prone YTI's  Given: HEP  Program: New, Reinforced  How Provided: Verbal, Demonstration, Written  Provided to: Patient  Level of Understanding: Teach back education performed, Verbalized, Demonstrated              Time Calculation:   Start Time: 0758  Stop Time: 0843  Time Calculation (min): 45 min  Timed Charges  89025 - PT Ultrasound Minutes: 8  94469 - PT Therapeutic Exercise Minutes: 25  91496 - PT Manual Therapy Minutes: 15  Total Minutes  Timed Charges Total Minutes: 48   Total Minutes: 48  Therapy Charges for Today     Code Description Service Date Service Provider Modifiers Qty    86436268345 HC PT THER PROC EA 15 MIN 1/27/2022 Carmen Wilson PTA GP, CQ 1    45063052357 HC PT ULTRASOUND EA 15 MIN 1/27/2022 Carmen Wilson PTA GP, CQ 1    [id number] HC PT MANUAL THERAPY EA 15 MIN 1/27/2022 Carmen Wilson PTA GP, CQ 1                    Carmen Wilson PTA  1/27/2022

## 2022-01-31 ENCOUNTER — HOSPITAL ENCOUNTER (OUTPATIENT)
Dept: PHYSICAL THERAPY | Facility: HOSPITAL | Age: 27
Setting detail: THERAPIES SERIES
Discharge: HOME | End: 2022-01-31

## 2022-01-31 DIAGNOSIS — M75.41 SHOULDER IMPINGEMENT SYNDROME, RIGHT: Primary | ICD-10-CM

## 2022-01-31 PROCEDURE — 97035 APP MDLTY 1+ULTRASOUND EA 15: CPT

## 2022-01-31 PROCEDURE — 97110 THERAPEUTIC EXERCISES: CPT

## 2022-02-03 ENCOUNTER — APPOINTMENT (OUTPATIENT)
Dept: PHYSICAL THERAPY | Facility: HOSPITAL | Age: 27
End: 2022-02-03

## 2022-02-07 ENCOUNTER — TELEPHONE (OUTPATIENT)
Dept: PHYSICAL THERAPY | Facility: HOSPITAL | Age: 27
End: 2022-02-07

## 2022-02-10 ENCOUNTER — HOSPITAL ENCOUNTER (OUTPATIENT)
Dept: PHYSICAL THERAPY | Facility: HOSPITAL | Age: 27
Setting detail: THERAPIES SERIES
Discharge: HOME | End: 2022-02-10

## 2022-02-10 DIAGNOSIS — M75.41 SHOULDER IMPINGEMENT SYNDROME, RIGHT: Primary | ICD-10-CM

## 2022-02-10 PROCEDURE — 97110 THERAPEUTIC EXERCISES: CPT

## 2022-02-10 PROCEDURE — 97140 MANUAL THERAPY 1/> REGIONS: CPT

## 2022-02-10 NOTE — THERAPY PROGRESS REPORT/RE-CERT
Outpatient Physical Therapy Ortho Progress Note  [facility]     Patient Name: Yumiko Lopez  : 1995  MRN: [id number]  Today's Date: 2/10/2022      Visit Date: 02/10/2022     ATTENDANCE:   SUBJECTIVE IMPROVEMENT: 40%  NEXT MD APPOINTMENT: PRN  RECERT DATE: 3/3/22    THERAPY DIAGNOSIS: R shoulder pain       Visit Dx:    ICD-10-CM ICD-9-CM   1. Shoulder impingement syndrome, right  M75.41 726.2       Patient Active Problem List   Diagnosis   • Anxiety   • Arrhythmia   • Chest pain   • ALEJANDRO (dyspnea on exertion)   • Palpitations   • PAT (paroxysmal atrial tachycardia) ([facility])   • SVT (supraventricular tachycardia) ([facility])        Past Medical History:   Diagnosis Date   • Anxiety    • Personal history of cardiac arrhythmia    • SVT (supraventricular tachycardia) ([facility])     s/p ablation 2015        Past Surgical History:   Procedure Laterality Date   • ANKLE SURGERY  2010   • APPENDECTOMY  2012   • CARDIAC ABLATION  2015    for SVT        PT Ortho     Row Name 02/10/22 0800       Subjective Comments    Subjective Comments Pt notes that she was feeling much better however over the past weekend she notes that Shoulder pain increased drastically and she also noticed some neck pain and headaches which have persisted. Pain comes/goes at worse /10 and currently 2/10. notes she has been trying to do HEP however not going well, denies any use of heat/ice for pain. Reports likely pain increase secondary to moving with lots of lifting and overhead reaching to pack/unpack.  -AC       Precautions and Contraindications    Precautions/Limitations no known precautions/limitations  -AC       Subjective Pain    Able to rate subjective pain? yes  -AC    Pre-Treatment Pain Level 2  -AC       Posture/Observations    Posture/Observations Comments rounded shoulders posture. mild forward head. TTP to B UT, SCM, scalenes, and suboccipitals  -AC       Right Upper Ext    Rt Upper Extremity Comments  grossly  WFLs  -AC       MMT Right Upper Ext    Rt Shoulder Flexion MMT, Gross Movement (4+/5) good plus  -AC    Rt Shoulder Extension MMT, Gross Movement (5/5) normal  -AC    Rt Shoulder ABduction MMT, Gross Movement (4/5) good  -AC    Rt Shoulder ADduction MMT, Gross Movement (4+/5) good plus  -AC    Rt Shoulder Internal Rotation MMT, Gross Movement (4+/5) good plus  -AC    Rt Shoulder External Rotation MMT, Gross Movement (4+/5) good plus  -AC          User Key  (r) = Recorded By, (t) = Taken By, (c) = Cosigned By    Initials Name Provider Type    AC Anitha Gonzalez, PT Physical Therapist                             PT Assessment/Plan     Row Name 02/10/22 0800          PT Assessment    Functional Limitations Limitation in home management; Limitations in community activities; Limitations in functional capacity and performance; Performance in leisure activities; Performance in work activities  -AC     Impairments Impaired flexibility; Muscle strength; Pain; Posture  -AC     Assessment Comments re-evaluation completed today with all STGs met and no LTGs met today. However good progress towards strength goals at this time. She continues to have pain which limits caretaking and work activities however overall slowly improving. Small set back with pt having overuse of UEs with moving and a lot of lifting/reaching overhead throughout the past week which has increased neck and shoulder pain. She will continue to benefit from skilled PT to improve UE strength, flexibility, and posture to decrease pain and return to PLOF. Pt educated to d/c band exercises for next 2-3 days with focus on stretching and gentle scap strength and instructed in self trigger point release for sub occipitals and UT with tennis ball if pain continues then can slowly work tband activities back in as pain allows.  -AC     Rehab Potential Good  -AC     Patient/caregiver participated in establishment of treatment plan and goals Yes  -AC     Patient would  benefit from skilled therapy intervention Yes  -AC            PT Plan    PT Frequency 2x/week  -AC     Predicted Duration of Therapy Intervention (PT) 3-4 more weeks  -AC     PT Plan Comments continue with manual as beneficial manual trigger point release. gentle increase in strength as tolerated. continue with postural re-e  -AC           User Key  (r) = Recorded By, (t) = Taken By, (c) = Cosigned By    Initials Name Provider Type    AC Anitha Gonzalez, PT Physical Therapist                   OP Exercises     Row Name 02/10/22 0800             Subjective Comments    Subjective Comments Pt notes that she was feeling much better however over the past weekend she notes that Shoulder pain increased drastically and she also noticed some neck pain and headaches which have persisted. Pain comes/goes at worse 6/10 and currently 2/10. notes she has been trying to do HEP however not going well, denies any use of heat/ice for pain. Reports likely pain increase secondary to moving with lots of lifting and overhead reaching to pack/unpack.  -AC              Subjective Pain    Able to rate subjective pain? yes  -AC      Pre-Treatment Pain Level 2  -AC              Total Minutes    26277 - PT Therapeutic Exercise Minutes 17  -AC      70299 - PT Manual Therapy Minutes 23  -AC              Exercise 1    Exercise Name 1 Pro II-  L3.5  -AC      Time 1 5 fdw/5 back  -AC              Exercise 2    Exercise Name 2 see manual  -AC      Time 2 23 min  -AC              Exercise 3    Exercise Name 3 UT stretch  -AC      Sets 3 3  -AC      Time 3 30s  -AC              Exercise 4    Exercise Name 4 LS stretch  -AC      Sets 4 3  -AC      Time 4 30s  -AC              Exercise 5    Exercise Name 5 SCM stretch  -AC      Sets 5 3  -AC      Time 5 30s  -AC              Exercise 6    Exercise Name 6 no money with YTB  -AC      Sets 6 2  -AC      Reps 6 10  -AC              Exercise 7    Exercise Name 7 scap squeezes  -AC      Sets 7 2  -AC       Reps 7 10  -AC              Exercise 8    Exercise Name 8 ROM/MMT- see ortho  -AC      Sets 8 --  -AC      Reps 8 --  -AC      Additional Comments --  -AC            User Key  (r) = Recorded By, (t) = Taken By, (c) = Cosigned By    Initials Name Provider Type    AC Anitha Gonzalez, PT Physical Therapist                         Manual Rx (last 36 hours)     Manual Treatments     Row Name 02/10/22 0800 02/10/22 0700          Total Minutes    27826 - PT Manual Therapy Minutes 23  -AC --            Manual Rx 1    Manual Rx 1 Location -- B UT, SCM, scalenes, and sub occipitals  -     Manual Rx 1 Type -- manual trigger point relase, opposing thumbs  -AC     Manual Rx 1 Duration -- 23 min  -           User Key  (r) = Recorded By, (t) = Taken By, (c) = Cosigned By    Initials Name Provider Type    AC Anitha Gonzalez, PT Physical Therapist                 PT OP Goals     Row Name 02/10/22 0800          PT Short Term Goals    STG Date to Achieve 02/09/22  -     STG 1 Pt is independent with HEP.  -AC     STG 1 Progress Ongoing; Met  -AC     STG 2 Pt has R shoulder AROM WNLs with minimal pain increase.  -     STG 2 Progress Met  -            Long Term Goals    LTG Date to Achieve 02/23/22  -     LTG 1 R shoulder MMT improved to 4+/5 or better in all planes.  -AC     LTG 1 Progress Not Met  -AC     LTG 2 Pt is able to complete ADLs and work activities without increased R shoulder pain.  -AC     LTG 2 Progress Not Met  -AC     LTG 2 Progress Comments pain increases towards the end of the day.  -AC     LTG 3 Pt is able to care for daughter without increased R shoulder pain.  -AC     LTG 3 Progress Not Met  -AC     LTG 3 Progress Comments pain increases with lifting daughter  -AC     LTG 4 Pt demo's no posterior shoulder or UT tension/guarding.  -AC     LTG 4 Progress Not Met  -AC     LTG 4 Progress Comments tender B UT, SCM, and suboccipitals.  -AC     LTG 5 QuickDASH decreased to </= 25.  -AC     LTG 5 Progress  Ongoing  -AC            Time Calculation    PT Goal Re-Cert Due Date 03/03/22  -           User Key  (r) = Recorded By, (t) = Taken By, (c) = Cosigned By    Initials Name Provider Type    Anitha Wong PT Physical Therapist                               Time Calculation:   Start Time: 0806  Stop Time: 0846  Time Calculation (min): 40 min  Timed Charges  50806 - PT Therapeutic Exercise Minutes: 17  97152 - PT Manual Therapy Minutes: 23  Total Minutes  Timed Charges Total Minutes: 40   Total Minutes: 40  Therapy Charges for Today     Code Description Service Date Service Provider Modifiers Qty    69384334923 HC PT THER PROC EA 15 MIN 2/10/2022 Anitha Gonzalez, PT GP 1    14842066202 HC PT MANUAL THERAPY EA 15 MIN 2/10/2022 Anitha Gonzalez, PT GP 2                    Anitha Gonzalez, PT  2/10/2022

## 2022-02-14 ENCOUNTER — HOSPITAL ENCOUNTER (OUTPATIENT)
Dept: PHYSICAL THERAPY | Facility: HOSPITAL | Age: 27
Setting detail: THERAPIES SERIES
Discharge: HOME | End: 2022-02-14

## 2022-02-14 DIAGNOSIS — M75.41 SHOULDER IMPINGEMENT SYNDROME, RIGHT: Primary | ICD-10-CM

## 2022-02-14 PROCEDURE — 97110 THERAPEUTIC EXERCISES: CPT

## 2022-02-14 PROCEDURE — 97140 MANUAL THERAPY 1/> REGIONS: CPT

## 2022-02-14 NOTE — THERAPY TREATMENT NOTE
Outpatient Physical Therapy Ortho Treatment Note  [facility]     Patient Name: Yumiko Lopez  : 1995  MRN: [id number]  Today's Date: 2022      Visit Date: 2022  Subjective Improvement: 60%  MD visit: PRN  Visit Number: 7  Total Approved:25 a year  Recert Date: 3/3/2022  Visit Dx:    ICD-10-CM ICD-9-CM   1. Shoulder impingement syndrome, right  M75.41 726.2       Patient Active Problem List   Diagnosis   • Anxiety   • Arrhythmia   • Chest pain   • ALEJANDRO (dyspnea on exertion)   • Palpitations   • PAT (paroxysmal atrial tachycardia) ([facility])   • SVT (supraventricular tachycardia) ([facility])        Past Medical History:   Diagnosis Date   • Anxiety    • Personal history of cardiac arrhythmia    • SVT (supraventricular tachycardia) ([facility])     s/p ablation 2015        Past Surgical History:   Procedure Laterality Date   • ANKLE SURGERY  2010   • APPENDECTOMY  2012   • CARDIAC ABLATION  2015    for SVT        PT Ortho     Row Name 22 0800       Subjective Comments    Subjective Comments Pt reports today 60% improved.  Pt reports that she has been moving and lifting boxes. Pt worked on getting dishes out of the cabinet with head elevated.  -TL       Precautions and Contraindications    Precautions/Limitations no known precautions/limitations  -TL       Subjective Pain    Able to rate subjective pain? yes  -TL    Pre-Treatment Pain Level 0  -TL          User Key  (r) = Recorded By, (t) = Taken By, (c) = Cosigned By    Initials Name Provider Type    Carmen Walker PTA Physical Therapy Assistant                             PT Assessment/Plan     Row Name 22 0800          PT Assessment    Assessment Comments Pt reports 60% improved. Pt denies pain today. Treatment focused on manual this date. Pt did respond well to trigger point release. Pt keeps shld and chin in elevated position. pt does require cues for proper posturing.   Pt denies pain after treatment.  Pt has  met all short term goals and working toward long term goals.  -TL            PT Plan    PT Frequency 2x/week  -TL     Predicted Duration of Therapy Intervention (PT) 3-4 weeks  -TL     PT Plan Comments continue to work on strength.  Add 3-way bicep next visit.  -TL           User Key  (r) = Recorded By, (t) = Taken By, (c) = Cosigned By    Initials Name Provider Type    TL Carmen Wilson, PTA Physical Therapy Assistant                   OP Exercises     Row Name 02/14/22 0800 02/14/22 0700          Subjective Comments    Subjective Comments Pt reports today 60% improved.  Pt reports that she has been moving and lifting boxes. Pt worked on getting dishes out of the cabinet with head elevated.  -TL --            Subjective Pain    Able to rate subjective pain? yes  -TL yes  -TL     Pre-Treatment Pain Level 0  -TL 0  -TL     Post-Treatment Pain Level -- 0  -TL            Total Minutes    98526 - PT Therapeutic Exercise Minutes 27  -TL --     92272 - PT Manual Therapy Minutes 15  -TL --            Exercise 1    Exercise Name 1 -- pro ll level 4  -TL     Time 1 -- 5 mins fwd/5 mins back  -TL            Exercise 2    Exercise Name 2 -- corner S  -TL     Sets 2 -- 3  -TL     Time 2 -- 30 sec hold  -TL            Exercise 3    Exercise Name 3 -- 1 rib self shld mob  -TL     Sets 3 -- 3  -TL     Time 3 -- 30 sec hold  -TL     Additional Comments -- both sides  -TL            Exercise 4    Exercise Name 4 -- shld rows mid/high  -TL     Sets 4 -- 2  -TL     Reps 4 -- 10  -TL     Time 4 -- 5 sec hold  -TL     Additional Comments -- GTB  -TL            Exercise 5    Exercise Name 5 -- shld lat pull downs  -TL     Sets 5 -- 2  -TL     Reps 5 -- 10  -TL     Additional Comments -- GTB  -TL            Exercise 6    Exercise Name 6 -- chest pulls with chin tuck  -TL     Additional Comments -- RTB  -TL            Exercise 7    Exercise Name 7 -- see manual  -TL           User Key  (r) = Recorded By, (t) = Taken By, (c) = Cosigned By     Initials Name Provider Type    Carmen Walker PTA Physical Therapy Assistant                         Manual Rx (last 36 hours)     Manual Treatments     Row Name 02/14/22 0800             Total Minutes    92570 - PT Manual Therapy Minutes 15  -TL              Manual Rx 1    Manual Rx 1 Location both UT, SCM,sclenes, sub occi  -TL      Manual Rx 1 Duration 15  -TL            User Key  (r) = Recorded By, (t) = Taken By, (c) = Cosigned By    Initials Name Provider Type    Carmen Walker PTA Physical Therapy Assistant                 PT OP Goals     Row Name 02/14/22 0800          PT Short Term Goals    STG Date to Achieve 02/09/22  -TL     STG 1 Pt is independent with HEP.  -TL     STG 1 Progress Ongoing; Met  -TL     STG 2 Pt has R shoulder AROM WNLs with minimal pain increase.  -TL     STG 2 Progress Met  -TL            Long Term Goals    LTG Date to Achieve 02/23/22  -TL     LTG 1 R shoulder MMT improved to 4+/5 or better in all planes.  -TL     LTG 1 Progress Ongoing  -TL     LTG 2 Pt is able to complete ADLs and work activities without increased R ahoulder pain.  -TL     LTG 2 Progress Not Met; Ongoing; Progressing  -TL     LTG 3 Pt is able to care for daughter wthout increased R shoulder pain.  -TL     LTG 3 Progress Ongoing  -TL     LTG 4 Pt demo's no posterior shoulder or UT tension/gaurding.  -TL     LTG 4 Progress Ongoing  -TL     LTG 5 QuickDASH decreased to </= 25.  -TL     LTG 5 Progress Ongoing  -TL           User Key  (r) = Recorded By, (t) = Taken By, (c) = Cosigned By    Initials Name Provider Type    Carmen Walker PTA Physical Therapy Assistant                               Time Calculation:   Start Time: 0758  Stop Time: 0840  Time Calculation (min): 42 min  Timed Charges  19888 - PT Therapeutic Exercise Minutes: 27  55824 - PT Manual Therapy Minutes: 15  Total Minutes  Timed Charges Total Minutes: 42   Total Minutes: 42  Therapy Charges for Today     Code Description Service  Date Service Provider Modifiers Qty    [id number]  PT THER PROC EA 15 MIN 2/14/2022 Carmen Wilson, CHAPIN GP, CQ 2    [id number]  PT MANUAL THERAPY EA 15 MIN 2/14/2022 Carmen Wilson, CHAPIN GP, CQ 1                    Carmen Wilson PTA  2/14/2022

## 2022-02-17 ENCOUNTER — HOSPITAL ENCOUNTER (OUTPATIENT)
Dept: PHYSICAL THERAPY | Facility: HOSPITAL | Age: 27
Setting detail: THERAPIES SERIES
Discharge: HOME | End: 2022-02-17

## 2022-02-17 DIAGNOSIS — M75.41 SHOULDER IMPINGEMENT SYNDROME, RIGHT: Primary | ICD-10-CM

## 2022-02-17 PROCEDURE — 97140 MANUAL THERAPY 1/> REGIONS: CPT

## 2022-02-17 PROCEDURE — 97110 THERAPEUTIC EXERCISES: CPT

## 2022-02-17 NOTE — THERAPY TREATMENT NOTE
Outpatient Physical Therapy Ortho Treatment Note  [facility]     Patient Name: Yumiko Lopez  : 1995  MRN: [id number]  Today's Date: 2022      Visit Date: 2022  Subjective Improvement: 70%  MD visit: PRN  Visit Number:   Total Approved:25 a year  Recert Date: 3/3/2022  Visit Dx:    ICD-10-CM ICD-9-CM   1. Shoulder impingement syndrome, right  M75.41 726.2       Patient Active Problem List   Diagnosis   • Anxiety   • Arrhythmia   • Chest pain   • ALEJANDRO (dyspnea on exertion)   • Palpitations   • PAT (paroxysmal atrial tachycardia) ([facility])   • SVT (supraventricular tachycardia) ([facility])        Past Medical History:   Diagnosis Date   • Anxiety    • Personal history of cardiac arrhythmia    • SVT (supraventricular tachycardia) ([facility])     s/p ablation 2015        Past Surgical History:   Procedure Laterality Date   • ANKLE SURGERY  2010   • APPENDECTOMY  2012   • CARDIAC ABLATION  2015    for SVT        PT Ortho     Row Name 22 0800       Precautions and Contraindications    Precautions/Limitations no known precautions/limitations  -TL          User Key  (r) = Recorded By, (t) = Taken By, (c) = Cosigned By    Initials Name Provider Type    Carmen Walker PTA Physical Therapy Assistant                             PT Assessment/Plan     Row Name 22 0800          PT Assessment    Assessment Comments Treatment short today secondary to pt came in 9mins late. Pt daughter was not having a good morning. pt reports that she was up with daughter early this morning. pt reports that she is 70% improved. Pt denies pain at this time right shld.  Pt tolerated new ex this date with shld diagnols with RTB. PTA added to HEP.  Pt has met all short term goals, LTG's #3 met. Pt progressing well toward goals.  -TL            PT Plan    PT Frequency 2x/week  -TL     Predicted Duration of Therapy Intervention (PT) 3-4 weeks  -TL     PT Plan Comments continue ER/IR TB, start Shld  flex/abd to 90 TB ex.  -TL           User Key  (r) = Recorded By, (t) = Taken By, (c) = Cosigned By    Initials Name Provider Type    Carmen Walker PTA Physical Therapy Assistant                   OP Exercises     Row Name 02/17/22 0800             Subjective Comments    Subjective Comments Pt reported headache 2/10, no pain right shld. Pt reports 70% improved.  -TL              Subjective Pain    Able to rate subjective pain? yes  -TL      Pre-Treatment Pain Level --  2/10 head ache, no pain in right shld  -TL      Post-Treatment Pain Level 0  -TL              Total Minutes    49287 - PT Manual Therapy Minutes 15  -TL              Exercise 1    Exercise Name 1 Pro ll level 4  -TL      Time 1 5 mins fwd/5 mins back  -TL              Exercise 2    Exercise Name 2 corner S  -TL      Sets 2 3  -TL      Time 2 30 sec  -TL              Exercise 3    Exercise Name 3 1 rib self shld mob  -TL      Sets 3 3  -TL      Time 3 30 sec gold  -TL              Exercise 4    Exercise Name 4 3-way bicep curls  -TL      Sets 4 2  -TL      Reps 4 10  -TL              Exercise 5    Exercise Name 5 shld diaganols  -TL      Sets 5 2  -TL      Reps 5 10  -TL      Additional Comments RTB  -TL              Exercise 6    Exercise Name 6 see manual  -TL            User Key  (r) = Recorded By, (t) = Taken By, (c) = Cosigned By    Initials Name Provider Type    TL Carmen Wilson PTA Physical Therapy Assistant                         Manual Rx (last 36 hours)     Manual Treatments     Row Name 02/17/22 0800 02/17/22 0700          Total Minutes    41100 - PT Manual Therapy Minutes 15  -TL --            Manual Rx 1    Manual Rx 1 Location -- both UT, SCM,sclenes, sub occi  -TL     Manual Rx 1 Type -- opposing thumbs, manual trigger release  -TL     Manual Rx 1 Duration -- 15  -TL           User Key  (r) = Recorded By, (t) = Taken By, (c) = Cosigned By    Initials Name Provider Type    Carmen Walker PTA Physical Therapy Assistant                  PT OP Goals     Row Name 02/17/22 0800          PT Short Term Goals    STG Date to Achieve 02/09/22  -TL     STG 1 Pt is independent with HEP.  -TL     STG 1 Progress Ongoing; Met  -TL     STG 2 Pt has R shoulder AROM WNLs with minimal pain increase.  -TL     STG 2 Progress Met  -TL            Long Term Goals    LTG Date to Achieve 02/23/22  -TL     LTG 1 R shoulder MMT improved to 4+/5 or better in all planes.  -TL     LTG 1 Progress Ongoing; Progressing  -TL     LTG 2 Pt is able to complete ADLs and work activities without increased R ahoulder pain.  -TL     LTG 2 Progress Not Met; Ongoing; Progressing  -TL     LTG 2 Progress Comments Pt reports pain not as bad but still has pain at the end of the day.  -TL     LTG 3 Pt is able to care for daughter wthout increased R shoulder pain.  -TL     LTG 3 Progress Met  -TL     LTG 3 Progress Comments per patient  -TL     LTG 4 Pt demo's no posterior shoulder or UT tension/gaurding.  -TL     LTG 4 Progress Ongoing; Progressing  -TL     LTG 5 QuickDASH decreased to </= 25.  -TL     LTG 5 Progress Ongoing  -TL            Time Calculation    PT Goal Re-Cert Due Date 03/03/22  -TL           User Key  (r) = Recorded By, (t) = Taken By, (c) = Cosigned By    Initials Name Provider Type    Carmen Walker, CHAPIN Physical Therapy Assistant                Therapy Education  Education Details: shld diag  Given: HEP, Symptoms/condition management, Pain management, Posture/body mechanics  Program: New, Reinforced  How Provided: Verbal, Demonstration, Written  Provided to: Patient  Level of Understanding: Verbalized, Demonstrated, Teach back education performed              Time Calculation:   Start Time: 0809  Stop Time: 0849  Time Calculation (min): 40 min  Timed Charges  19547 - PT Manual Therapy Minutes: 15  Total Minutes  Timed Charges Total Minutes: 15   Total Minutes: 15  Therapy Charges for Today     Code Description Service Date Service Provider Modifiers Qty     [id number]  PT MANUAL THERAPY EA 15 MIN 2/17/2022 Carmen Wilson, CHAPIN GP, CQ 1    [id number]  PT THER PROC EA 15 MIN 2/17/2022 Carmen Wilson, CHAPIN GP, CQ 2                    Carmen Wilson, PTA  2/17/2022

## 2022-02-21 ENCOUNTER — APPOINTMENT (OUTPATIENT)
Dept: PHYSICAL THERAPY | Facility: HOSPITAL | Age: 27
End: 2022-02-21

## 2022-02-24 ENCOUNTER — HOSPITAL ENCOUNTER (OUTPATIENT)
Dept: PHYSICAL THERAPY | Facility: HOSPITAL | Age: 27
Setting detail: THERAPIES SERIES
Discharge: HOME | End: 2022-02-24

## 2022-02-24 DIAGNOSIS — M75.41 SHOULDER IMPINGEMENT SYNDROME, RIGHT: Primary | ICD-10-CM

## 2022-02-24 PROCEDURE — 97110 THERAPEUTIC EXERCISES: CPT

## 2022-02-24 NOTE — THERAPY TREATMENT NOTE
Outpatient Physical Therapy Ortho Treatment Note  [facility]     Patient Name: Yumiko Lopez  : 1995  MRN: [id number]  Today's Date: 2022      Visit Date: 2022     ATTENDANCE: 9/10  SUBJECTIVE IMPROVEMENT: 70%  NEXT MD APPOINTMENT: PRN  RECERT DATE: 3/3/22    THERAPY DIAGNOSIS: R shoulder pain       Visit Dx:    ICD-10-CM ICD-9-CM   1. Shoulder impingement syndrome, right  M75.41 726.2       Patient Active Problem List   Diagnosis   • Anxiety   • Arrhythmia   • Chest pain   • ALEJANDRO (dyspnea on exertion)   • Palpitations   • PAT (paroxysmal atrial tachycardia) ([facility])   • SVT (supraventricular tachycardia) ([facility])        Past Medical History:   Diagnosis Date   • Anxiety    • Personal history of cardiac arrhythmia    • SVT (supraventricular tachycardia) ([facility])     s/p ablation 2015        Past Surgical History:   Procedure Laterality Date   • ANKLE SURGERY  2010   • APPENDECTOMY  2012   • CARDIAC ABLATION  2015    for SVT        PT Ortho     Row Name 22 0800       Subjective Comments    Subjective Comments Pt notes doing well this morning, no shower pain , neck feels a bit stiff however does think manual is helping.  -AC       Precautions and Contraindications    Precautions/Limitations no known precautions/limitations  -AC       Subjective Pain    Able to rate subjective pain? yes  -AC    Pre-Treatment Pain Level 0  -AC          User Key  (r) = Recorded By, (t) = Taken By, (c) = Cosigned By    Initials Name Provider Type    AC Anitha Gonzalez, PT Physical Therapist                             PT Assessment/Plan     Row Name 22 0800          PT Assessment    Assessment Comments treatment tolerated well today with increased resistance with all activities today. Continues to demo UT, SCM, and scalenes tightness bilaterally. She continues to progress well towards goals.  -AC            PT Plan    PT Frequency 2x/week  -AC     Predicted Duration of Therapy  Intervention (PT) 3-4 weeks  -AC     PT Plan Comments if pain continues to improve might d/c in next 1-2 weeks.  -AC           User Key  (r) = Recorded By, (t) = Taken By, (c) = Cosigned By    Initials Name Provider Type    Anitha Wong, PT Physical Therapist                   OP Exercises     Row Name 02/24/22 0800             Subjective Comments    Subjective Comments Pt notes doing well this morning, no shower pain , neck feels a bit stiff however does think manual is helping.  -AC              Subjective Pain    Able to rate subjective pain? yes  -AC      Pre-Treatment Pain Level 0  -AC              Total Minutes    75905 - PT Therapeutic Exercise Minutes 43  -AC              Exercise 1    Exercise Name 1 Pro II- L4  -AC      Time 1 5 min fdw/5 min back  -AC              Exercise 2    Exercise Name 2 supine serratus punches  -AC      Sets 2 2  -AC      Reps 2 15  -AC      Additional Comments 3# DB  -AC              Exercise 3    Exercise Name 3 SL ER  -AC      Sets 3 3  -AC      Reps 3 10  -AC      Additional Comments 3# DB  -AC              Exercise 4    Exercise Name 4 prone I/Y/T/rows  -AC      Sets 4 1  -AC      Reps 4 15  -AC      Additional Comments 2# DB  -AC              Exercise 5    Exercise Name 5 QP UE reaching  -AC      Sets 5 1  -AC      Reps 5 10  -AC      Time 5 5s hold  -AC              Exercise 6    Exercise Name 6 push-ups on raised plynth  -AC      Sets 6 2  -AC      Reps 6 10  -AC              Exercise 7    Exercise Name 7 UT/LS stretching  -AC      Sets 7 3  -AC      Time 7 30s  -AC              Exercise 8    Exercise Name 8 pec stretch at doorway  -AC      Sets 8 3  -AC      Time 8 30s  -AC            User Key  (r) = Recorded By, (t) = Taken By, (c) = Cosigned By    Initials Name Provider Type    Anitha Wong, PT Physical Therapist                              PT OP Goals     Row Name 02/24/22 0800          PT Short Term Goals    STG Date to Achieve 02/09/22  -AC     STG  1 Pt is independent with HEP.  -AC     STG 1 Progress Ongoing; Met  -AC     STG 2 Pt has R shoulder AROM WNLs with minimal pain increase.  -AC     STG 2 Progress Met  -AC            Long Term Goals    LTG Date to Achieve 02/23/22  -AC     LTG 1 R shoulder MMT improved to 4+/5 or better in all planes.  -AC     LTG 1 Progress Ongoing; Progressing  -AC     LTG 2 Pt is able to complete ADLs and work activities without increased R shoulder pain.  -AC     LTG 2 Progress Not Met; Ongoing; Progressing  -AC     LTG 3 Pt is able to care for daughter without increased R shoulder pain.  -AC     LTG 3 Progress Met  -AC     LTG 4 Pt demo's no posterior shoulder or UT tension/guarding.  -AC     LTG 4 Progress Ongoing; Progressing  -AC     LTG 5 QuickDASH decreased to </= 25.  -AC     LTG 5 Progress Ongoing  -            Time Calculation    PT Goal Re-Cert Due Date 03/03/22  -           User Key  (r) = Recorded By, (t) = Taken By, (c) = Cosigned By    Initials Name Provider Type    Anitha Wong, PT Physical Therapist                               Time Calculation:   Start Time: 0800  Stop Time: 0843  Time Calculation (min): 43 min  Timed Charges  14549 - PT Therapeutic Exercise Minutes: 43  Total Minutes  Timed Charges Total Minutes: 43   Total Minutes: 43  Therapy Charges for Today     Code Description Service Date Service Provider Modifiers Qty    40292854041 HC PT THER PROC EA 15 MIN 2/24/2022 Anitha Gonzalez, PT GP 3                    Anitha Gonzalez, PT  2/24/2022

## 2022-02-28 ENCOUNTER — HOSPITAL ENCOUNTER (OUTPATIENT)
Dept: PHYSICAL THERAPY | Facility: HOSPITAL | Age: 27
Setting detail: THERAPIES SERIES
Discharge: HOME | End: 2022-02-28

## 2022-02-28 DIAGNOSIS — M75.41 SHOULDER IMPINGEMENT SYNDROME, RIGHT: Primary | ICD-10-CM

## 2022-02-28 PROCEDURE — 97110 THERAPEUTIC EXERCISES: CPT

## 2022-03-03 ENCOUNTER — DOCUMENTATION (OUTPATIENT)
Dept: PHYSICAL THERAPY | Facility: HOSPITAL | Age: 27
End: 2022-03-03

## 2022-03-03 ENCOUNTER — APPOINTMENT (OUTPATIENT)
Dept: PHYSICAL THERAPY | Facility: HOSPITAL | Age: 27
End: 2022-03-03

## 2022-03-07 ENCOUNTER — APPOINTMENT (OUTPATIENT)
Dept: PHYSICAL THERAPY | Facility: HOSPITAL | Age: 27
End: 2022-03-07

## 2022-03-08 DIAGNOSIS — K21.9 GASTROESOPHAGEAL REFLUX DISEASE: ICD-10-CM

## 2022-03-09 ENCOUNTER — TELEPHONE (OUTPATIENT)
Dept: FAMILY MEDICINE CLINIC | Facility: CLINIC | Age: 27
End: 2022-03-09

## 2022-03-09 RX ORDER — LEVOTHYROXINE SODIUM 0.03 MG/1
25 TABLET ORAL DAILY
Qty: 60 TABLET | Refills: 0 | Status: SHIPPED | OUTPATIENT
Start: 2022-03-09 | End: 2022-05-02

## 2022-03-09 RX ORDER — FAMOTIDINE 20 MG/1
TABLET, FILM COATED ORAL
Qty: 60 TABLET | Refills: 4 | Status: SHIPPED | OUTPATIENT
Start: 2022-03-09 | End: 2022-09-22

## 2022-03-09 NOTE — TELEPHONE ENCOUNTER
----- Message from Carlito Vizcarra PharmD sent at 3/9/2022  8:53 AM CST -----  Patient is overdue for follow up with PCP

## 2022-03-10 ENCOUNTER — APPOINTMENT (OUTPATIENT)
Dept: PHYSICAL THERAPY | Facility: HOSPITAL | Age: 27
End: 2022-03-10

## 2022-03-11 DIAGNOSIS — M75.41 SHOULDER IMPINGEMENT SYNDROME, RIGHT: Primary | ICD-10-CM

## 2022-03-11 RX ORDER — TIZANIDINE HYDROCHLORIDE 2 MG/1
2 CAPSULE, GELATIN COATED ORAL NIGHTLY PRN
Qty: 14 CAPSULE | Refills: 0 | Status: SHIPPED | OUTPATIENT
Start: 2022-03-11 | End: 2022-03-25

## 2022-03-11 NOTE — PROGRESS NOTES
Patient has appointment coming up with regards to shoulder pain. Oral and topical NSAIDs are not helping. Patient requested a muscle relaxer to help with shoulder pain, that way she can sleep, at least until her next appointment in 2 weeks. She had previously tried Zanaflex 2 mg nightly which worked well for her; will refill this medicine for 2 weeks           PGY-3  [facility]  This document has been electronically signed by Levy Zelaya MD on March 11, 2022 16:50 CST

## 2022-03-13 ENCOUNTER — HOSPITAL ENCOUNTER (EMERGENCY)
Facility: HOSPITAL | Age: 27
Discharge: HOME | End: 2022-03-13
Attending: EMERGENCY MEDICINE | Admitting: EMERGENCY MEDICINE

## 2022-03-13 VITALS
DIASTOLIC BLOOD PRESSURE: 88 MMHG | TEMPERATURE: 97.7 F | WEIGHT: 183 LBS | HEART RATE: 85 BPM | HEIGHT: 63 IN | OXYGEN SATURATION: 97 % | SYSTOLIC BLOOD PRESSURE: 165 MMHG | RESPIRATION RATE: 18 BRPM | BODY MASS INDEX: 32.43 KG/M2

## 2022-03-13 DIAGNOSIS — M25.511 ACUTE PAIN OF RIGHT SHOULDER: Primary | ICD-10-CM

## 2022-03-13 PROCEDURE — 96372 THER/PROPH/DIAG INJ SC/IM: CPT

## 2022-03-13 PROCEDURE — 99282 EMERGENCY DEPT VISIT SF MDM: CPT

## 2022-03-13 PROCEDURE — 25010000002 KETOROLAC TROMETHAMINE PER 15 MG: Performed by: NURSE PRACTITIONER

## 2022-03-13 PROCEDURE — 25010000002 TRIAMCINOLONE PER 10 MG: Performed by: NURSE PRACTITIONER

## 2022-03-13 RX ORDER — TRIAMCINOLONE ACETONIDE 40 MG/ML
40 INJECTION, SUSPENSION INTRA-ARTICULAR; INTRAMUSCULAR ONCE
Status: COMPLETED | OUTPATIENT
Start: 2022-03-13 | End: 2022-03-13

## 2022-03-13 RX ORDER — KETOROLAC TROMETHAMINE 30 MG/ML
30 INJECTION, SOLUTION INTRAMUSCULAR; INTRAVENOUS ONCE AS NEEDED
Status: COMPLETED | OUTPATIENT
Start: 2022-03-13 | End: 2022-03-13

## 2022-03-13 RX ADMIN — TRIAMCINOLONE ACETONIDE 40 MG: 40 INJECTION, SUSPENSION INTRA-ARTICULAR; INTRAMUSCULAR at 16:25

## 2022-03-13 RX ADMIN — KETOROLAC TROMETHAMINE 30 MG: 30 INJECTION, SOLUTION INTRAMUSCULAR; INTRAVENOUS at 16:25

## 2022-03-13 NOTE — ED PROVIDER NOTES
Subjective   Patient in the emergency department today complaining of right shoulder pain.  She reports he saw her primary back in December for the same complaint and has been in physical therapy.  She is tried muscle relaxers and NSAIDs for the pain and continued physical therapy.  She recently moved and she thinks she aggravated her shoulder with moving furniture painting etc.  Onset of complaint 3 days.      History provided by:  Patient   used: No        Review of Systems   Constitutional: Negative for chills.   HENT: Negative for congestion.    Respiratory: Negative for shortness of breath.    Cardiovascular: Negative for chest pain and palpitations.   Gastrointestinal: Negative for abdominal pain, diarrhea, nausea and vomiting.   Genitourinary: Negative for flank pain.   Musculoskeletal: Positive for arthralgias.   Skin: Negative for wound.   Allergic/Immunologic: Negative for immunocompromised state.   Neurological: Negative for weakness.   Hematological: Negative for adenopathy.   Psychiatric/Behavioral: Negative for confusion.   All other systems reviewed and are negative.      Past Medical History:   Diagnosis Date   • Anxiety    • Personal history of cardiac arrhythmia    • SVT (supraventricular tachycardia) ([facility])     s/p ablation November 2015       Allergies   Allergen Reactions   • Tamiflu [Oseltamivir Phosphate] Rash   • Vancomycin Itching       Past Surgical History:   Procedure Laterality Date   • ANKLE SURGERY  12/01/2010   • APPENDECTOMY  11/2012   • CARDIAC ABLATION  11/2015    for SVT       Family History   Problem Relation Age of Onset   • Depression Mother    • Hypertension Father    • No Known Problems Brother    • No Known Problems Sister    • Uterine cancer Paternal Grandmother    • No Known Problems Maternal Grandmother    • Colon cancer Maternal Grandfather        Social History     Socioeconomic History   • Marital status:    Tobacco Use   • Smoking status:  Never Smoker   • Smokeless tobacco: Never Used   Vaping Use   • Vaping Use: Never used   Substance and Sexual Activity   • Alcohol use: No   • Drug use: No   • Sexual activity: Yes     Partners: Male     Comment: last pap smear 9/27/18 negative            Objective   Physical Exam  Vitals and nursing note reviewed.   Constitutional:       Appearance: She is well-developed.   HENT:      Head: Normocephalic.      Nose: Nose normal.   Eyes:      Conjunctiva/sclera: Conjunctivae normal.      Pupils: Pupils are equal, round, and reactive to light.   Cardiovascular:      Rate and Rhythm: Normal rate and regular rhythm.      Heart sounds: Normal heart sounds.   Pulmonary:      Effort: Pulmonary effort is normal.      Breath sounds: Normal breath sounds.   Abdominal:      Palpations: Abdomen is soft.   Musculoskeletal:         General: Tenderness and signs of injury present.      Right shoulder: Decreased range of motion.      Cervical back: Normal range of motion.   Skin:     General: Skin is warm and dry.   Neurological:      Mental Status: She is alert and oriented to person, place, and time.      GCS: GCS eye subscore is 4. GCS verbal subscore is 5. GCS motor subscore is 6.         Procedures           ED Course      toradol and kenalog IM injections in ED. OTC nsaids for pain. FOllow with pcp and ortho                                            MDM    Final diagnoses:   Acute pain of right shoulder       ED Disposition  ED Disposition     ED Disposition   Discharge    Condition   Stable    Comment   --             Levy Zelaya MD  [address]   [place] [address] [phone]    Call in 1 week      Constance Bonilla APRN  [address]  [facility] [address] [phone]    Call in 1 week           Medication List      No changes were made to your prescriptions during this visit.          Kemar Kevin, JOHANA  03/13/22 8153

## 2022-03-14 ENCOUNTER — APPOINTMENT (OUTPATIENT)
Dept: PHYSICAL THERAPY | Facility: HOSPITAL | Age: 27
End: 2022-03-14

## 2022-03-17 ENCOUNTER — APPOINTMENT (OUTPATIENT)
Dept: PHYSICAL THERAPY | Facility: HOSPITAL | Age: 27
End: 2022-03-17

## 2022-03-21 ENCOUNTER — TELEPHONE (OUTPATIENT)
Dept: FAMILY MEDICINE CLINIC | Facility: CLINIC | Age: 27
End: 2022-03-21

## 2022-03-21 DIAGNOSIS — M75.41 SHOULDER IMPINGEMENT SYNDROME, RIGHT: Primary | ICD-10-CM

## 2022-03-22 ENCOUNTER — TELEPHONE (OUTPATIENT)
Dept: FAMILY MEDICINE CLINIC | Facility: CLINIC | Age: 27
End: 2022-03-22

## 2022-03-23 ENCOUNTER — OFFICE VISIT (OUTPATIENT)
Dept: ORTHOPEDIC SURGERY | Facility: CLINIC | Age: 27
End: 2022-03-23

## 2022-03-23 VITALS — HEIGHT: 63 IN | WEIGHT: 189.6 LBS | BODY MASS INDEX: 33.59 KG/M2

## 2022-03-23 DIAGNOSIS — G89.29 CHRONIC RIGHT SHOULDER PAIN: Primary | ICD-10-CM

## 2022-03-23 DIAGNOSIS — M25.511 CHRONIC RIGHT SHOULDER PAIN: Primary | ICD-10-CM

## 2022-03-23 PROCEDURE — 99214 OFFICE O/P EST MOD 30 MIN: CPT | Performed by: NURSE PRACTITIONER

## 2022-03-23 RX ORDER — MELOXICAM 7.5 MG/1
7.5 TABLET ORAL DAILY
Qty: 30 TABLET | Refills: 1 | Status: SHIPPED | OUTPATIENT
Start: 2022-03-23 | End: 2022-05-23

## 2022-03-23 RX ORDER — IBUPROFEN 800 MG/1
800 TABLET ORAL EVERY 6 HOURS PRN
COMMUNITY
End: 2022-03-23

## 2022-03-23 NOTE — PROGRESS NOTES
"Yumiko Lopez is a 26 y.o. female   Primary provider:  Levy Zelaya MD       Chief Complaint   Patient presents with   • Right Shoulder - Pain   • Establish Care       HISTORY OF PRESENT ILLNESS: This 26-year-old female patient presents today for chronic shoulder pain that radiates to her bicep.  The patient reports her pain started approximately a year and a half ago and has been intermittent.  The patient reports she attended physical therapy for approximately 6 weeks at the end of the year into January 2022.  Patient reports her pain did improve however they recently bought a new home and she has been painting and has a 8-month-old baby.  Patient reports approximately 2 weeks ago she was seen in the ER where she received a Toradol and a steroid IM injection because her pain has gotten significantly worse.  The patient states \"I do not know if it is from where we were painting and moving or like\".  The patient denies numbness or tingling.  Patient denies fever or chills.  The patient reports decreased strength and mobility.  She states \"it is so painful to wash my hair\".  The patient had an x-ray in December 1, 2021.  The x-ray was negative for acute findings or degenerative changes.    Pain  This is a chronic problem. The current episode started more than 1 year ago (1 year 6 months). The problem occurs constantly. Associated symptoms comments: Stabbing, aching, clicking. Exacerbated by: driving. She has tried acetaminophen, NSAIDs, ice and heat for the symptoms.        CONCURRENT MEDICAL HISTORY:    Past Medical History:   Diagnosis Date   • Anxiety    • Colitis    • Depression    • Personal history of cardiac arrhythmia    • SVT (supraventricular tachycardia) ([facility])     s/p ablation November 2015   • Thyroid dysfunction        Allergies   Allergen Reactions   • Tamiflu [Oseltamivir Phosphate] Rash   • Vancomycin Itching         Current Outpatient Medications:   •  famotidine (PEPCID) 20 MG tablet, " "TAKE 1 TABLET BY MOUTH TWICE DAILY, Disp: 60 tablet, Rfl: 4  •  Levonorgestrel-Ethinyl Estrad (LUTERA PO), Take  by mouth., Disp: , Rfl:   •  levothyroxine (SYNTHROID, LEVOTHROID) 25 MCG tablet, Take 1 tablet by mouth Daily., Disp: 60 tablet, Rfl: 0  •  ondansetron (ZOFRAN) 4 MG tablet, Take 1 tablet by mouth Every 6 (Six) Hours As Needed for Nausea or Vomiting., Disp: 30 tablet, Rfl: 2  •  TiZANidine (Zanaflex) 2 MG capsule, Take 1 capsule by mouth At Night As Needed for Muscle Spasms for up to 14 days., Disp: 14 capsule, Rfl: 0  •  meloxicam (Mobic) 7.5 MG tablet, Take 1 tablet by mouth Daily for 60 days., Disp: 30 tablet, Rfl: 1    Past Surgical History:   Procedure Laterality Date   • ANKLE SURGERY  12/01/2010   • APPENDECTOMY  11/2012   • CARDIAC ABLATION  11/2015    for SVT       Family History   Problem Relation Age of Onset   • Depression Mother    • Hypertension Father    • No Known Problems Sister    • No Known Problems Brother    • No Known Problems Maternal Grandmother    • Colon cancer Maternal Grandfather    • Uterine cancer Paternal Grandmother    • Deep vein thrombosis Other         Social History     Socioeconomic History   • Marital status:    Tobacco Use   • Smoking status: Never Smoker   • Smokeless tobacco: Never Used   Vaping Use   • Vaping Use: Never used   Substance and Sexual Activity   • Alcohol use: No   • Drug use: No   • Sexual activity: Yes     Partners: Male     Comment: last pap smear 9/27/18 negative         Review of Systems   Psychiatric/Behavioral: Positive for sleep disturbance.   All other systems reviewed and are negative.      PHYSICAL EXAMINATION:       Ht 160 cm (63\")   Wt 86 kg (189 lb 9.6 oz)   LMP 02/28/2022 (Approximate)   BMI 33.59 kg/m²     Physical Exam    GAIT:     [x]  Normal  []  Antalgic    Assistive device: [x]  None  []  Walker     []  Crutches  []  Cane     []  Wheelchair  []  Stretcher    Right Shoulder Exam     Tenderness   The patient is " experiencing tenderness in the acromioclavicular joint, acromion and biceps tendon (levator scapulae and trapezius muscle.).    Range of Motion   Active abduction: 150   Passive abduction: 160   Extension: normal   External rotation: abnormal     Tests   Cross arm: positive  Impingement: positive  Drop arm: positive    Other   Erythema: absent  Scars: absent  Sensation: normal  Pulse: present              No results found.    Procedure: Right    shoulder     INDICATION: Right shoulder pain   .     Technique: The    views.     Prior examination: None.     FINDINGS:  No evidence of acute bony, soft tissue, or joint  abnormality is noted. Bone mineralization is within normal  limits. The joint spaces appear intact. Normal right  acromioclavicular joint. Normal glenohumeral joint.     IMPRESSION:  Normal examination of the right    shoulder.     Electronically signed by:  Azar Calvillo MD  12/2/2021 3:04 PM [facility]  Workstation: [id number]    ASSESSMENT:    Diagnoses and all orders for this visit:    Chronic right shoulder pain  -     MRI Shoulder Right Without Contrast; Future  -     meloxicam (Mobic) 7.5 MG tablet; Take 1 tablet by mouth Daily for 60 days.      PLAN  Reviewed medical records including most recent visit to the ER, labs, x-rays, medication list and allergies.  Stop ibuprofen, start meloxicam.  Recommend starting a prescription oral NSAID for improved control of pain/inflammation. Meloxicam is prescribed today. Patient is instructed to take the medication daily. Patient is instructed to take the medication with food to minimize any potential GI upset. Patient is instructed not to take any additional NSAIDs, such as Ibuprofen or Aleve, while taking Meloxicam. Patient can also take Tylenol as needed for additional pain control.   Referral for PT sent to Sports medicine.   Modify activity based on pain.  Rest, ice and elevate as needed to reduce pain, swelling and inflammation.  We also discussed an intra-articular  shoulder injection.  We reviewed the risk and benefits of this injection.  The patient declined the injection at this time.  The patient wishes to have the MRI first and then will later address an intra-articular shoulder injection.  We also discussed topical analgesics such as Biofreeze, Salonpas and/or Voltaren gel.  Advised the patient she may try any of these but did not use them simultaneously as the cause skin burning, irritation or rash.    Return for schedule follow up after MRI is completed. .    JOHANA Rich      This document has been electronically signed by JOHANA Rich on March 23, 2022 16:08 CDT      EMR Dragon/Transciption Disclaimer: Some of this note may be an electronic transcription/translation of spoken language to printed text.  The electronic translation of spoken language may permit erroneous, or at times, nonsensical words or phrases to be inadvertently transcribed. Although I have reviewed the note for such errors, some may still exist.     Time spent of a minimum of 30 minutes including the face to face evaluation, reviewing of medical history and prior medial records, reviewing of diagnostic studies, ordering additional tests, documentation, patient education and coordination of care.

## 2022-03-28 DIAGNOSIS — F40.240 CLAUSTROPHOBIA: Primary | ICD-10-CM

## 2022-03-28 RX ORDER — DIAZEPAM 5 MG/1
5 TABLET ORAL ONCE
Qty: 1 TABLET | Refills: 0 | Status: SHIPPED | OUTPATIENT
Start: 2022-03-28 | End: 2022-03-28

## 2022-03-30 DIAGNOSIS — Z32.00 ENCOUNTER FOR PREGNANCY TEST, RESULT UNKNOWN: Primary | ICD-10-CM

## 2022-04-01 ENCOUNTER — HOSPITAL ENCOUNTER (OUTPATIENT)
Dept: MRI IMAGING | Facility: HOSPITAL | Age: 27
Discharge: HOME | End: 2022-04-01
Admitting: NURSE PRACTITIONER

## 2022-04-01 DIAGNOSIS — G89.29 CHRONIC RIGHT SHOULDER PAIN: ICD-10-CM

## 2022-04-01 DIAGNOSIS — M25.511 CHRONIC RIGHT SHOULDER PAIN: ICD-10-CM

## 2022-04-01 PROCEDURE — 73221 MRI JOINT UPR EXTREM W/O DYE: CPT

## 2022-04-04 ENCOUNTER — TELEPHONE (OUTPATIENT)
Dept: ORTHOPEDIC SURGERY | Facility: CLINIC | Age: 27
End: 2022-04-04

## 2022-04-04 NOTE — TELEPHONE ENCOUNTER
----- Message from JOHANA Rich sent at 4/4/2022 12:30 PM CDT -----  Please notify patient her MRI of her shoulder, right, was negative. NO abnormalities were noted.

## 2022-04-04 NOTE — TELEPHONE ENCOUNTER
RODRIGO      PATIENT RETURNING MISSED CALL RELAYED MRI TEST RESULTS PATIENT VOICE UNDERSTANDING PATIENT IS WANTING TO KNOW IF SHE NEEDS TO DO THERAPY OR AN INJECTION WHATEVER YOU THINK WOULD WORK BEST TO HELP HER PAIN      PLEASE ADVISE [phone]

## 2022-04-20 ENCOUNTER — OFFICE VISIT (OUTPATIENT)
Dept: OBSTETRICS AND GYNECOLOGY | Facility: CLINIC | Age: 27
End: 2022-04-20

## 2022-04-20 ENCOUNTER — LAB (OUTPATIENT)
Dept: LAB | Facility: HOSPITAL | Age: 27
End: 2022-04-20

## 2022-04-20 VITALS
DIASTOLIC BLOOD PRESSURE: 60 MMHG | HEIGHT: 63 IN | WEIGHT: 190 LBS | SYSTOLIC BLOOD PRESSURE: 102 MMHG | BODY MASS INDEX: 33.66 KG/M2

## 2022-04-20 DIAGNOSIS — F32.A DEPRESSION, UNSPECIFIED DEPRESSION TYPE: ICD-10-CM

## 2022-04-20 DIAGNOSIS — E03.9 HYPOTHYROIDISM (ACQUIRED): ICD-10-CM

## 2022-04-20 DIAGNOSIS — Z32.00 PREGNANCY EXAMINATION OR TEST, PREGNANCY UNCONFIRMED: ICD-10-CM

## 2022-04-20 DIAGNOSIS — N92.1 BREAKTHROUGH BLEEDING ON BIRTH CONTROL PILLS: Primary | ICD-10-CM

## 2022-04-20 LAB
B-HCG UR QL: NEGATIVE
EXPIRATION DATE: 0
INTERNAL NEGATIVE CONTROL: NEGATIVE
INTERNAL POSITIVE CONTROL: POSITIVE
Lab: 0
T4 FREE SERPL-MCNC: 1.57 NG/DL (ref 0.93–1.7)
TSH SERPL DL<=0.05 MIU/L-ACNC: 2.07 UIU/ML (ref 0.27–4.2)

## 2022-04-20 PROCEDURE — 36415 COLL VENOUS BLD VENIPUNCTURE: CPT | Performed by: NURSE PRACTITIONER

## 2022-04-20 PROCEDURE — 99214 OFFICE O/P EST MOD 30 MIN: CPT | Performed by: NURSE PRACTITIONER

## 2022-04-20 PROCEDURE — 81025 URINE PREGNANCY TEST: CPT | Performed by: NURSE PRACTITIONER

## 2022-04-20 PROCEDURE — 84439 ASSAY OF FREE THYROXINE: CPT | Performed by: NURSE PRACTITIONER

## 2022-04-20 PROCEDURE — 84443 ASSAY THYROID STIM HORMONE: CPT | Performed by: NURSE PRACTITIONER

## 2022-04-20 RX ORDER — TIZANIDINE 2 MG/1
TABLET ORAL
COMMUNITY
Start: 2022-03-11 | End: 2022-06-01 | Stop reason: SDUPTHER

## 2022-04-20 RX ORDER — LEVONORGESTREL AND ETHINYL ESTRADIOL 0.15-0.03
1 KIT ORAL DAILY
Qty: 28 TABLET | Refills: 12 | Status: SHIPPED | OUTPATIENT
Start: 2022-04-20 | End: 2023-04-20

## 2022-04-20 RX ORDER — LEVONORGESTREL AND ETHINYL ESTRADIOL 0.1-0.02MG
KIT ORAL
COMMUNITY
Start: 2022-03-26 | End: 2022-04-20

## 2022-04-20 NOTE — PROGRESS NOTES
"Subjective   Yumiko Lopez is a 27 y.o. here for for bleeding on birth control pills    Yumiko oLpez is a 27 yr old premenopausal female who presents today for breakthrough bleeding with her birth control pill. She is currently on her 4th pack; she has done well the first 3 months but she has been spotting and now bleeding more this month. Currently, on the 3rd week of the pack. Has been taking the pills consistently. Pt is 9 months postpartum, \"possibly has hypothyroidism\" and currently, taking 25 mcg synthroid daily. Last TSH was done 8 months ago and pt had to decreased the amount of synthroid she was taking. Endorses that she has some postpartum depression and had only wanted the lowest dose of BC. States that she had reservations on starting BC because the last time she was on it, she became \"suicidal.\" Voices that she may want an IUD, but not at this time.       The following portions of the patient's history were reviewed and updated as appropriate: allergies, current medications, past family history, past medical history, past social history, past surgical history and problem list.    Review of Systems   Constitutional: Negative for chills, fatigue and fever.   Respiratory: Negative for shortness of breath.    Cardiovascular: Negative for chest pain and palpitations.   Gastrointestinal: Negative for abdominal pain, constipation, diarrhea and nausea.   Genitourinary: Positive for menstrual problem. Negative for dysuria, frequency, pelvic pressure, vaginal bleeding, vaginal discharge and vaginal pain.   Skin: Negative for rash.   Neurological: Negative for headache.   Psychiatric/Behavioral: Positive for depressed mood.       Objective   Physical Exam  Vitals and nursing note reviewed.   Constitutional:       Appearance: She is well-developed.   HENT:      Head: Normocephalic.   Cardiovascular:      Rate and Rhythm: Normal rate and regular rhythm.   Pulmonary:      Effort: Pulmonary effort is normal. "   Musculoskeletal:         General: Normal range of motion.      Cervical back: Normal range of motion.   Skin:     General: Skin is warm and dry.   Neurological:      Mental Status: She is alert and oriented to person, place, and time.   Psychiatric:         Behavior: Behavior normal.           Assessment/Plan   Diagnoses and all orders for this visit:    1. Breakthrough bleeding on birth control pills (Primary)  -     levonorgestrel-ethinyl estradiol (NORDETTE) 0.15-30 MG-MCG per tablet; Take 1 tablet by mouth Daily.  Dispense: 28 tablet; Refill: 12  -     TSH+Free T4    2. Depression, unspecified depression type  -     sertraline (Zoloft) 50 MG tablet; Take 1 tablet by mouth Daily.  Dispense: 30 tablet; Refill: 2    3. Hypothyroidism (acquired)  -     TSH+Free T4    4. Pregnancy examination or test, pregnancy unconfirmed  -     POC Pregnancy, Urine        Negative UPT today. Discussed possible breakthrough bleeding from the pill as the cause but also discussed that thryoid issues can cause menstrual problems. Offered the option of discontinuing the pill to see how her menses are without BC or increased estrogen dosage on the pill to help regulate cycles. Pt desires to try a different BC at this time but also wants to start on an antidepressant as she feels she already has postpartum depression and is worried about the side effect of increasing BC dosage. Will start Zoloft today and counseled patient to follow-up with PCP for worsening depression. Also, counseled to reschedule well woman/pap test ASAP.

## 2022-05-02 RX ORDER — LEVOTHYROXINE SODIUM 0.03 MG/1
TABLET ORAL
Qty: 60 TABLET | Refills: 0 | Status: SHIPPED | OUTPATIENT
Start: 2022-05-02 | End: 2022-06-08

## 2022-05-23 DIAGNOSIS — M25.511 CHRONIC RIGHT SHOULDER PAIN: ICD-10-CM

## 2022-05-23 DIAGNOSIS — G89.29 CHRONIC RIGHT SHOULDER PAIN: ICD-10-CM

## 2022-05-23 RX ORDER — MELOXICAM 7.5 MG/1
7.5 TABLET ORAL DAILY
Qty: 30 TABLET | Refills: 1 | Status: SHIPPED | OUTPATIENT
Start: 2022-05-23 | End: 2022-07-22

## 2022-05-23 RX ORDER — MELOXICAM 7.5 MG/1
TABLET ORAL
Qty: 30 TABLET | Refills: 0 | Status: SHIPPED | OUTPATIENT
Start: 2022-05-23 | End: 2022-05-23

## 2022-06-01 ENCOUNTER — OFFICE VISIT (OUTPATIENT)
Dept: FAMILY MEDICINE CLINIC | Facility: CLINIC | Age: 27
End: 2022-06-01

## 2022-06-01 ENCOUNTER — LAB (OUTPATIENT)
Dept: LAB | Facility: HOSPITAL | Age: 27
End: 2022-06-01

## 2022-06-01 ENCOUNTER — OFFICE VISIT (OUTPATIENT)
Dept: OBSTETRICS AND GYNECOLOGY | Facility: CLINIC | Age: 27
End: 2022-06-01

## 2022-06-01 VITALS
BODY MASS INDEX: 33.56 KG/M2 | TEMPERATURE: 96.9 F | WEIGHT: 189.4 LBS | SYSTOLIC BLOOD PRESSURE: 120 MMHG | HEIGHT: 63 IN | DIASTOLIC BLOOD PRESSURE: 90 MMHG | OXYGEN SATURATION: 99 % | HEART RATE: 89 BPM

## 2022-06-01 VITALS
BODY MASS INDEX: 33.66 KG/M2 | WEIGHT: 190 LBS | HEIGHT: 63 IN | SYSTOLIC BLOOD PRESSURE: 112 MMHG | DIASTOLIC BLOOD PRESSURE: 70 MMHG

## 2022-06-01 DIAGNOSIS — Z32.00 ENCOUNTER FOR PREGNANCY TEST, RESULT UNKNOWN: ICD-10-CM

## 2022-06-01 DIAGNOSIS — M25.511 CHRONIC RIGHT SHOULDER PAIN: ICD-10-CM

## 2022-06-01 DIAGNOSIS — G89.29 CHRONIC RIGHT SHOULDER PAIN: ICD-10-CM

## 2022-06-01 DIAGNOSIS — Z12.4 ENCOUNTER FOR PAPANICOLAOU SMEAR FOR CERVICAL CANCER SCREENING: ICD-10-CM

## 2022-06-01 DIAGNOSIS — Z00.00 HEALTHCARE MAINTENANCE: Primary | ICD-10-CM

## 2022-06-01 DIAGNOSIS — Z00.00 HEALTHCARE MAINTENANCE: ICD-10-CM

## 2022-06-01 DIAGNOSIS — Z01.419 ENCOUNTER FOR WELL WOMAN EXAM WITH ROUTINE GYNECOLOGICAL EXAM: Primary | ICD-10-CM

## 2022-06-01 PROCEDURE — 83735 ASSAY OF MAGNESIUM: CPT

## 2022-06-01 PROCEDURE — 99213 OFFICE O/P EST LOW 20 MIN: CPT | Performed by: STUDENT IN AN ORGANIZED HEALTH CARE EDUCATION/TRAINING PROGRAM

## 2022-06-01 PROCEDURE — 2014F MENTAL STATUS ASSESS: CPT | Performed by: NURSE PRACTITIONER

## 2022-06-01 PROCEDURE — 85025 COMPLETE CBC W/AUTO DIFF WBC: CPT

## 2022-06-01 PROCEDURE — 36415 COLL VENOUS BLD VENIPUNCTURE: CPT

## 2022-06-01 PROCEDURE — 84702 CHORIONIC GONADOTROPIN TEST: CPT

## 2022-06-01 PROCEDURE — 99395 PREV VISIT EST AGE 18-39: CPT | Performed by: NURSE PRACTITIONER

## 2022-06-01 PROCEDURE — 80053 COMPREHEN METABOLIC PANEL: CPT

## 2022-06-01 RX ORDER — TIZANIDINE 2 MG/1
2 TABLET ORAL EVERY 8 HOURS PRN
Qty: 32 TABLET | Refills: 0 | Status: SHIPPED | OUTPATIENT
Start: 2022-06-01 | End: 2022-07-12

## 2022-06-01 NOTE — PROGRESS NOTES
Family Medicine Residency  Levy Zelaya MD    Subjective:     Yumiko Lopez is a 27 y.o. female who presents for:     Healthcare maintenance: BMI of 33.5.  Has not completed labs since July 2021 (gave birth and also had COVID at that time).  Had postpartum depression thereafter which has been well controlled on Zoloft 50 mg daily.  Has been compliant with her Synthroid 25 MCG.    Right-sided shoulder pain/spasms: Has been taking Mobic daily without much improvement in the right-sided shoulder pain.  Recent MRI of the shoulder was unremarkable.  Previously completed physical therapy which has not helped.  Has never received any shoulder injections.  Does not use the Voltaren gel because she feels it does not help.  Rarely uses muscle relaxer except when she is in severe pain.    The following portions of the patient's history were reviewed and updated as appropriate: allergies, current medications, past family history, past medical history, past social history, past surgical history and problem list.    Past Medical Hx:  Past Medical History:   Diagnosis Date   • Anxiety    • Colitis    • Depression    • Personal history of cardiac arrhythmia    • SVT (supraventricular tachycardia) (HCC)     s/p ablation November 2015   • Thyroid dysfunction        Past Surgical Hx:  Past Surgical History:   Procedure Laterality Date   • ANKLE SURGERY  12/01/2010   • APPENDECTOMY  11/2012   • CARDIAC ABLATION  11/2015    for SVT       Current Meds:    Current Outpatient Medications:   •  famotidine (PEPCID) 20 MG tablet, TAKE 1 TABLET BY MOUTH TWICE DAILY, Disp: 60 tablet, Rfl: 4  •  levonorgestrel-ethinyl estradiol (NORDETTE) 0.15-30 MG-MCG per tablet, Take 1 tablet by mouth Daily., Disp: 28 tablet, Rfl: 12  •  levothyroxine (SYNTHROID, LEVOTHROID) 25 MCG tablet, TAKE 1 TABLET EVERY DAY, Disp: 60 tablet, Rfl: 0  •  meloxicam (Mobic) 7.5 MG tablet, Take 1 tablet by mouth Daily for 60 days., Disp: 30 tablet, Rfl: 1  •   ondansetron (ZOFRAN) 4 MG tablet, Take 1 tablet by mouth Every 6 (Six) Hours As Needed for Nausea or Vomiting., Disp: 30 tablet, Rfl: 2  •  sertraline (Zoloft) 50 MG tablet, Take 1 tablet by mouth Daily., Disp: 30 tablet, Rfl: 2  •  tiZANidine (ZANAFLEX) 2 MG tablet, Take 1 tablet by mouth Every 8 (Eight) Hours As Needed for Muscle Spasms for up to 14 days., Disp: 32 tablet, Rfl: 0    Allergies:  Allergies   Allergen Reactions   • Tamiflu [Oseltamivir Phosphate] Rash   • Vancomycin Itching       Family Hx:  Family History   Problem Relation Age of Onset   • Depression Mother    • Hypertension Father    • No Known Problems Sister    • No Known Problems Brother    • No Known Problems Maternal Grandmother    • Colon cancer Maternal Grandfather    • Uterine cancer Paternal Grandmother    • Deep vein thrombosis Other         Social History:  Social History     Socioeconomic History   • Marital status:    Tobacco Use   • Smoking status: Never Smoker   • Smokeless tobacco: Never Used   Vaping Use   • Vaping Use: Never used   Substance and Sexual Activity   • Alcohol use: No   • Drug use: No   • Sexual activity: Yes     Partners: Male     Comment: last pap smear 9/27/18 negative        Review of Systems  Review of Systems   Constitutional: Negative for chills and fever.   HENT: Negative for facial swelling and trouble swallowing.    Eyes: Negative for photophobia and visual disturbance.   Respiratory: Negative for apnea, chest tightness and shortness of breath.    Cardiovascular: Negative for chest pain.   Gastrointestinal: Negative for abdominal distention, diarrhea, nausea and vomiting.   Genitourinary: Negative for pelvic pain.   Musculoskeletal: Negative for arthralgias and myalgias.        R shoulder pain/spasms   Neurological: Negative for seizures and speech difficulty.   Psychiatric/Behavioral: Negative for confusion and hallucinations.       Objective:     /90   Pulse 89   Temp 96.9 °F (36.1 °C)    "Ht 160 cm (63\")   Wt 85.9 kg (189 lb 6.4 oz)   LMP 05/18/2022   SpO2 99%   BMI 33.55 kg/m²   Physical Exam  Constitutional:       Appearance: She is well-developed.   HENT:      Head: Normocephalic and atraumatic.      Nose: Nose normal.   Eyes:      Conjunctiva/sclera: Conjunctivae normal.      Pupils: Pupils are equal, round, and reactive to light.   Cardiovascular:      Rate and Rhythm: Normal rate and regular rhythm.      Pulses: Normal pulses.      Heart sounds: Normal heart sounds.   Pulmonary:      Effort: Pulmonary effort is normal. No respiratory distress.      Breath sounds: Normal breath sounds.   Abdominal:      General: Abdomen is flat. Bowel sounds are normal. There is no distension.      Palpations: Abdomen is soft.   Musculoskeletal:         General: Normal range of motion.      Right shoulder: Normal range of motion. Normal strength.      Cervical back: Normal range of motion and neck supple.   Skin:     General: Skin is warm and dry.   Neurological:      General: No focal deficit present.      Mental Status: She is alert and oriented to person, place, and time. Mental status is at baseline.      Cranial Nerves: No cranial nerve deficit.   Psychiatric:         Mood and Affect: Mood normal.         Behavior: Behavior normal.          Assessment/Plan:     Diagnoses and all orders for this visit:    1. Healthcare maintenance (Primary)  -     CBC w AUTO Differential; Future  -     Comprehensive metabolic panel; Future  -     Magnesium; Future    2. Chronic right shoulder pain  -     tiZANidine (ZANAFLEX) 2 MG tablet; Take 1 tablet by mouth Every 8 (Eight) Hours As Needed for Muscle Spasms for up to 14 days.  Dispense: 32 tablet; Refill: 0      -We will complete CBC, CMP, Mag to check for anemia, WBC count, kidney function, electrolyte abnormalities   -Counseled patient to cut back on the Mobic for now as it can be nephrotoxic when used chronically.  Use it only as needed.  Encourage patient to focus " on Zanaflex as needed and to regularly apply Voltaren gel.  She declined physical therapy for now for right shoulder pain.  We can consider shoulder injection at a future visit.     Follow-up:     Return in about 3 months (around 9/1/2022) for Next scheduled follow up.    Preventative:  Health Maintenance   Topic Date Due   • ANNUAL PHYSICAL  Never done   • COVID-19 Vaccine (2 - Moderna series) 08/31/2021   • PAP SMEAR  09/27/2021   • INFLUENZA VACCINE  08/01/2022   • TDAP/TD VACCINES (3 - Td or Tdap) 05/05/2031   • HEPATITIS C SCREENING  Completed   • HPV VACCINES  Completed   • Pneumococcal Vaccine 0-64  Aged Out       Alcohol use:  reports no history of alcohol use.  Nicotine status  reports that she has never smoked. She has never used smokeless tobacco.     Goals     •  Reduce fat intake/eat healthier (pt-stated)       Barriers:  Likes [employer], but will try to eat it less frequently, e.g. Once every 2 weeks instead of weekly              RISK SCORE: 3          PGY-3  [facility] Family Medicine Residency  This document has been electronically signed by Levy Zelaya MD on June 1, 2022 16:32 CDT

## 2022-06-01 NOTE — PROGRESS NOTES
Subjective   Yumiko Lopez is a 27 y.o. here for gyn annual    Yumiko Lopez is a 27 yr old  who presents today for her well woman. Last Pap 2018, NIL. Doing well on Nordette, no breakthrough bleeding. Denies any concerns.     Gynecologic Exam  The patient's pertinent negatives include no genital itching, genital lesions, genital odor, genital rash, missed menses, pelvic pain, vaginal bleeding or vaginal discharge. Pertinent negatives include no abdominal pain, chills, constipation, diarrhea, dysuria, fever, frequency, nausea or rash. She is sexually active. No, her partner does not have an STD. She uses oral contraceptives for contraception.       The following portions of the patient's history were reviewed and updated as appropriate: allergies, current medications, past family history, past medical history, past social history, past surgical history and problem list.    Review of Systems   Constitutional: Negative for chills, fatigue, fever, unexpected weight gain and unexpected weight loss.   Respiratory: Negative for shortness of breath.    Cardiovascular: Negative for chest pain and palpitations.   Gastrointestinal: Negative for abdominal pain, constipation, diarrhea and nausea.   Endocrine: Negative for cold intolerance and heat intolerance.   Genitourinary: Negative for amenorrhea, breast discharge, breast lump, breast pain, difficulty urinating, dysuria, frequency, menstrual problem, missed menses, pelvic pain, pelvic pressure, urinary incontinence, vaginal bleeding, vaginal discharge and vaginal pain.   Skin: Negative for rash.   Neurological: Negative for headache.   Psychiatric/Behavioral: Negative for sleep disturbance, depressed mood and stress.       Objective   Physical Exam  Vitals and nursing note reviewed.   Constitutional:       Appearance: She is well-developed.   HENT:      Head: Normocephalic and atraumatic.   Neck:      Thyroid: No thyromegaly.   Cardiovascular:      Rate and  Rhythm: Normal rate and regular rhythm.      Heart sounds: Normal heart sounds.   Pulmonary:      Effort: Pulmonary effort is normal. No respiratory distress.      Breath sounds: Normal breath sounds.   Chest:      Comments: Declined breast exam.   Abdominal:      General: There is no distension.      Palpations: Abdomen is soft.      Tenderness: There is no abdominal tenderness.   Genitourinary:     General: Normal vulva.      Vagina: Normal.      Rectum: Normal.      Comments: Pap collected. Bimanual exam deferred. Pt w/o complaints.   Musculoskeletal:         General: Normal range of motion.      Cervical back: Normal range of motion and neck supple.   Skin:     General: Skin is warm and dry.   Neurological:      Mental Status: She is alert and oriented to person, place, and time.   Psychiatric:         Behavior: Behavior normal.           Assessment & Plan   Diagnoses and all orders for this visit:    1. Encounter for well woman exam with routine gynecological exam (Primary)    2. Encounter for Papanicolaou smear for cervical cancer screening  -     LIQUID-BASED PAP SMEAR, [facility] ([facility])        Reviewed pap screening guidelines and breat awareness. Return as needed or in one year for refills and gyn annual. F/u with PCP for her management of depression with Zoloft.

## 2022-06-02 LAB
ALBUMIN SERPL-MCNC: 4.7 G/DL (ref 3.5–5.2)
ALBUMIN/GLOB SERPL: 1.9 G/DL
ALP SERPL-CCNC: 56 U/L (ref 39–117)
ALT SERPL W P-5'-P-CCNC: 29 U/L (ref 1–33)
ANION GAP SERPL CALCULATED.3IONS-SCNC: 10.5 MMOL/L (ref 5–15)
AST SERPL-CCNC: 23 U/L (ref 1–32)
BASOPHILS # BLD AUTO: 0.05 10*3/MM3 (ref 0–0.2)
BASOPHILS NFR BLD AUTO: 0.4 % (ref 0–1.5)
BILIRUB SERPL-MCNC: <0.2 MG/DL (ref 0–1.2)
BUN SERPL-MCNC: 18 MG/DL (ref 6–20)
BUN/CREAT SERPL: 28.1 (ref 7–25)
CALCIUM SPEC-SCNC: 9.6 MG/DL (ref 8.6–10.5)
CHLORIDE SERPL-SCNC: 107 MMOL/L (ref 98–107)
CO2 SERPL-SCNC: 24.5 MMOL/L (ref 22–29)
CREAT SERPL-MCNC: 0.64 MG/DL (ref 0.57–1)
DEPRECATED RDW RBC AUTO: 39.3 FL (ref 37–54)
EGFRCR SERPLBLD CKD-EPI 2021: 125.2 ML/MIN/1.73
EOSINOPHIL # BLD AUTO: 0.09 10*3/MM3 (ref 0–0.4)
EOSINOPHIL NFR BLD AUTO: 0.8 % (ref 0.3–6.2)
ERYTHROCYTE [DISTWIDTH] IN BLOOD BY AUTOMATED COUNT: 13.6 % (ref 12.3–15.4)
GLOBULIN UR ELPH-MCNC: 2.5 GM/DL
GLUCOSE SERPL-MCNC: 73 MG/DL (ref 65–99)
HCT VFR BLD AUTO: 40.1 % (ref 34–46.6)
HGB BLD-MCNC: 12.9 G/DL (ref 12–15.9)
IMM GRANULOCYTES # BLD AUTO: 0.05 10*3/MM3 (ref 0–0.05)
IMM GRANULOCYTES NFR BLD AUTO: 0.4 % (ref 0–0.5)
LYMPHOCYTES # BLD AUTO: 2.77 10*3/MM3 (ref 0.7–3.1)
LYMPHOCYTES NFR BLD AUTO: 23.8 % (ref 19.6–45.3)
MAGNESIUM SERPL-MCNC: 2 MG/DL (ref 1.6–2.6)
MCH RBC QN AUTO: 25.7 PG (ref 26.6–33)
MCHC RBC AUTO-ENTMCNC: 32.2 G/DL (ref 31.5–35.7)
MCV RBC AUTO: 80 FL (ref 79–97)
MONOCYTES # BLD AUTO: 0.71 10*3/MM3 (ref 0.1–0.9)
MONOCYTES NFR BLD AUTO: 6.1 % (ref 5–12)
NEUTROPHILS NFR BLD AUTO: 68.5 % (ref 42.7–76)
NEUTROPHILS NFR BLD AUTO: 7.95 10*3/MM3 (ref 1.7–7)
NRBC BLD AUTO-RTO: 0 /100 WBC (ref 0–0.2)
PLATELET # BLD AUTO: 325 10*3/MM3 (ref 140–450)
PMV BLD AUTO: 12.3 FL (ref 6–12)
POTASSIUM SERPL-SCNC: 4.6 MMOL/L (ref 3.5–5.2)
PROT SERPL-MCNC: 7.2 G/DL (ref 6–8.5)
RBC # BLD AUTO: 5.01 10*6/MM3 (ref 3.77–5.28)
SODIUM SERPL-SCNC: 142 MMOL/L (ref 136–145)
WBC NRBC COR # BLD: 11.62 10*3/MM3 (ref 3.4–10.8)

## 2022-06-03 LAB — HCG INTACT+B SERPL-ACNC: <1 MIU/ML

## 2022-06-06 LAB — REF LAB TEST METHOD: NORMAL

## 2022-06-08 RX ORDER — LEVOTHYROXINE SODIUM 0.03 MG/1
TABLET ORAL
Qty: 60 TABLET | Refills: 0 | Status: SHIPPED | OUTPATIENT
Start: 2022-06-08 | End: 2022-08-26 | Stop reason: SDUPTHER

## 2022-06-28 DIAGNOSIS — F32.A DEPRESSION, UNSPECIFIED DEPRESSION TYPE: ICD-10-CM

## 2022-06-28 NOTE — PROGRESS NOTES
I have seen the patient.  I have reviewed the notes, assessments, and/or procedures performed by Levy Zelaya MD during office visit I concur with her/his documentation and assessment and plan for Yumiko Lopez.            This document has been electronically signed by Alan Antony MD on June 28, 2022 16:21 CDT

## 2022-07-12 DIAGNOSIS — M25.511 CHRONIC RIGHT SHOULDER PAIN: ICD-10-CM

## 2022-07-12 DIAGNOSIS — G89.29 CHRONIC RIGHT SHOULDER PAIN: ICD-10-CM

## 2022-07-12 RX ORDER — TIZANIDINE 2 MG/1
TABLET ORAL
Qty: 32 TABLET | Refills: 0 | Status: SHIPPED | OUTPATIENT
Start: 2022-07-12 | End: 2022-09-06 | Stop reason: SDUPTHER

## 2022-07-13 ENCOUNTER — HOSPITAL ENCOUNTER (INPATIENT)
Facility: HOSPITAL | Age: 27
LOS: 2 days | Discharge: HOME | End: 2022-07-15
Attending: PSYCHIATRY & NEUROLOGY | Admitting: PSYCHIATRY & NEUROLOGY

## 2022-07-13 ENCOUNTER — HOSPITAL ENCOUNTER (EMERGENCY)
Facility: HOSPITAL | Age: 27
Discharge: PSYCHIATRIC OR SUBSTANCE USE FACILITY | End: 2022-07-13
Attending: EMERGENCY MEDICINE

## 2022-07-13 VITALS
SYSTOLIC BLOOD PRESSURE: 129 MMHG | HEART RATE: 68 BPM | TEMPERATURE: 97.2 F | OXYGEN SATURATION: 98 % | DIASTOLIC BLOOD PRESSURE: 83 MMHG | RESPIRATION RATE: 18 BRPM

## 2022-07-13 DIAGNOSIS — R45.851 SUICIDAL IDEATION: Primary | ICD-10-CM

## 2022-07-13 LAB
ALBUMIN SERPL-MCNC: 4.6 G/DL (ref 3.5–5.2)
ALBUMIN/GLOB SERPL: 1.7 G/DL
ALP SERPL-CCNC: 84 U/L (ref 39–117)
ALT SERPL W P-5'-P-CCNC: 24 U/L (ref 1–33)
AMPHET+METHAMPHET UR QL: NEGATIVE
AMPHETAMINES UR QL: NEGATIVE
ANION GAP SERPL CALCULATED.3IONS-SCNC: 10 MMOL/L (ref 5–15)
APAP SERPL-MCNC: <5 MCG/ML (ref 0–30)
AST SERPL-CCNC: 25 U/L (ref 1–32)
BACTERIA UR QL AUTO: ABNORMAL /HPF
BARBITURATES UR QL SCN: NEGATIVE
BASOPHILS # BLD AUTO: 0.07 10*3/MM3 (ref 0–0.2)
BASOPHILS NFR BLD AUTO: 0.6 % (ref 0–1.5)
BENZODIAZ UR QL SCN: NEGATIVE
BILIRUB SERPL-MCNC: 0.3 MG/DL (ref 0–1.2)
BILIRUB UR QL STRIP: NEGATIVE
BUN SERPL-MCNC: 11 MG/DL (ref 6–20)
BUN/CREAT SERPL: 16.7 (ref 7–25)
BUPRENORPHINE SERPL-MCNC: NEGATIVE NG/ML
CALCIUM SPEC-SCNC: 9.5 MG/DL (ref 8.6–10.5)
CANNABINOIDS SERPL QL: NEGATIVE
CHLORIDE SERPL-SCNC: 104 MMOL/L (ref 98–107)
CLARITY UR: CLEAR
CO2 SERPL-SCNC: 25 MMOL/L (ref 22–29)
COCAINE UR QL: NEGATIVE
COLOR UR: YELLOW
CREAT SERPL-MCNC: 0.66 MG/DL (ref 0.57–1)
DEPRECATED RDW RBC AUTO: 38.1 FL (ref 37–54)
EGFRCR SERPLBLD CKD-EPI 2021: 124.2 ML/MIN/1.73
EOSINOPHIL # BLD AUTO: 0.06 10*3/MM3 (ref 0–0.4)
EOSINOPHIL NFR BLD AUTO: 0.5 % (ref 0.3–6.2)
ERYTHROCYTE [DISTWIDTH] IN BLOOD BY AUTOMATED COUNT: 13.7 % (ref 12.3–15.4)
ETHANOL BLD-MCNC: <10 MG/DL (ref 0–10)
ETHANOL UR QL: <0.01 %
FLUAV RNA RESP QL NAA+PROBE: NOT DETECTED
FLUBV RNA RESP QL NAA+PROBE: NOT DETECTED
GLOBULIN UR ELPH-MCNC: 2.7 GM/DL
GLUCOSE SERPL-MCNC: 104 MG/DL (ref 65–99)
GLUCOSE UR STRIP-MCNC: NEGATIVE MG/DL
HCG SERPL QL: NEGATIVE
HCT VFR BLD AUTO: 39.6 % (ref 34–46.6)
HGB BLD-MCNC: 13 G/DL (ref 12–15.9)
HGB UR QL STRIP.AUTO: NEGATIVE
HOLD SPECIMEN: NORMAL
HYALINE CASTS UR QL AUTO: ABNORMAL /LPF
IMM GRANULOCYTES # BLD AUTO: 0.06 10*3/MM3 (ref 0–0.05)
IMM GRANULOCYTES NFR BLD AUTO: 0.5 % (ref 0–0.5)
KETONES UR QL STRIP: ABNORMAL
LEUKOCYTE ESTERASE UR QL STRIP.AUTO: NEGATIVE
LYMPHOCYTES # BLD AUTO: 2.44 10*3/MM3 (ref 0.7–3.1)
LYMPHOCYTES NFR BLD AUTO: 21.7 % (ref 19.6–45.3)
MCH RBC QN AUTO: 26 PG (ref 26.6–33)
MCHC RBC AUTO-ENTMCNC: 32.8 G/DL (ref 31.5–35.7)
MCV RBC AUTO: 79.2 FL (ref 79–97)
METHADONE UR QL SCN: NEGATIVE
MONOCYTES # BLD AUTO: 0.69 10*3/MM3 (ref 0.1–0.9)
MONOCYTES NFR BLD AUTO: 6.1 % (ref 5–12)
NEUTROPHILS NFR BLD AUTO: 7.94 10*3/MM3 (ref 1.7–7)
NEUTROPHILS NFR BLD AUTO: 70.6 % (ref 42.7–76)
NITRITE UR QL STRIP: NEGATIVE
NRBC BLD AUTO-RTO: 0 /100 WBC (ref 0–0.2)
OPIATES UR QL: NEGATIVE
OXYCODONE UR QL SCN: NEGATIVE
PCP UR QL SCN: NEGATIVE
PH UR STRIP.AUTO: 6 [PH] (ref 5–9)
PLATELET # BLD AUTO: 341 10*3/MM3 (ref 140–450)
PMV BLD AUTO: 11.4 FL (ref 6–12)
POTASSIUM SERPL-SCNC: 3.8 MMOL/L (ref 3.5–5.2)
PROPOXYPH UR QL: NEGATIVE
PROT SERPL-MCNC: 7.3 G/DL (ref 6–8.5)
PROT UR QL STRIP: ABNORMAL
RBC # BLD AUTO: 5 10*6/MM3 (ref 3.77–5.28)
RBC # UR STRIP: ABNORMAL /HPF
REF LAB TEST METHOD: ABNORMAL
SALICYLATES SERPL-MCNC: <0.3 MG/DL
SARS-COV-2 RNA RESP QL NAA+PROBE: NOT DETECTED
SODIUM SERPL-SCNC: 139 MMOL/L (ref 136–145)
SP GR UR STRIP: 1.03 (ref 1–1.03)
SQUAMOUS #/AREA URNS HPF: ABNORMAL /HPF
TRICYCLICS UR QL SCN: NEGATIVE
UROBILINOGEN UR QL STRIP: ABNORMAL
WBC # UR STRIP: ABNORMAL /HPF
WBC NRBC COR # BLD: 11.26 10*3/MM3 (ref 3.4–10.8)
WHOLE BLOOD HOLD COAG: NORMAL
WHOLE BLOOD HOLD SPECIMEN: NORMAL

## 2022-07-13 PROCEDURE — 84703 CHORIONIC GONADOTROPIN ASSAY: CPT | Performed by: EMERGENCY MEDICINE

## 2022-07-13 PROCEDURE — 80143 DRUG ASSAY ACETAMINOPHEN: CPT

## 2022-07-13 PROCEDURE — 85025 COMPLETE CBC W/AUTO DIFF WBC: CPT | Performed by: EMERGENCY MEDICINE

## 2022-07-13 PROCEDURE — 80179 DRUG ASSAY SALICYLATE: CPT

## 2022-07-13 PROCEDURE — 81001 URINALYSIS AUTO W/SCOPE: CPT | Performed by: EMERGENCY MEDICINE

## 2022-07-13 PROCEDURE — 80053 COMPREHEN METABOLIC PANEL: CPT | Performed by: EMERGENCY MEDICINE

## 2022-07-13 PROCEDURE — 80306 DRUG TEST PRSMV INSTRMNT: CPT | Performed by: EMERGENCY MEDICINE

## 2022-07-13 PROCEDURE — 87636 SARSCOV2 & INF A&B AMP PRB: CPT | Performed by: EMERGENCY MEDICINE

## 2022-07-13 PROCEDURE — 36415 COLL VENOUS BLD VENIPUNCTURE: CPT

## 2022-07-13 PROCEDURE — 82077 ASSAY SPEC XCP UR&BREATH IA: CPT | Performed by: EMERGENCY MEDICINE

## 2022-07-13 PROCEDURE — 99285 EMERGENCY DEPT VISIT HI MDM: CPT

## 2022-07-13 RX ORDER — ACETAMINOPHEN 325 MG/1
650 TABLET ORAL EVERY 4 HOURS PRN
Status: DISCONTINUED | OUTPATIENT
Start: 2022-07-13 | End: 2022-07-15 | Stop reason: HOSPADM

## 2022-07-13 RX ORDER — ALUMINA, MAGNESIA, AND SIMETHICONE 2400; 2400; 240 MG/30ML; MG/30ML; MG/30ML
15 SUSPENSION ORAL EVERY 6 HOURS PRN
Status: DISCONTINUED | OUTPATIENT
Start: 2022-07-13 | End: 2022-07-15 | Stop reason: HOSPADM

## 2022-07-13 RX ORDER — HYDROXYZINE PAMOATE 50 MG/1
50 CAPSULE ORAL EVERY 6 HOURS PRN
Status: DISCONTINUED | OUTPATIENT
Start: 2022-07-13 | End: 2022-07-15 | Stop reason: HOSPADM

## 2022-07-13 RX ORDER — CLONIDINE HYDROCHLORIDE 0.1 MG/1
0.1 TABLET ORAL EVERY 4 HOURS PRN
Status: DISCONTINUED | OUTPATIENT
Start: 2022-07-13 | End: 2022-07-15 | Stop reason: HOSPADM

## 2022-07-13 RX ORDER — TRAZODONE HYDROCHLORIDE 50 MG/1
50 TABLET ORAL NIGHTLY PRN
Status: DISCONTINUED | OUTPATIENT
Start: 2022-07-13 | End: 2022-07-15 | Stop reason: HOSPADM

## 2022-07-13 RX ORDER — LOPERAMIDE HYDROCHLORIDE 2 MG/1
2 CAPSULE ORAL
Status: DISCONTINUED | OUTPATIENT
Start: 2022-07-13 | End: 2022-07-15 | Stop reason: HOSPADM

## 2022-07-13 NOTE — ED TRIAGE NOTES
Pt presents to ED with C/O suicidal thoughts beginning yesterday. Pt states she thought about driving her car off of the bridge.

## 2022-07-14 PROBLEM — Z63.9 RELATIONSHIP PROBLEMS: Status: ACTIVE | Noted: 2022-07-14

## 2022-07-14 PROBLEM — R45.851 SUICIDAL IDEATION: Status: RESOLVED | Noted: 2022-07-13 | Resolved: 2022-07-14

## 2022-07-14 PROBLEM — F43.23 ADJUSTMENT DISORDER WITH MIXED ANXIETY AND DEPRESSED MOOD: Status: ACTIVE | Noted: 2022-07-14

## 2022-07-14 PROBLEM — R45.89 INEFFECTIVE COPING: Status: ACTIVE | Noted: 2022-07-14

## 2022-07-14 LAB
CHOLEST SERPL-MCNC: 163 MG/DL (ref 0–200)
GLUCOSE P FAST SERPL-MCNC: 100 MG/DL (ref 74–106)
HDLC SERPL-MCNC: 43 MG/DL (ref 40–60)
LDLC SERPL CALC-MCNC: 103 MG/DL (ref 0–100)
LDLC/HDLC SERPL: 2.37 {RATIO}
TRIGL SERPL-MCNC: 91 MG/DL (ref 0–150)
VLDLC SERPL-MCNC: 17 MG/DL (ref 5–40)

## 2022-07-14 PROCEDURE — 99223 1ST HOSP IP/OBS HIGH 75: CPT | Performed by: PSYCHIATRY & NEUROLOGY

## 2022-07-14 PROCEDURE — 80061 LIPID PANEL: CPT | Performed by: PSYCHIATRY & NEUROLOGY

## 2022-07-14 PROCEDURE — 82947 ASSAY GLUCOSE BLOOD QUANT: CPT | Performed by: PSYCHIATRY & NEUROLOGY

## 2022-07-14 PROCEDURE — 90833 PSYTX W PT W E/M 30 MIN: CPT | Performed by: PSYCHIATRY & NEUROLOGY

## 2022-07-14 RX ORDER — FAMOTIDINE 20 MG/1
20 TABLET, FILM COATED ORAL 2 TIMES DAILY
Status: DISCONTINUED | OUTPATIENT
Start: 2022-07-14 | End: 2022-07-15 | Stop reason: HOSPADM

## 2022-07-14 RX ORDER — LEVOTHYROXINE SODIUM 0.03 MG/1
25 TABLET ORAL DAILY
Status: DISCONTINUED | OUTPATIENT
Start: 2022-07-14 | End: 2022-07-15 | Stop reason: HOSPADM

## 2022-07-14 RX ORDER — TIZANIDINE 4 MG/1
4 TABLET ORAL EVERY 8 HOURS PRN
Status: DISCONTINUED | OUTPATIENT
Start: 2022-07-14 | End: 2022-07-15 | Stop reason: HOSPADM

## 2022-07-14 RX ADMIN — FAMOTIDINE 20 MG: 20 TABLET ORAL at 12:46

## 2022-07-14 RX ADMIN — LEVOTHYROXINE SODIUM 25 MCG: 25 TABLET ORAL at 12:46

## 2022-07-14 RX ADMIN — TIZANIDINE 4 MG: 4 TABLET ORAL at 16:26

## 2022-07-14 RX ADMIN — SERTRALINE 50 MG: 50 TABLET, FILM COATED ORAL at 12:46

## 2022-07-14 RX ADMIN — FAMOTIDINE 20 MG: 20 TABLET ORAL at 21:00

## 2022-07-14 NOTE — PROGRESS NOTES
"DATA:        MSW met individually with patient this date to introduce role and to discuss hospitalization expectations. Patient agreeable. Reviewed medical record and staffed case with treatment team this date. No major issues identified.       Clinical Maneuvering/Intervention:     MSW assisted patient in processing above session content; acknowledged and normalized patient’s thoughts, feelings, and concerns.  Discussed the therapist/patient relationship and explain the parameters and limitations of relative confidentiality.  Also discussed the importance of active participation, and honesty to the treatment process.  Encouraged the patient to discuss/vent their feelings, frustrations, and fears concerning their ongoing medical issues and validated their feelings.     Allowed patient to freely discuss issues without interruption or judgment. Provided safe, confidential environment to facilitate the development of positive therapeutic relationship and encourage open, honest communication.      MSW addressed discharge safety planning this date. Assisted patient in identifying risk factors which would indicate the need for higher level of care after discharge;  including thoughts to harm self or others and/or self-harming behavior. Encouraged patient to call 911, or present to the nearest emergency room should any of these events occur. Discussed crisis intervention services and means to access.  Encouraged securing any objects of harm.       MSW completed integrated summary, treatment plan, and initiated social history this date.  MSW is strongly encouraging family involvement in treatment.       ASSESSMENT:      The patient is a  27 y\o female adult. Pt is A&Ox4. Pt reports that she was \"having bad thoughts.\" Pt's  infomred pt he wanted divorce and pt stated she wanted to run her car off a bridge. Pt stated she has never had SI until now. Pt denies any current SI\HI\AVH, but asked to leave. 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 Pt states her daughter needs her and would like to leave AMA. Pt is employeed full time at [employer]. Pt reports never being hospitalized for mental health concerns. Pt does not see a provider. Pt drinks wine on occassion. Pt denies any legal issues and reports to be  with one child. During assessment, pt was tearful, and stated numerous times, while crying, \"I am fine, and I need to go my daughter needs me, she makes me feel better.\" Pt dennies issues with eating or sleeping. pt denies SI\HI\AVH at this time. Pt denies depression and anxiety and states this while crying. Pt reports she has friends and family for support. Pt grants consent for .     Pt grants consent to speak with her . MSW attemtped call, no answer and VM left for return call to unit.     Mental Status Exam:    Hygiene:   good  Cooperation:  Guarded  Eye Contact:  Good  Psychomotor Behavior:  Aggitated  Affect:  Restricted  Speech:  Rambling  Linear  Thought Content:  Mood congruent  Suicidal:  None  Homicidal:  None  Hallucinations:  None  Delusion:  None  Memory:  Intact  Orientation:  Grossly intact  Reliability:  fair  Insight:  Poor  Judgement:  Poor  Impulse Control:  Fair      Goals for treatment: Pt states she does not need to be here and wants to go home.      Prior Hospitalizations / Dates: None on [facility], pt denies     Childhood History:  denies abuse     Suicide Attempts:  denies     Alcohol:  Wine, occasionally    Substances: denies     Sexual:    heterosexual     Education:   some college     Access to firearms:  denies, unable to verify        PLAN:       Patient to remain hospitalized this date.      Treatment team will focus efforts on stabilizing patient's acute symptoms while providing education on healthy coping and crisis management to reduce hospitalizations.   Patient requires daily psychiatrist " evaluation and 24/7 nursing supervision to promote patient  safety.     MSW will offer 1-4 individual sessions, family education, and appropriate referral.     MSW recommends  Pt remain in hospital at this time, possible referral for IOP, out pt services, family session, education, after care

## 2022-07-14 NOTE — PLAN OF CARE
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

## 2022-07-14 NOTE — PLAN OF CARE
Problem: Adult Behavioral Health Plan of Care  Goal: Plan of Care Review  Outcome: Ongoing, Progressing  Flowsheets (Taken 7/14/2022 1450)  Consent Given to Review Plan with: , Arnold Lopez  Plan of Care Reviewed With: patient  Patient Agreement with Plan of Care: agrees  Outcome Evaluation: New SW assessment  Goal: Patient-Specific Goal (Individualization)  Outcome: Ongoing, Progressing  Flowsheets  Taken 7/14/2022 1450  Patient-Specific Goals (Include Timeframe): Pt to have NO SI\HI\AVH at time of discharge and verbalize 1-3 learned coping skill  Individualized Care Needs: Groups, education, family session, safety planning, after care  Anxieties, Fears or Concerns: None voiced  Taken 7/14/2022 1407  Patient Personal Strengths:   ability to maintain sobriety   humor   independent living skills   self-reliant   socioeconomic stability   positive vocational history   positive educational history   resilient   expressive of needs   expressive of emotions  Patient Vulnerabilities:   family/relationship conflict   lacks insight into illness   poor impulse control  Goal: Optimized Coping Skills in Response to Life Stressors  Outcome: Ongoing, Progressing  Flowsheets (Taken 7/14/2022 1450)  Optimized Coping Skills in Response to Life Stressors: making progress toward outcome  Intervention: Promote Effective Coping Strategies  Flowsheets (Taken 7/14/2022 1450)  Supportive Measures:   active listening utilized   verbalization of feelings encouraged  Goal: Develops/Participates in Therapeutic Taylor to Support Successful Transition  Outcome: Ongoing, Progressing  Flowsheets (Taken 7/14/2022 1450)  Develops/Participates in Therapeutic Taylor to Support Successful Transition: making progress toward outcome  Intervention: Foster Therapeutic Taylor  Flowsheets (Taken 7/14/2022 1450)  Trust Relationship/Rapport:   care explained   choices provided   reassurance provided   thoughts/feelings acknowledged   emotional  support provided   empathic listening provided   questions answered   questions encouraged  Intervention: Mutually Develop Transition Plan  Flowsheets  Taken 7/14/2022 1450  Outpatient/Agency/Support Group Needs:   outpatient medication management   outpatient counseling  Transition Support:   community resources reviewed   follow-up care discussed  Anticipated Discharge Disposition: home with family  Offered/Gave Vendor List: no  Taken 7/14/2022 1448  Discharge Coordination/Progress: [facility]  Transportation Anticipated:   car, drives self   family or friend will provide  Transportation Concerns: none  Current Discharge Risk: lack of support system/caregiver  Concerns to be Addressed:   adjustment to diagnosis/illness   coping/stress   compliance issue   mental health   suicidal   medication   discharge planning   home safety   relationship  Readmission Within the Last 30 Days: no previous admission in last 30 days  Patient/Family Anticipated Services at Transition: mental health services  Patient's Choice of Community Agency(s): [facility]  Patient/Family Anticipates Transition to: home  Offered/Gave Vendor List: no     Problem: Suicide Risk  Goal: Absence of Self-Harm  Intervention: Promote Psychosocial Wellbeing  Recent Flowsheet Documentation  Taken 7/14/2022 1450 by Mabel Suazo MSW  Supportive Measures:   active listening utilized   verbalization of feelings encouraged

## 2022-07-14 NOTE — NURSING NOTE
Patient arrived to [facility] accompanied by ER staff and security. Wand used and no contraband found.Patient is a voluntary admission and completed all admission paperwork. Patient reports she is here because  told her yesterday he did not love her anymore and was leaving her. Patient reports thoughts of driving her car off a bridge. Rules and regulations of [facility] explained to patient. Patient  home medications Zofran and Ibuprofen sent to pharmacy.

## 2022-07-14 NOTE — CONSULTS
[facility] Medicine Admission      Date of Admission: 7/13/2022      Primary Care Physician: Romana Daniel MD      Chief Complaint: medical assessment/management    HPI: 27 year old female with past medical history of previous SVT, hypothyroidism who is currently admitted to behavioral health unit related to suicidal ideation.  Hospitalist team is consulted for medical assessment/management.  During today's visit, patient denies any complaints such as fever, chills, nausea, vomiting, diarrhea, chest pain, dyspnea, headaches.  She is tearful during conversation reporting she misses seeing her child.     Concurrent Medical History:  has a past medical history of Anxiety, Colitis, Depression, Personal history of cardiac arrhythmia, SVT (supraventricular tachycardia) (HCC), and Thyroid dysfunction.    Past Surgical History:  has a past surgical history that includes Cardiac Ablation (11/2015); Ankle surgery (12/01/2010); and Appendectomy (11/2012).    Family History: family history includes Colon cancer in her maternal grandfather; Deep vein thrombosis in an other family member; Depression in her mother; Hypertension in her father; No Known Problems in her brother, maternal grandmother, and sister; Uterine cancer in her paternal grandmother.    Social History:  reports that she has never smoked. She has never used smokeless tobacco. She reports that she does not drink alcohol and does not use drugs.    Allergies:   Allergies   Allergen Reactions   • Tamiflu [Oseltamivir Phosphate] Rash   • Vancomycin Itching       Medications:   Prior to Admission medications    Medication Sig Start Date End Date Taking? Authorizing Provider   famotidine (PEPCID) 20 MG tablet TAKE 1 TABLET BY MOUTH TWICE DAILY 3/9/22  Yes Won Brunson MD   levonorgestrel-ethinyl estradiol (NORDETTE) 0.15-30 MG-MCG per tablet Take 1 tablet by mouth Daily. 4/20/22 4/20/23 Yes Safia Escobedo APRN    levothyroxine (SYNTHROID, LEVOTHROID) 25 MCG tablet TAKE 1 TABLET EVERY DAY 6/8/22  Yes Won Brunson MD   meloxicam (Mobic) 7.5 MG tablet Take 1 tablet by mouth Daily for 60 days. 5/23/22 7/22/22  Ashlee Green APRN   ondansetron (ZOFRAN) 4 MG tablet Take 1 tablet by mouth Every 6 (Six) Hours As Needed for Nausea or Vomiting. 7/3/21   Aj Rudolph MD   sertraline (ZOLOFT) 50 MG tablet TAKE 1 TABLET EVERY DAY 6/28/22   Levy Zelaya MD   tiZANidine (ZANAFLEX) 2 MG tablet TAKE 1 TABLET BY MOUTH EVERY 8 HOURS AS NEEDED FOR MUSCLE SPASMS FOR UP TO 14 DAYS 7/12/22   Levy Zelaya MD       Review of Systems:  Review of Systems   Constitutional: Negative for chills, fatigue and fever.   HENT: Negative for congestion, rhinorrhea and sore throat.    Respiratory: Negative for cough, choking and wheezing.    Cardiovascular: Negative for chest pain, palpitations and leg swelling.   Gastrointestinal: Negative for abdominal pain, diarrhea, nausea and vomiting.   Musculoskeletal: Negative for back pain and neck pain.   Skin: Negative for pallor.   Neurological: Negative for dizziness, weakness and headaches.   Psychiatric/Behavioral: Negative for confusion. The patient is not nervous/anxious.             Physical Exam:   Temp:  [97.2 °F (36.2 °C)-98.5 °F (36.9 °C)] 98.5 °F (36.9 °C)  Heart Rate:  [61-83] 61  Resp:  [16-18] 16  BP: (129-144)/(80-86) 144/86  Physical Exam  Vitals and nursing note reviewed.   Constitutional:       General: She is not in acute distress.     Appearance: Normal appearance. She is not ill-appearing.   HENT:      Head: Normocephalic and atraumatic.      Right Ear: External ear normal.      Left Ear: External ear normal.      Nose: Nose normal.      Mouth/Throat:      Mouth: Mucous membranes are moist.      Pharynx: Oropharynx is clear.   Eyes:      General: No scleral icterus.        Right eye: No discharge.         Left eye: No discharge.      Conjunctiva/sclera: Conjunctivae  normal.   Cardiovascular:      Rate and Rhythm: Normal rate and regular rhythm.      Heart sounds: Normal heart sounds. No murmur heard.    No friction rub. No gallop.   Pulmonary:      Effort: No respiratory distress.      Breath sounds: Normal breath sounds. No stridor. No wheezing, rhonchi or rales.   Abdominal:      General: Bowel sounds are normal. There is no distension.      Palpations: Abdomen is soft.      Tenderness: There is no abdominal tenderness.   Musculoskeletal:         General: No swelling. Normal range of motion.      Cervical back: Normal range of motion and neck supple.   Skin:     General: Skin is warm and dry.   Neurological:      General: No focal deficit present.      Mental Status: She is alert and oriented to person, place, and time.   Psychiatric:         Mood and Affect: Mood is depressed. Affect is tearful.         Speech: Speech normal.         Behavior: Behavior normal.         CN I: Sense of smell intact  CN II: Visual fields intact  CN III,IV,VI: extraocular movements intact  CN V: Masseter strength and sensation in all three divisions intact  CN VII: Smile and eyelid closure symmetrical  CN VIII: Hearing intact  CN IX and X: Voice and palate movement intact  CN XI: Shoulder shrug intact  CN XII: Tongue protrusion and movement intact    Results Reviewed:  I have personally reviewed current lab, radiology, and data and agree with results.  Lab Results (last 24 hours)     Procedure Component Value Units Date/Time    Glucose, Fasting [[id number]]  (Normal) Collected: 07/14/22 0616    Specimen: Blood Updated: 07/14/22 0706     Glucose, Fasting 100 mg/dL     Lipid Panel [[id number]]  (Abnormal) Collected: 07/14/22 0616    Specimen: Blood Updated: 07/14/22 0706     Total Cholesterol 163 mg/dL      Triglycerides 91 mg/dL      HDL Cholesterol 43 mg/dL      LDL Cholesterol  103 mg/dL      VLDL Cholesterol 17 mg/dL      LDL/HDL Ratio 2.37    Narrative:      Cholesterol Reference Ranges  (U.S.  Department of Health and Human Services ATP III Classifications)    Desirable          <200 mg/dL  Borderline High    200-239 mg/dL  High Risk          >240 mg/dL      Triglyceride Reference Ranges  (U.S. Department of Health and Human Services ATP III Classifications)    Normal           <150 mg/dL  Borderline High  150-199 mg/dL  High             200-499 mg/dL  Very High        >500 mg/dL    HDL Reference Ranges  (U.S. Department of Health and Human Services ATP III Classifications)    Low     <40 mg/dl (major risk factor for CHD)  High    >60 mg/dl ('negative' risk factor for CHD)        LDL Reference Ranges  (U.S. Department of Health and Human Services ATP III Classifications)    Optimal          <100 mg/dL  Near Optimal     100-129 mg/dL  Borderline High  130-159 mg/dL  High             160-189 mg/dL  Very High        >189 mg/dL        Imaging Results (Last 24 Hours)     ** No results found for the last 24 hours. **            Assessment:    Active Hospital Problems    Diagnosis    • Suicidal ideation              Plan:  1. Suicidal ideation: defer management to psychiatry.   2. Hx hypothyroidism: continue home dose of Synthroid.  3. Hx SVT: patient reports this has been stable and she no longer follows with cardiology.  Not currently medicated.  Heart rates 60-80's on review of vital signs.     Labs and vitals reviewed.  Patient is currently in stable condition and hospitalist team can see on as needed basis.  Please call if any needs arise.     I confirmed that the patient's Advance Care Plan is present, code status is documented, or surrogate decision maker is listed in the patient's medical record.      I have utilized all available immediate resources to obtain, update, or review the patient's current medications.          This document has been electronically signed by JOHANA Echols on July 14, 2022 14:35 CDT

## 2022-07-14 NOTE — PLAN OF CARE
Goal Outcome Evaluation:  Plan of Care Reviewed With: patient  Patient Agreement with Plan of Care: agrees     Progress: no change  Outcome Evaluation: Patient has slept approximately 4.5 hours thus far during shift.

## 2022-07-14 NOTE — ED PROVIDER NOTES
Subjective   27 year old female patient presents to ER with complaint of suicidal ideation. She reports that yesterday her  told her that he was leaving her. They have a one year old daughter together. She is still staying in the home with her  and daughter, but she reports that she feels safe. She reports that she was driving and thought about driving off of the bridge. She has no past suicide attempts but a history of depression, including post partum depression. She takes zoloft for her depression. She denies tobacco or illicit drug use, drinks wine occasionally.           Review of Systems   Constitutional: Negative.    HENT: Negative.    Eyes: Negative.    Respiratory: Negative.    Cardiovascular: Negative.    Gastrointestinal: Negative.    Endocrine: Negative.    Genitourinary: Negative.    Musculoskeletal: Negative.    Skin: Negative.    Allergic/Immunologic: Negative.    Neurological: Negative.    Hematological: Negative.    Psychiatric/Behavioral: Positive for dysphoric mood, sleep disturbance and suicidal ideas. Negative for self-injury.       Past Medical History:   Diagnosis Date   • Anxiety    • Colitis    • Depression    • Personal history of cardiac arrhythmia    • SVT (supraventricular tachycardia) (HCC)     s/p ablation November 2015   • Thyroid dysfunction        Allergies   Allergen Reactions   • Tamiflu [Oseltamivir Phosphate] Rash   • Vancomycin Itching       Past Surgical History:   Procedure Laterality Date   • ANKLE SURGERY  12/01/2010   • APPENDECTOMY  11/2012   • CARDIAC ABLATION  11/2015    for SVT       Family History   Problem Relation Age of Onset   • Depression Mother    • Hypertension Father    • No Known Problems Sister    • No Known Problems Brother    • No Known Problems Maternal Grandmother    • Colon cancer Maternal Grandfather    • Uterine cancer Paternal Grandmother    • Deep vein thrombosis Other        Social History     Socioeconomic History   • Marital status:     Tobacco Use   • Smoking status: Never Smoker   • Smokeless tobacco: Never Used   Vaping Use   • Vaping Use: Never used   Substance and Sexual Activity   • Alcohol use: No   • Drug use: No   • Sexual activity: Yes     Partners: Male     Comment: last pap smear 9/27/18 negative            Objective    /83 (BP Location: Right arm, Patient Position: Sitting)   Pulse 68   Temp 97.2 °F (36.2 °C) (Oral)   Resp 18   LMP 07/13/2022   SpO2 98%     Physical Exam  Vitals and nursing note reviewed.   Constitutional:       General: She is not in acute distress.     Appearance: Normal appearance. She is not ill-appearing, toxic-appearing or diaphoretic.   HENT:      Head: Normocephalic.      Right Ear: External ear normal.      Left Ear: External ear normal.      Nose: Nose normal.      Mouth/Throat:      Mouth: Mucous membranes are moist.   Eyes:      Pupils: Pupils are equal, round, and reactive to light.   Cardiovascular:      Rate and Rhythm: Normal rate and regular rhythm.      Pulses: Normal pulses.      Heart sounds: Normal heart sounds.   Pulmonary:      Effort: Pulmonary effort is normal.      Breath sounds: Normal breath sounds.   Abdominal:      General: Bowel sounds are normal.      Palpations: Abdomen is soft.   Musculoskeletal:         General: Normal range of motion.      Cervical back: Normal range of motion.   Skin:     General: Skin is warm and dry.      Capillary Refill: Capillary refill takes less than 2 seconds.   Neurological:      Mental Status: She is alert and oriented to person, place, and time.   Psychiatric:         Attention and Perception: Attention normal.         Mood and Affect: Mood is depressed. Mood is not anxious. Affect is tearful. Affect is not blunt or angry.         Speech: Speech normal.         Behavior: Behavior normal. Behavior is not slowed or withdrawn. Behavior is cooperative.         Thought Content: Thought content includes suicidal ideation. Thought content  includes suicidal plan.         Cognition and Memory: Cognition and memory normal.         Judgment: Judgment normal.         Procedures           ED Course  ED Course as of 07/13/22 2330 Wed Jul 13, 2022 2036 Medically clear for behavioral health evaluation [HS]   2107 Patient care transferred to Dr. Deutsch after verbal report given [HS]      ED Course User Index  [HS] Kaylie Michael, APRN      Results for orders placed or performed during the hospital encounter of 07/13/22   COVID-19 and FLU A/B PCR - Swab, Nasopharynx    Specimen: Nasopharynx; Swab   Result Value Ref Range    COVID19 Not Detected Not Detected - Ref. Range    Influenza A PCR Not Detected Not Detected    Influenza B PCR Not Detected Not Detected   Ethanol    Specimen: Blood   Result Value Ref Range    Ethanol <10 0 - 10 mg/dL    Ethanol % <0.010 %   Urine Drug Screen - Urine, Clean Catch    Specimen: Urine, Clean Catch   Result Value Ref Range    THC, Screen, Urine Negative Negative    Phencyclidine (PCP), Urine Negative Negative    Cocaine Screen, Urine Negative Negative    Methamphetamine, Ur Negative Negative    Opiate Screen Negative Negative    Amphetamine Screen, Urine Negative Negative    Benzodiazepine Screen, Urine Negative Negative    Tricyclic Antidepressants Screen Negative Negative    Methadone Screen, Urine Negative Negative    Barbiturates Screen, Urine Negative Negative    Oxycodone Screen, Urine Negative Negative    Propoxyphene Screen Negative Negative    Buprenorphine, Screen, Urine Negative Negative   hCG, Serum, Qualitative    Specimen: Blood   Result Value Ref Range    HCG Qualitative Negative Negative   Urinalysis With Microscopic If Indicated (No Culture) - Urine, Clean Catch    Specimen: Urine, Clean Catch   Result Value Ref Range    Color, UA Yellow Yellow, Straw, Dark Yellow, Ema    Appearance, UA Clear Clear    pH, UA 6.0 5.0 - 9.0    Specific Gravity, UA 1.034 (H) 1.003 - 1.030    Glucose, UA Negative Negative     Ketones, UA 40 mg/dL (2+) (A) Negative    Bilirubin, UA Negative Negative    Blood, UA Negative Negative    Protein, UA 30 mg/dL (1+) (A) Negative    Leuk Esterase, UA Negative Negative    Nitrite, UA Negative Negative    Urobilinogen, UA 1.0 E.U./dL 0.2 - 1.0 E.U./dL   Comprehensive Metabolic Panel    Specimen: Blood   Result Value Ref Range    Glucose 104 (H) 65 - 99 mg/dL    BUN 11 6 - 20 mg/dL    Creatinine 0.66 0.57 - 1.00 mg/dL    Sodium 139 136 - 145 mmol/L    Potassium 3.8 3.5 - 5.2 mmol/L    Chloride 104 98 - 107 mmol/L    CO2 25.0 22.0 - 29.0 mmol/L    Calcium 9.5 8.6 - 10.5 mg/dL    Total Protein 7.3 6.0 - 8.5 g/dL    Albumin 4.60 3.50 - 5.20 g/dL    ALT (SGPT) 24 1 - 33 U/L    AST (SGOT) 25 1 - 32 U/L    Alkaline Phosphatase 84 39 - 117 U/L    Total Bilirubin 0.3 0.0 - 1.2 mg/dL    Globulin 2.7 gm/dL    A/G Ratio 1.7 g/dL    BUN/Creatinine Ratio 16.7 7.0 - 25.0    Anion Gap 10.0 5.0 - 15.0 mmol/L    eGFR 124.2 >60.0 mL/min/1.73   CBC Auto Differential    Specimen: Blood   Result Value Ref Range    WBC 11.26 (H) 3.40 - 10.80 10*3/mm3    RBC 5.00 3.77 - 5.28 10*6/mm3    Hemoglobin 13.0 12.0 - 15.9 g/dL    Hematocrit 39.6 34.0 - 46.6 %    MCV 79.2 79.0 - 97.0 fL    MCH 26.0 (L) 26.6 - 33.0 pg    MCHC 32.8 31.5 - 35.7 g/dL    RDW 13.7 12.3 - 15.4 %    RDW-SD 38.1 37.0 - 54.0 fl    MPV 11.4 6.0 - 12.0 fL    Platelets 341 140 - 450 10*3/mm3    Neutrophil % 70.6 42.7 - 76.0 %    Lymphocyte % 21.7 19.6 - 45.3 %    Monocyte % 6.1 5.0 - 12.0 %    Eosinophil % 0.5 0.3 - 6.2 %    Basophil % 0.6 0.0 - 1.5 %    Immature Grans % 0.5 0.0 - 0.5 %    Neutrophils, Absolute 7.94 (H) 1.70 - 7.00 10*3/mm3    Lymphocytes, Absolute 2.44 0.70 - 3.10 10*3/mm3    Monocytes, Absolute 0.69 0.10 - 0.90 10*3/mm3    Eosinophils, Absolute 0.06 0.00 - 0.40 10*3/mm3    Basophils, Absolute 0.07 0.00 - 0.20 10*3/mm3    Immature Grans, Absolute 0.06 (H) 0.00 - 0.05 10*3/mm3    nRBC 0.0 0.0 - 0.2 /100 WBC   Salicylate Level    Specimen:  Blood   Result Value Ref Range    Salicylate <0.3 <=30.0 mg/dL   Acetaminophen Level    Specimen: Blood   Result Value Ref Range    Acetaminophen <5.0 0.0 - 30.0 mcg/mL   Urinalysis, Microscopic Only - Urine, Clean Catch    Specimen: Urine, Clean Catch   Result Value Ref Range    RBC, UA 0-2 (A) None Seen /HPF    WBC, UA 0-2 None Seen, 0-2, 3-5 /HPF    Bacteria, UA None Seen None Seen /HPF    Squamous Epithelial Cells, UA 0-2 None Seen, 0-2 /HPF    Hyaline Casts, UA 0-2 None Seen /LPF    Methodology Automated Microscopy    Green Top (Gel)   Result Value Ref Range    Extra Tube Hold for add-ons.    Lavender Top   Result Value Ref Range    Extra Tube hold for add-on    Light Blue Top   Result Value Ref Range    Extra Tube Hold for add-ons.      No Radiology Exams Resulted Within Past 24 Hours                                       MDM    Final diagnoses:   Suicidal ideation       ED Disposition  ED Disposition     ED Disposition   DC/Transfer to Behavioral Health Condition   --    Comment   --             No follow-up provider specified.       Medication List      No changes were made to your prescriptions during this visit.          Kenny Deutsch DO  07/13/22 [id number]

## 2022-07-14 NOTE — NURSING NOTE
"Behavior   Note any precipitants to event or behavior   Describe level and action of any aggressive behavior or speech and associated interventions.     Anxiety: Excess Worry  Depression: depressed mood  Pain  0  AVH   no  S/I   no  Plan  no  H/I   no  Plan  no    Affect   mood-congruent      Note: Patient is tearful and focused on discharge. Patient denies SI at this time and states \"I just had a really dark moment yesterday and my friend wanted me to come. I just need to get home to be with my daughter and I'm in a musical tomorrow and I don't want to miss it.\" Patient encouraged to attend group therapy while waiting to see the MD.       Intervention    PRN medication utilized:  no    Instructed in medication usage and effects  Medications administered as ordered  Encouraged to verbalize needs      Response    Verbalized understanding   Did patient take medications as ordered yes   Did patient interact with assessment?  yes     Plan    Will monitor for safety  Will monitor every 15 minutes as ordered  Will evaluate and promote the plan of care    Last BM:  unknown date  (Please chart in I/O as well)    "

## 2022-07-14 NOTE — PLAN OF CARE
Problem: Adult Behavioral Health Plan of Care  Goal: Optimized Coping Skills in Response to Life Stressors  Outcome: Ongoing, Progressing   Goal Outcome Evaluation:      Met with patient and completed recreation therapy assessment.  Patient reports that she is employed and she is in last semester of college.  Patient identifies leisure interest but reports that she has little time for leisure activity.  She reports that she is a very outgoing person and has a good support system.  Patient states that she listens to music to cope with stress.  Patient became tearful stating that her  just told her that he wanted to leave.  She states that she wants to go home.  Will encourage group participation and identification of coping skills.

## 2022-07-14 NOTE — NURSING NOTE
"Inpatient Psychiatry Initial Intake    7/13/2022    Yumiko Lopez, a 27 y.o. female, for initial evaluation visit.  Patient is referred by Kaylie VAUGHN.    Patient information was obtained from patient.  Patient presented voluntarily to the Emergency Department.  Precipitant and chief complaint: According to She,  \"  told me yesterday he didn't love me anymore and that he was leaving. It came out of nowhere and blind sided me.\".  Patient presents with suicidal thoughts/threats.   Onset of symptoms was abrupt starting 1 day ago.  Patient states symptoms have been exacerbated by relationship problem with  going to leave her.      Current Medications:  Scheduled Meds:   PRN Meds:     History:     Past Psychiatric History:   Previous therapy: no  Previous psychiatric treatment and medication trials:  no  Previous psychiatric hospitalizations:  no  Previous diagnoses:     depression  Previous suicide attempts:    no  Previous homicide attempts:    no  Family history of suicide:    no  Previous history of abuse:     no         History of physical abuse: no        No  History of legal issues and violence: The patient has no significant history of legal issues.  Currently in treatment with none.  Education: some college  Other pertinent history: None    Current Evaluation:     Mental Status Evaluation:  Appearance:  age appropriate and disheveled   Behavior:  restless and fidgety   Speech:  normal pitch   Mood:  anxious, depressed and sad   Affect:  mood-congruent   Thought Process:  normal   Thought Content:  suicidal   Sensorium:  person, place, time/date, situation, day of week, month of year and year   Cognition:  grossly intact   Insight:  fair   Judgment:  fair         Psychiatric Review Of Systems:  Sleep: no  Appetite changes: no  Weight changes: no  Energy: no  Interest/pleasure/anhedonia: no  Libido: no  Sexual orientation:  heterosexual  Anxiety/panic: yes  Guilty/hopeless: " yes  Self-injurious behavior/risky behavior: no    Stressors: marital    Substance Abuse History:     Substance Abuse History:  Tobacco use: no  Use of alcohol: yes - Glass of wine 2 or 3 times a week  Recreational drugs: denies  Use of OTC medications:   Hx of Overdose:  no  Hx of Blackouts: no  Past attempts to quit or limit use?:no  Patient feels she has mental, emotional, or medical problems co-occurred or worsened as a result of alcohol and/or drug use: no    Suicide Risk Screening:     Suicidal Ideation:  Current thoughts of suicide?no  Recent thoughts of suicide?yes    Suicide Planning:  Specific Plan?yes - Drive car off of bridge  Thought Content/Patients own words:.  Potentially lethal means?yes  Access to gun?no  Access to other lethal means?yes - car    Suicide Attempt:  Recent attempt?no  Past attempt?no  Cutting/burning/self-mutilation?no      Protective Factors:      Homicide Risk Screening:     Homicidal Ideation:  Current thoughts of homicide:  no  Recent thoughts of homicide:  no    Homicidal Planning:  Specific Plan?  no  Thought Content/Patients own words:.  Access/Means?  no    Perceptual Disturbances     Auditory:  nodenies  Hallucinations continued:  denies    Hypnopompic hallucinations? no Patients own words: .  Hypnagogic hallucinations?  no  Patients own words: .    Delusions? no denies  Patients own words: .    Shared Delusion no        Recommendations:     Reviewed with: Dr Mcdaniels  Medically cleared by: Kaylie VAUGHN  Admit to Inpatient Behavioral Health Unit: unsure at this time      Fatmata Lockett RN

## 2022-07-14 NOTE — H&P
Psychiatric & Behavioral Health History & Physical  7/14/2022    --> Source of History: chart review and the patient; staff    --> Chief Complaint: Suicidal Ideation    History of Present Illness:  Ms. Yumiko Lopez is a 27 y.o. female with a concurrent psychiatric history notable for depression.     [facility] after presenting to the ED.  Presents with suicidal ideation. Onset of symptoms was one day ago.  Symptoms have been present on an intermittent basis. Symptoms are associated with depressed mood.  Symptoms are aggravated by marriage problems. Patient's symptom severity is  Intermediate at this time.   Patient reports that level of hopefulness is fair.  Patient's symptoms occur in the context of  telling her he is no longer happy in their relationship.    She states she found that her  told her he wanted a divorce.  Adds it was out of the blue.  She began to have SI w/ thoughts of running her car off a road.  Protective factor of 2 y/o daughter.      She is focused on returning home.  Has a community play this weekend.  Wanting to go stay w/ parents.        Psychiatric Review Of Systems:  --Depression: Denies any recent low mood / anhedonia / guilt; reports fair mood prior to divorce    --Anxiety: Denies any excessive worry or stress    --Psychosis:  Denies AVH or psychotic paranoia    --Nae:  Denies any history consistent with nae or hypomania, including no periods of time with increased energy, goal directed activities in the context of decreased need for sleep, impulsivity, grandiosity that is a discreet change from baseline and life altering during this time.    --PTSD:  Denies any traumatic nightmares or flashbacks      Concurrent Psychiatric History:  --Past neuropsychiatric history:  • none    --Psychiatric Hospitalizations:   • Denies any prior hospitalizations.    --Suicide Attempts:   • Denies any prior suicide attempts.    --Firearm Access: none    --Prior  "Treatment:  • Outpatient: treatment for PPD - PCP    --Prior Medications Trials:  • Zoloft 50mg (is still currently taking)     --History of violence or legal issues:   • Denies significant history of legal issues.    -Abuse/Trauma/Neglect/Exploitation: none reported      Substance Use:   --Caffeine: one cup of coffee per day   --EtOH: a glass of wine 2-3 times a week; denies escalating use         Social History:  --> Patient is from the area and and says she has a supportive group of family and friends. Pt also mentions being passionate about musical theatre    --> Currently living with  and 2 yo daughter  --> Home Stressors:  is unhappy with marriage    --> Employment: pharmacy tech    --> Significant other:  for 5 years. Together for 9 years.  --> Children: one year old daughter    --> Religiosity/Spirituality: describes herself as  \"ocassionally Voodoo\"    Social History     Socioeconomic History   • Marital status:    Tobacco Use   • Smoking status: Never Smoker   • Smokeless tobacco: Never Used   Vaping Use   • Vaping Use: Never used   Substance and Sexual Activity   • Alcohol use: No   • Drug use: No   • Sexual activity: Yes     Partners: Male     Comment: last pap smear 9/27/18 negative          Family History:  Family History   Problem Relation Age of Onset   • Depression Mother    • Hypertension Father    • No Known Problems Sister    • No Known Problems Brother    • No Known Problems Maternal Grandmother    • Colon cancer Maternal Grandfather    • Uterine cancer Paternal Grandmother    • Deep vein thrombosis Other      -->Further details: Family Suicides: none      Past Medical and Surgical History:  Past Medical History:   Diagnosis Date   • Anxiety    • Colitis    • Depression    • Personal history of cardiac arrhythmia    • SVT (supraventricular tachycardia) (HCC)     s/p ablation November 2015   • Thyroid dysfunction      --Reviewed     --> Contraception Use: " OCP.      Past Surgical History:   Procedure Laterality Date   • ANKLE SURGERY  12/01/2010   • APPENDECTOMY  11/2012   • CARDIAC ABLATION  11/2015    for SVT         Allergies:  Tamiflu [oseltamivir phosphate] and Vancomycin      Medications Prior to Admission   Medication Sig Dispense Refill Last Dose   • famotidine (PEPCID) 20 MG tablet TAKE 1 TABLET BY MOUTH TWICE DAILY 60 tablet 4 7/13/2022 at Unknown time   • levonorgestrel-ethinyl estradiol (NORDETTE) 0.15-30 MG-MCG per tablet Take 1 tablet by mouth Daily. 28 tablet 12 7/13/2022 at Unknown time   • levothyroxine (SYNTHROID, LEVOTHROID) 25 MCG tablet TAKE 1 TABLET EVERY DAY 60 tablet 0 7/13/2022 at Unknown time   • meloxicam (Mobic) 7.5 MG tablet Take 1 tablet by mouth Daily for 60 days. 30 tablet 1 Unknown at Unknown time   • ondansetron (ZOFRAN) 4 MG tablet Take 1 tablet by mouth Every 6 (Six) Hours As Needed for Nausea or Vomiting. 30 tablet 2 Unknown at Unknown time   • sertraline (ZOLOFT) 50 MG tablet TAKE 1 TABLET EVERY DAY 30 tablet 1 Unknown at Unknown time   • tiZANidine (ZANAFLEX) 2 MG tablet TAKE 1 TABLET BY MOUTH EVERY 8 HOURS AS NEEDED FOR MUSCLE SPASMS FOR UP TO 14 DAYS 32 tablet 0 Unknown at Unknown time     --> Reviewed; compliant        Medical Review Of Systems:  Review of Systems   Constitutional: Negative for fever.   HENT: Negative for sore throat.    Eyes: Negative for discharge.   Cardiovascular: Negative for cyanosis.   Respiratory: Negative for hemoptysis.    Endocrine: Negative for polydipsia.   Hematologic/Lymphatic: Negative for bleeding problem.   Skin: Negative for rash.   Musculoskeletal: Negative for falls.   Gastrointestinal: Negative for jaundice.   Genitourinary: Negative for dysuria.   Neurological: Negative for seizures.   Psychiatric/Behavioral: The patient is nervous/anxious.    All other systems reviewed and are negative.          Objective   Objective --    Vital Signs:  Temp:  [97.2 °F (36.2 °C)-98.5 °F (36.9 °C)] 98.5  °F (36.9 °C)  Heart Rate:  [61-83] 61  Resp:  [16-18] 16  BP: (129-144)/(80-86) 144/86    Physical Exam:   -General Appearance:  normal general appearance  -Hygiene:  good   -Gait & Station:  Normal  -Musculoskeletal:  No atrophy noted  -Pulm: Unlaboured     Mental Status Exam:   --Cooperation:  Cooperative  --Eye Contact:  Fair  --Psychomotor Behavior:  Appropriate  --Mood:  Sad/Depressed  --Affect:  mood-congruent to mood and thought processing  --Speech:  Normal r/r/v  --Thought Process:  Coherent  --Associations: Goal Directed  --Themes:  Failure  --Thought Content:     --Mood congruent   --Suicidal:  Denies now    --Homicidal:  Denies   --Hallucinations:  Denies   --Delusion:  None noted/overt  --Cognitive Functioning:  -Consciousness: Awake and alert  -Orientation:  Place, Time and Situation  -Attention:  Normal   -Concentration:  Normal  -Language:  Average based on interaction; Intact  -Vocabulary:  Average based on interaction; Intact  -Short Term Memory: Intact  -Long Term Memory: Intact  -Fund of Knowledge:  Average based on interaction  -Abstraction:  Average based on interaction  --Reliability:  adequate  --Insight:  Diminished  --Judgment:  Diminished  --Impulse Control:  Diminished        Diagnostic Data:      -----------------------------------------------------        --> Lab Work  Results source: EMR    Recent Results (from the past 72 hour(s))   Ethanol    Collection Time: 07/13/22  7:16 PM    Specimen: Blood   Result Value Ref Range    Ethanol <10 0 - 10 mg/dL    Ethanol % <0.010 %   hCG, Serum, Qualitative    Collection Time: 07/13/22  7:16 PM    Specimen: Blood   Result Value Ref Range    HCG Qualitative Negative Negative   Comprehensive Metabolic Panel    Collection Time: 07/13/22  7:16 PM    Specimen: Blood   Result Value Ref Range    Glucose 104 (H) 65 - 99 mg/dL    BUN 11 6 - 20 mg/dL    Creatinine 0.66 0.57 - 1.00 mg/dL    Sodium 139 136 - 145 mmol/L    Potassium 3.8 3.5 - 5.2 mmol/L     Chloride 104 98 - 107 mmol/L    CO2 25.0 22.0 - 29.0 mmol/L    Calcium 9.5 8.6 - 10.5 mg/dL    Total Protein 7.3 6.0 - 8.5 g/dL    Albumin 4.60 3.50 - 5.20 g/dL    ALT (SGPT) 24 1 - 33 U/L    AST (SGOT) 25 1 - 32 U/L    Alkaline Phosphatase 84 39 - 117 U/L    Total Bilirubin 0.3 0.0 - 1.2 mg/dL    Globulin 2.7 gm/dL    A/G Ratio 1.7 g/dL    BUN/Creatinine Ratio 16.7 7.0 - 25.0    Anion Gap 10.0 5.0 - 15.0 mmol/L    eGFR 124.2 >60.0 mL/min/1.73   Green Top (Gel)    Collection Time: 07/13/22  7:16 PM   Result Value Ref Range    Extra Tube Hold for add-ons.    Lavender Top    Collection Time: 07/13/22  7:16 PM   Result Value Ref Range    Extra Tube hold for add-on    CBC Auto Differential    Collection Time: 07/13/22  7:16 PM    Specimen: Blood   Result Value Ref Range    WBC 11.26 (H) 3.40 - 10.80 10*3/mm3    RBC 5.00 3.77 - 5.28 10*6/mm3    Hemoglobin 13.0 12.0 - 15.9 g/dL    Hematocrit 39.6 34.0 - 46.6 %    MCV 79.2 79.0 - 97.0 fL    MCH 26.0 (L) 26.6 - 33.0 pg    MCHC 32.8 31.5 - 35.7 g/dL    RDW 13.7 12.3 - 15.4 %    RDW-SD 38.1 37.0 - 54.0 fl    MPV 11.4 6.0 - 12.0 fL    Platelets 341 140 - 450 10*3/mm3    Neutrophil % 70.6 42.7 - 76.0 %    Lymphocyte % 21.7 19.6 - 45.3 %    Monocyte % 6.1 5.0 - 12.0 %    Eosinophil % 0.5 0.3 - 6.2 %    Basophil % 0.6 0.0 - 1.5 %    Immature Grans % 0.5 0.0 - 0.5 %    Neutrophils, Absolute 7.94 (H) 1.70 - 7.00 10*3/mm3    Lymphocytes, Absolute 2.44 0.70 - 3.10 10*3/mm3    Monocytes, Absolute 0.69 0.10 - 0.90 10*3/mm3    Eosinophils, Absolute 0.06 0.00 - 0.40 10*3/mm3    Basophils, Absolute 0.07 0.00 - 0.20 10*3/mm3    Immature Grans, Absolute 0.06 (H) 0.00 - 0.05 10*3/mm3    nRBC 0.0 0.0 - 0.2 /100 WBC   Salicylate Level    Collection Time: 07/13/22  7:16 PM    Specimen: Blood   Result Value Ref Range    Salicylate <0.3 <=30.0 mg/dL   Acetaminophen Level    Collection Time: 07/13/22  7:16 PM    Specimen: Blood   Result Value Ref Range    Acetaminophen <5.0 0.0 - 30.0 mcg/mL    Urine Drug Screen - Urine, Clean Catch    Collection Time: 07/13/22  7:17 PM    Specimen: Urine, Clean Catch   Result Value Ref Range    THC, Screen, Urine Negative Negative    Phencyclidine (PCP), Urine Negative Negative    Cocaine Screen, Urine Negative Negative    Methamphetamine, Ur Negative Negative    Opiate Screen Negative Negative    Amphetamine Screen, Urine Negative Negative    Benzodiazepine Screen, Urine Negative Negative    Tricyclic Antidepressants Screen Negative Negative    Methadone Screen, Urine Negative Negative    Barbiturates Screen, Urine Negative Negative    Oxycodone Screen, Urine Negative Negative    Propoxyphene Screen Negative Negative    Buprenorphine, Screen, Urine Negative Negative   Urinalysis With Microscopic If Indicated (No Culture) - Urine, Clean Catch    Collection Time: 07/13/22  7:17 PM    Specimen: Urine, Clean Catch   Result Value Ref Range    Color, UA Yellow Yellow, Straw, Dark Yellow, Ema    Appearance, UA Clear Clear    pH, UA 6.0 5.0 - 9.0    Specific Gravity, UA 1.034 (H) 1.003 - 1.030    Glucose, UA Negative Negative    Ketones, UA 40 mg/dL (2+) (A) Negative    Bilirubin, UA Negative Negative    Blood, UA Negative Negative    Protein, UA 30 mg/dL (1+) (A) Negative    Leuk Esterase, UA Negative Negative    Nitrite, UA Negative Negative    Urobilinogen, UA 1.0 E.U./dL 0.2 - 1.0 E.U./dL   Light Blue Top    Collection Time: 07/13/22  7:17 PM   Result Value Ref Range    Extra Tube Hold for add-ons.    Urinalysis, Microscopic Only - Urine, Clean Catch    Collection Time: 07/13/22  7:17 PM    Specimen: Urine, Clean Catch   Result Value Ref Range    RBC, UA 0-2 (A) None Seen /HPF    WBC, UA 0-2 None Seen, 0-2, 3-5 /HPF    Bacteria, UA None Seen None Seen /HPF    Squamous Epithelial Cells, UA 0-2 None Seen, 0-2 /HPF    Hyaline Casts, UA 0-2 None Seen /LPF    Methodology Automated Microscopy    COVID-19 and FLU A/B PCR - Swab, Nasopharynx    Collection Time: 07/13/22  8:10 PM     Specimen: Nasopharynx; Swab   Result Value Ref Range    COVID19 Not Detected Not Detected - Ref. Range    Influenza A PCR Not Detected Not Detected    Influenza B PCR Not Detected Not Detected   Glucose, Fasting    Collection Time: 07/14/22  6:16 AM    Specimen: Blood   Result Value Ref Range    Glucose, Fasting 100 74 - 106 mg/dL   Lipid Panel    Collection Time: 07/14/22  6:16 AM    Specimen: Blood   Result Value Ref Range    Total Cholesterol 163 0 - 200 mg/dL    Triglycerides 91 0 - 150 mg/dL    HDL Cholesterol 43 40 - 60 mg/dL    LDL Cholesterol  103 (H) 0 - 100 mg/dL    VLDL Cholesterol 17 5 - 40 mg/dL    LDL/HDL Ratio 2.37        No results found.    Lab Results   Component Value Date    GLUF 100 07/14/2022        Lab Results   Component Value Date    HGBA1C 5.20 10/16/2020       Lab Results   Component Value Date    CHOL 163 07/14/2022    TRIG 91 07/14/2022    HDL 43 07/14/2022     (H) 07/14/2022    VLDL 17 07/14/2022    LDLHDL 2.37 07/14/2022        TSH   Date Value Ref Range Status   04/20/2022 2.070 0.270 - 4.200 uIU/mL Final       No results found for: THOH47QH, PPMTLMYM76, FOLATE    No results found for: IRON, TIBC, FERRITIN    Estimated Creatinine Clearance: 134.4 mL/min (by C-G formula based on SCr of 0.66 mg/dL).          Assessment & Plan   --Patient Strengths: capable of independent living, communication skills, compliant with medication   --Patient Barriers: low self esteem, marital/family conflict      --Diagnostic Impression: Ms. Lopez  is a 27 y.o. female admitted for suicidal ideation, constituting an imminent risk of harm to self  necessitating inpatient psychiatric stabilization and treatment.     Diagnostically,  Pt presents with symptoms of adjustment disorder.         Assessment:  --  Adjustment disorder with mixed anxiety and depressed mood    Ineffective coping    Relationship problems     Non-Psychiatric Medical Conditions Include:  --Hypothyroidism: cont Synthroid 25mcg  daily  --SVT: stable  --GERD; Stable; Pepcid BID         Treatment Plan:  1) Will admit patient to the behavioral health unit at [facility], adult behavioral health unit, as a voluntary admission to ensure patient safety given imminent risk status and need for emergent inpatient stabilization and treatment.    2) Patient will be provided treatment with the unit milieu, activities, therapies and psychopharmacological management.    3) Patient placed on  Q15 minute checks and Suicide precautions.    4) Hospitalist consulted for assistance in management of medical comorbidities.    5) Will order following labs:   --Fasting lipids & glucose to establish baseline for any anti-d2 agent therapy    6) Will restart patient on the following psychiatric home meds:   --Zoloft     7) Will make the following medication changes, and proceed with the following treatment planning:   --Declines further changes    8) Will begin discharge planning as appropriate for patient.    9) Psychotherapy provided: as below.     All questions answered for the patient.  Treatment plan and medication risks and benefits discussed with: Patient.      Estimated Length of Stay: 1-2 days  Prognosis: Fair    I have personally performed the services described in this documentation as scribed by the medical student in my presence, and it is both accurate and complete.  I have made additions and updates as indicated.      Manuel Mcdaniels II, MD  07/14/22 @ 22:35 CDT        Psychotherapy Note  --Total Psychotherapy Time: 17 minutes  --Participants: Patient, myself, MS3 Breeding  --Current Clinical Status: depressed & anxious  --Focus of Therapeutic Encounter: schema, education, insight  --Intervention Type: Supportive, CBT/cognitive, Psycho-Educational and Insight Focused  --Therapy notes: I provided reflective listening, supportive therapy, reflection, and allowed them to express affect in therapy course.  Cognitive behavioral therapy  targeting schema burden, distortions - identifying and challenging these.  Educational re: dx and tx options.  Insight to situation.    --DX: as above  --Plan: Continue to work on developing & strengthening coping skills; correcting maladaptive schema; insight; education.  Rapport.    --Post therapy status: improving affect and insight         Addendum:  Update for coding/billing purposes above.       Manuel Mcdaniels II, MD  08/10/22 @ 10:30 AM CDT

## 2022-07-15 VITALS
SYSTOLIC BLOOD PRESSURE: 123 MMHG | RESPIRATION RATE: 18 BRPM | TEMPERATURE: 98.1 F | OXYGEN SATURATION: 98 % | WEIGHT: 190 LBS | HEIGHT: 63 IN | HEART RATE: 92 BPM | DIASTOLIC BLOOD PRESSURE: 86 MMHG | BODY MASS INDEX: 33.66 KG/M2

## 2022-07-15 PROCEDURE — 90847 FAMILY PSYTX W/PT 50 MIN: CPT | Performed by: PSYCHIATRY & NEUROLOGY

## 2022-07-15 PROCEDURE — 99239 HOSP IP/OBS DSCHRG MGMT >30: CPT | Performed by: PSYCHIATRY & NEUROLOGY

## 2022-07-15 RX ORDER — HYDROXYZINE PAMOATE 50 MG/1
50 CAPSULE ORAL 3 TIMES DAILY PRN
Qty: 30 CAPSULE | Refills: 1 | Status: SHIPPED | OUTPATIENT
Start: 2022-07-15 | End: 2022-11-02

## 2022-07-15 RX ADMIN — LEVOTHYROXINE SODIUM 25 MCG: 25 TABLET ORAL at 08:45

## 2022-07-15 RX ADMIN — SERTRALINE 50 MG: 50 TABLET, FILM COATED ORAL at 08:45

## 2022-07-15 RX ADMIN — FAMOTIDINE 20 MG: 20 TABLET ORAL at 08:45

## 2022-07-15 NOTE — DISCHARGE SUMMARY
--Admission Date: 7/13/2022    --Discharge Date: 7/15/2022      Discharging Diagnoses:    Adjustment disorder with mixed anxiety and depressed mood    Ineffective coping    Relationship problems        Psychiatric History & Reason for Hospitalization:   Chief Complaint: Suicidal Ideation     History of Present Illness:  Ms. Yumiko Lopez is a 27 y.o. female with a concurrent psychiatric history notable for depression.      Admitted to the [facility] after presenting to the ED.  Presents with suicidal ideation. Onset of symptoms was one day ago.  Symptoms have been present on an intermittent basis. Symptoms are associated with depressed mood.  Symptoms are aggravated by marriage problems. Patient's symptom severity is  Intermediate at this time.   Patient reports that level of hopefulness is fair.  Patient's symptoms occur in the context of  telling her he is no longer happy in their relationship.     She states she found that her  told her he wanted a divorce.  Adds it was out of the blue.  She began to have SI w/ thoughts of running her car off a road.  Protective factor of 2 y/o daughter.       She is focused on returning home.  Has a community play this weekend.  Wanting to go stay w/ parents.          Psychiatric Review Of Systems:  --Depression: Denies any recent low mood / anhedonia / guilt; reports fair mood prior to divorce     --Anxiety: Denies any excessive worry or stress     --Psychosis:  Denies AVH or psychotic paranoia     --Nae:  Denies any history consistent with nae or hypomania, including no periods of time with increased energy, goal directed activities in the context of decreased need for sleep, impulsivity, grandiosity that is a discreet change from baseline and life altering during this time.     --PTSD:  Denies any traumatic nightmares or flashbacks        Concurrent Psychiatric History:  --Past neuropsychiatric history:  · none     --Psychiatric Hospitalizations:   · Denies  "any prior hospitalizations.     --Suicide Attempts:   · Denies any prior suicide attempts.     --Firearm Access: none     --Prior Treatment:  · Outpatient: treatment for PPD - PCP     --Prior Medications Trials:  · Zoloft 50mg (is still currently taking)      --History of violence or legal issues:   · Denies significant history of legal issues.     -Abuse/Trauma/Neglect/Exploitation: none reported        Substance Use:   --Caffeine: one cup of coffee per day   --EtOH: a glass of wine 2-3 times a week; denies escalating use           Social History:  --> Patient is from the area and and says she has a supportive group of family and friends. Pt also mentions being passionate about musical theatre     --> Currently living with  and 2 yo daughter  --> Home Stressors:  is unhappy with marriage     --> Employment: pharmacy tech     --> Significant other:  for 5 years. Together for 9 years.  --> Children: one year old daughter     --> Religiosity/Spirituality: describes herself as  \"ocassionally Pentecostal\"     Social History   Social History            Socioeconomic History   • Marital status:    Tobacco Use   • Smoking status: Never Smoker   • Smokeless tobacco: Never Used   Vaping Use   • Vaping Use: Never used   Substance and Sexual Activity   • Alcohol use: No   • Drug use: No   • Sexual activity: Yes       Partners: Male       Comment: last pap smear 9/27/18 negative                Family History:        Family History   Problem Relation Age of Onset   • Depression Mother     • Hypertension Father     • No Known Problems Sister     • No Known Problems Brother     • No Known Problems Maternal Grandmother     • Colon cancer Maternal Grandfather     • Uterine cancer Paternal Grandmother     • Deep vein thrombosis Other        -->Further details: Family Suicides: none        Past Medical and Surgical History:  Medical History        Past Medical History:   Diagnosis Date   • Anxiety     • " Colitis     • Depression     • Personal history of cardiac arrhythmia     • SVT (supraventricular tachycardia) (HCC)       s/p ablation November 2015   • Thyroid dysfunction           --Reviewed      --> Contraception Use: OCP.        Surgical History         Past Surgical History:   Procedure Laterality Date   • ANKLE SURGERY   12/01/2010   • APPENDECTOMY   11/2012   • CARDIAC ABLATION   11/2015     for SVT               Allergies:  Tamiflu [oseltamivir phosphate] and Vancomycin        Prescriptions Prior to Admission           Medications Prior to Admission   Medication Sig Dispense Refill Last Dose   • famotidine (PEPCID) 20 MG tablet TAKE 1 TABLET BY MOUTH TWICE DAILY 60 tablet 4 7/13/2022 at Unknown time   • levonorgestrel-ethinyl estradiol (NORDETTE) 0.15-30 MG-MCG per tablet Take 1 tablet by mouth Daily. 28 tablet 12 7/13/2022 at Unknown time   • levothyroxine (SYNTHROID, LEVOTHROID) 25 MCG tablet TAKE 1 TABLET EVERY DAY 60 tablet 0 7/13/2022 at Unknown time   • meloxicam (Mobic) 7.5 MG tablet Take 1 tablet by mouth Daily for 60 days. 30 tablet 1 Unknown at Unknown time   • ondansetron (ZOFRAN) 4 MG tablet Take 1 tablet by mouth Every 6 (Six) Hours As Needed for Nausea or Vomiting. 30 tablet 2 Unknown at Unknown time   • sertraline (ZOLOFT) 50 MG tablet TAKE 1 TABLET EVERY DAY 30 tablet 1 Unknown at Unknown time   • tiZANidine (ZANAFLEX) 2 MG tablet TAKE 1 TABLET BY MOUTH EVERY 8 HOURS AS NEEDED FOR MUSCLE SPASMS FOR UP TO 14 DAYS 32 tablet 0 Unknown at Unknown time         --> Reviewed; compliant                Diagnostic Data:    --Labs:   Results source: EMR  Recent Results (from the past 168 hour(s))   Ethanol    Collection Time: 07/13/22  7:16 PM    Specimen: Blood   Result Value Ref Range    Ethanol <10 0 - 10 mg/dL    Ethanol % <0.010 %   hCG, Serum, Qualitative    Collection Time: 07/13/22  7:16 PM    Specimen: Blood   Result Value Ref Range    HCG Qualitative Negative Negative   Comprehensive  Metabolic Panel    Collection Time: 07/13/22  7:16 PM    Specimen: Blood   Result Value Ref Range    Glucose 104 (H) 65 - 99 mg/dL    BUN 11 6 - 20 mg/dL    Creatinine 0.66 0.57 - 1.00 mg/dL    Sodium 139 136 - 145 mmol/L    Potassium 3.8 3.5 - 5.2 mmol/L    Chloride 104 98 - 107 mmol/L    CO2 25.0 22.0 - 29.0 mmol/L    Calcium 9.5 8.6 - 10.5 mg/dL    Total Protein 7.3 6.0 - 8.5 g/dL    Albumin 4.60 3.50 - 5.20 g/dL    ALT (SGPT) 24 1 - 33 U/L    AST (SGOT) 25 1 - 32 U/L    Alkaline Phosphatase 84 39 - 117 U/L    Total Bilirubin 0.3 0.0 - 1.2 mg/dL    Globulin 2.7 gm/dL    A/G Ratio 1.7 g/dL    BUN/Creatinine Ratio 16.7 7.0 - 25.0    Anion Gap 10.0 5.0 - 15.0 mmol/L    eGFR 124.2 >60.0 mL/min/1.73   Green Top (Gel)    Collection Time: 07/13/22  7:16 PM   Result Value Ref Range    Extra Tube Hold for add-ons.    Lavender Top    Collection Time: 07/13/22  7:16 PM   Result Value Ref Range    Extra Tube hold for add-on    CBC Auto Differential    Collection Time: 07/13/22  7:16 PM    Specimen: Blood   Result Value Ref Range    WBC 11.26 (H) 3.40 - 10.80 10*3/mm3    RBC 5.00 3.77 - 5.28 10*6/mm3    Hemoglobin 13.0 12.0 - 15.9 g/dL    Hematocrit 39.6 34.0 - 46.6 %    MCV 79.2 79.0 - 97.0 fL    MCH 26.0 (L) 26.6 - 33.0 pg    MCHC 32.8 31.5 - 35.7 g/dL    RDW 13.7 12.3 - 15.4 %    RDW-SD 38.1 37.0 - 54.0 fl    MPV 11.4 6.0 - 12.0 fL    Platelets 341 140 - 450 10*3/mm3    Neutrophil % 70.6 42.7 - 76.0 %    Lymphocyte % 21.7 19.6 - 45.3 %    Monocyte % 6.1 5.0 - 12.0 %    Eosinophil % 0.5 0.3 - 6.2 %    Basophil % 0.6 0.0 - 1.5 %    Immature Grans % 0.5 0.0 - 0.5 %    Neutrophils, Absolute 7.94 (H) 1.70 - 7.00 10*3/mm3    Lymphocytes, Absolute 2.44 0.70 - 3.10 10*3/mm3    Monocytes, Absolute 0.69 0.10 - 0.90 10*3/mm3    Eosinophils, Absolute 0.06 0.00 - 0.40 10*3/mm3    Basophils, Absolute 0.07 0.00 - 0.20 10*3/mm3    Immature Grans, Absolute 0.06 (H) 0.00 - 0.05 10*3/mm3    nRBC 0.0 0.0 - 0.2 /100 WBC   Salicylate Level     Collection Time: 07/13/22  7:16 PM    Specimen: Blood   Result Value Ref Range    Salicylate <0.3 <=30.0 mg/dL   Acetaminophen Level    Collection Time: 07/13/22  7:16 PM    Specimen: Blood   Result Value Ref Range    Acetaminophen <5.0 0.0 - 30.0 mcg/mL   Urine Drug Screen - Urine, Clean Catch    Collection Time: 07/13/22  7:17 PM    Specimen: Urine, Clean Catch   Result Value Ref Range    THC, Screen, Urine Negative Negative    Phencyclidine (PCP), Urine Negative Negative    Cocaine Screen, Urine Negative Negative    Methamphetamine, Ur Negative Negative    Opiate Screen Negative Negative    Amphetamine Screen, Urine Negative Negative    Benzodiazepine Screen, Urine Negative Negative    Tricyclic Antidepressants Screen Negative Negative    Methadone Screen, Urine Negative Negative    Barbiturates Screen, Urine Negative Negative    Oxycodone Screen, Urine Negative Negative    Propoxyphene Screen Negative Negative    Buprenorphine, Screen, Urine Negative Negative   Urinalysis With Microscopic If Indicated (No Culture) - Urine, Clean Catch    Collection Time: 07/13/22  7:17 PM    Specimen: Urine, Clean Catch   Result Value Ref Range    Color, UA Yellow Yellow, Straw, Dark Yellow, Ema    Appearance, UA Clear Clear    pH, UA 6.0 5.0 - 9.0    Specific Gravity, UA 1.034 (H) 1.003 - 1.030    Glucose, UA Negative Negative    Ketones, UA 40 mg/dL (2+) (A) Negative    Bilirubin, UA Negative Negative    Blood, UA Negative Negative    Protein, UA 30 mg/dL (1+) (A) Negative    Leuk Esterase, UA Negative Negative    Nitrite, UA Negative Negative    Urobilinogen, UA 1.0 E.U./dL 0.2 - 1.0 E.U./dL   Light Blue Top    Collection Time: 07/13/22  7:17 PM   Result Value Ref Range    Extra Tube Hold for add-ons.    Urinalysis, Microscopic Only - Urine, Clean Catch    Collection Time: 07/13/22  7:17 PM    Specimen: Urine, Clean Catch   Result Value Ref Range    RBC, UA 0-2 (A) None Seen /HPF    WBC, UA 0-2 None Seen, 0-2, 3-5 /HPF     Bacteria, UA None Seen None Seen /HPF    Squamous Epithelial Cells, UA 0-2 None Seen, 0-2 /HPF    Hyaline Casts, UA 0-2 None Seen /LPF    Methodology Automated Microscopy    COVID-19 and FLU A/B PCR - Swab, Nasopharynx    Collection Time: 07/13/22  8:10 PM    Specimen: Nasopharynx; Swab   Result Value Ref Range    COVID19 Not Detected Not Detected - Ref. Range    Influenza A PCR Not Detected Not Detected    Influenza B PCR Not Detected Not Detected   Glucose, Fasting    Collection Time: 07/14/22  6:16 AM    Specimen: Blood   Result Value Ref Range    Glucose, Fasting 100 74 - 106 mg/dL   Lipid Panel    Collection Time: 07/14/22  6:16 AM    Specimen: Blood   Result Value Ref Range    Total Cholesterol 163 0 - 200 mg/dL    Triglycerides 91 0 - 150 mg/dL    HDL Cholesterol 43 40 - 60 mg/dL    LDL Cholesterol  103 (H) 0 - 100 mg/dL    VLDL Cholesterol 17 5 - 40 mg/dL    LDL/HDL Ratio 2.37        No results found for: KDGD63WR, ECFNYGGY66, FOLATE    Lab Results   Component Value Date    GLUF 100 07/14/2022        Lab Results   Component Value Date    CHOL 163 07/14/2022    TRIG 91 07/14/2022    HDL 43 07/14/2022     (H) 07/14/2022    VLDL 17 07/14/2022    LDLHDL 2.37 07/14/2022        TSH   Date Value Ref Range Status   04/20/2022 2.070 0.270 - 4.200 uIU/mL Final         --Imaging:  No results found.      Summary of Hospital Course:     Ms. Yumiko Lopez was admitted to the behavioral health unit at [facility] to ensure patient safety; provided treatment with the unit milieu, activities, therapies and psychopharmacological management.      Yumiko Lopez was placed on Q15 minute checks and Suicide.     Hospitalist was consulted for management of medical co-morbidities.      Patient was restarted on the following psychiatric medications:   --Zoloft      The following medication changes were made during the hospital stay:   --No changes; pt declined.  Vistaril PRN was used.     Ms.  Yumiko Lopez had improvement over the course of the hospital stay and tolerated medications well.  Suicidal ideation resolved and these gains were maintained during stay.  No behavioral issues.       She had a family session with her mom.  No safety concerns.  Reviewed increased risk of suicide after d/c.  Discussed and encouraged securing all medications and use of weekly pill planner.  Securing firearms and weapons.  Encouraged to keep working on communication.  Critical need for outpatient follow-up.  24/7 care here reviewed.  All questions answered.      Substance abuse issues were not present.       Denies firearm access.  Parents have guns that are secured in safe and she is w/out access.  No guns at her home.     At time of interview, Ms. Yumiko Lopez adamantly denies any thoughts of death or dying.  Adamantly and convincingly denies any nihilism, suicidal ideations, intentions or plans/planning.  Denies any homicidal ideations, intentions or plans/planning.  Denies any auditory or visual hallucinations.  Denies paranoia; none overt.  Not grossly psychotic.  Ms. Yumiko Lopez does not constitute an imminent risk of harm to self or others, at time of the interview.  Therefore, does not meet commitment criteria at this time.    We have recommended she stay  w/ mom and dad after d/c and she is agreeable.         Risk Assessment & Patient's Condition at Discharge --  Currently, Ms. Yumiko Lopez is not suicidal, feels fairly hopeful about the future, and has made some specific future plans like seeing her daughter, working in a community play and moving forward with her life.  Protective factors identified include: family within the home; a sense of responsibility to self and family; positive support from family; resourcefulness; Restorationist chaitanya; she is not psychotic nor agitated; sober; future orientated; has access to care; has and is agreeable to outpatient follow-up.     However,  "given history of impulsiveness, periods of helplessness, marital stress,  ethnicity: it is probable that she will attempt suicide again or act impulsively at some point in life when stressed.  Unfortunately, this is a function of future acute stressors -- stressors over which I have no current control and not how she feels right now.    As she is not currently suicidal, our responsibility is to help decrease risk as best as possible.  The best way to help is to refer for intensive therapy and psychiatric visits for long-term follow-up so she can have somewhere to go and someone to manage as symptoms and stressors develop.     We discussed a crisis plan for future suicidality: at the first sign of distress Yumiko Lopez will call or talk to her mom; if this is not sufficient, or they are not available, she will engage in reading or leisure activities, and then mindfulness and calling crisis or U. In addition, Ms. Yumiko Lopez voiced understanding by use of the teachback method of this strategy as well as the 24/7 care available in the  ED.    Contact information provided.    Currently, she is stable for discharge and is not an imminent threat to self or others.             Discharging Exam:    Targeted PE:  -General: in NAD  -MSK: Well nourished in appearence and well developed.  No noted/overt atrophy.    -Gait unremarkable.  -Neuro: No tremor    MSE:   -GENERAL: In NAD; appears approx. biological age.  -APPEARENCE: Adequately-groomed; fair hygiene.    -BEHAVIOR: Calm, cooperative, friendly, and socially appropriate. Made good eye contact. No marked psychomotor retardation/agitation appreciated. No tardive movements.   -SPEECH: Normal in tone, volume, betty, and quality. No dysarthria nor overtly pressured speech noted. No paraphasic errors or neologisms appreciable.  -MOOD: \"OK\"   -AFFECT: Mood appears congruent with thought processing. Range is fair  -THOUGHT PROCESS & ASSOCIATIONS: " Appears linear, logical and goal oriented. Causality appears intact. No FoI, Kenton, or fixations noted.  -THOUGHT CONTENT: Denies suicidal or homicidal ideations. Without overt delusions or paranoia. No psychotic distortions.   -THEMES: self improvement  -PERCEPTIONS: Denies auditory/visual hallucinations. Did not appear to be responding to internal stimuli. No overt psychosis.  -COGNITIVE/EXECUTIVE FUNCTIONS: Alert & Orientated x 4.  Concentration and attention are adequate and improving.  Language & vocabulary are adequate.  Both recent & remote memory appears adequate based upon interaction during interview.  Adequate fund of knowledge. Abstraction generally intact to interview.    -RELIABILITY: adequate.   -IMPULSE CONTROL: adequate.  -INSIGHT/JUDGMENT: Improving.          Discharge Medications:      Your medication list      START taking these medications      Instructions Last Dose Given Next Dose Due   hydrOXYzine pamoate 50 MG capsule  Commonly known as: VISTARIL      Take 1 capsule by mouth 3 (Three) Times a Day As Needed for Anxiety. Indications: Feeling Anxious, Feeling Tense          CONTINUE taking these medications      Instructions Last Dose Given Next Dose Due   famotidine 20 MG tablet  Commonly known as: PEPCID      TAKE 1 TABLET BY MOUTH TWICE DAILY       levonorgestrel-ethinyl estradiol 0.15-30 MG-MCG per tablet  Commonly known as: NORDETTE      Take 1 tablet by mouth Daily.       levothyroxine 25 MCG tablet  Commonly known as: SYNTHROID, LEVOTHROID      TAKE 1 TABLET EVERY DAY       meloxicam 7.5 MG tablet  Commonly known as: Mobic      Take 1 tablet by mouth Daily for 60 days.       ondansetron 4 MG tablet  Commonly known as: ZOFRAN      Take 1 tablet by mouth Every 6 (Six) Hours As Needed for Nausea or Vomiting.       sertraline 50 MG tablet  Commonly known as: ZOLOFT      TAKE 1 TABLET EVERY DAY       tiZANidine 2 MG tablet  Commonly known as: ZANAFLEX      TAKE 1 TABLET BY MOUTH EVERY 8 HOURS  AS NEEDED FOR MUSCLE SPASMS FOR UP TO 14 DAYS             Where to Get Your Medications      These medications were sent to [facility] [place], [place] - [address] - [phone] [facility] [phone]   [address] [address]    Phone: [phone]   · hydrOXYzine pamoate 50 MG capsule         Justification for multiple antipsychotic medications at discharge:    --Not Applicable.  Medication for smoking cessation:   --Patient does not smoke and medication is not indicated.  Medication for substance abuse:   --Patient does not have a substance use diagnosis and medication is not indicated.    Discharge Diet:   As per pre-admission.  Activity Level:   As per pre-admission.    Disposition: Patient was discharged home with family    Outpatient Follow-Up:   Follow-up Information     Romana Daniel MD .    Specialty: Family Medicine  Contact information:  [address]   [place] [address]  [phone]             [facility]. Schedule an appointment as soon as possible for a visit in 1 week(s).    Why: Make an appointment as needed for followup care regarding OBGYN services  Please bring insurance card and ID  Contact information:  [address] [place] [address] Dr [address] Floor  [facility] [address] [phone]           [facility] Go on 7/18/2022.    Why: You have an appointment on Monday July 18, 2022 at 9am   If this does not work for you, you may use, open access Monday-Friday, From 8:30am-4pm   Please bring insurance card and ID  Contact information:  [address] [place] [address]  [phone]           [facility]. Schedule an appointment as soon as possible for a visit in 1 week(s).    Why: You need to call and make an appointment for followup couples counseling. Please bring insurance card and ID  Contact information:  [address]  [facility] [address]  [phone]                       --Psychiatric &  Behavioral Health follow up will be with [facility].    --Non-Psychiatric Medical follow up will be with Primary Care.      Time Spent: More than 30 minutes.  I spent 33 minutes on this discharge activity which included: face to face encounter with the patient, reviewing the data in the system, coordination of the care with the nursing staff as well as consultants, documentation, and entering orders.  Time is independent of psychotherapy, as noted below.       Manuel Mcdaniels II, MD  07/24/22  21:17 CDT      Family Psychotherapy Note  --Total Psychotherapy Time: 30 minutes  --Participants: Patient, myself, pt's mom, CATHI Suazo  --Participation: Active by all  --Contributions: Significant by all  --Current Clinical Status: improving mood  --Focus of Therapeutic Encounter: schema, insight, communication  --Intervention Type: Supportive, CBT/cognitive, Psycho-Educational and Insight Focused  --Therapy notes: I provided reflective listening, supportive therapy, reflection, and allowed them to express emotions (e.g. Frustration, anxiety) in the course of therapy.  Discussed patient's history, treatment considerations, disposition planning.  Work on insight.  Work on communication and family dyanmics.  Education and others as above.   --DX: as above  --Plan: Continue to work on developing & strengthening coping skills; correcting maladaptive schema and cognitive disortions; communication dynamics; work on insight.    --Reactions: Positive reaction and engagement by all  --Post therapy status: improving affect and insight into family dynamic.

## 2022-07-15 NOTE — NURSING NOTE
Pt discharged from [facility] to home via personal vehicle. Pt is stable without s/s of distress. Personal belongings returned. Pt signed and understands discharge instructions.

## 2022-07-15 NOTE — DISCHARGE INSTRUCTIONS
--Ensure that all mediations (including over the counter meds) are secured in a lock box and that you use a weekly pill planner.    --Ensure all weapons and sharps if present (including firearms, knives, razors) are secured (ideally in a gun safe or removed from home) and all ammo is secured and stored separately.    --Continue medications as currently prescribed.  --Continue follow-up with outpatient providers.  --Please contact our Behavioral Health Unit with any questions or concerns at [phone].  --Return to the ER with any suicidal or homicidal ideation, or worsening symptoms.    --The number for the National Suicide & Crisis Hotline is 1-465.934.6076.  The National Crisis Text number is 108-822.  These may be contacted at any time, if needed.  -Website of Resources for Therapy Workbooks: https://www.cci.health.[place].gov.au/Resources/Overview

## 2022-07-15 NOTE — PROGRESS NOTES
"Attended family session with Dr. Mcdaniels, pt and pt's mother Kimberli Mazariegos ([phone]). Discussed plan for returning home. Pt will be going home to her house with spouse and daughter. Discussed if this does not work out, pt can go to mother's house.  Pt denies SI\HI\AVH at this time. Pt denies depression and anxiety and states, \"I feel great.\"    Pt will follow-up with  Sp for after care therapy services and would like to be referred to [facility] for marital counseling. Discussed the importance of following up with mental health providers. Discussed with pt the crisis line and provided pt with number to access. The number for the National Suicide & Crisis Hotline is 1-180.945.8244.  The National Crisis Text number is 258280.  Discussed with pt learned coping skills from attending groups and ind sessions. Pt verbalized they would use the following coping skills in need, deep breathing, walking away, calling a friend, going to the ED, and using the crisis line pr reading a book. Assisted patient in identifying risk factors which would indicate the need for higher level of care including thoughts to harm self or others and/or self-harming behavior and encouraged patient to  call 911, or present to the nearest emergency room should any of these events occur. Discussed crisis intervention services and means to access.  Patient adamantly and convincingly denies current suicidal or homicidal ideation or perceptual disturbance.Pt does not have access to fire arms/weapons or hetal, verified through safety planning. Safety planning completed with both pt and her mother, Kimberli.  Pt verbalized understanding of topics discussed. Pt had no further questions for this provider.   "

## 2022-07-15 NOTE — NURSING NOTE
"Behavior   Note any precipitants to event or behavior   Describe level and action of any aggressive behavior or speech and associated interventions.     Anxiety: Patient denies at this time  Depression: Patient denies at this time  Pain  0  AVH   no  S/I   no  Plan  no  H/I   no  Plan  no    Affect   euthymic/normal    Note:  Patient presents with a euthymic affect and good eye contact.  Patient denies anxiety, depression, SI, HI, and AVH.  Patient reports \"I am feeling good.\"  Patient reports no issues with sleep or appetite.  Patient denies pain, discomfort, or any new medical problems.  Patient med compliant and cooperative.  Patient interacted with select peers and staff appropriately and participated in unit activities.  Patient has had no behavioral issues so far this shift.  Patient voices no concerns at this time.  Will continue to monitor.      Intervention    PRN medication utilized:  no    Instructed in medication usage and effects  Medications administered as ordered  Encouraged to verbalize needs      Response    Verbalized understanding   Did patient take medications as ordered yes  Did patient interact with assessment?  yes     Plan    Will monitor for safety  Will monitor every 15 minutes as ordered  Will evaluate and promote the plan of care    Last BM:  unknown date  (Please chart in I/O as well)    "

## 2022-07-15 NOTE — PLAN OF CARE
Goal Outcome Evaluation:  Plan of Care Reviewed With: patient  Patient Agreement with Plan of Care: agrees     Progress: improving  Outcome Evaluation: Patient denies SI/HI/AVH.  Patient calm and cooperative with no behavioral issues.  Patient required no prns during shift.  Patient observed getting 6.5 hours of sleep so far this shift.

## 2022-08-26 RX ORDER — LEVOTHYROXINE SODIUM 0.03 MG/1
TABLET ORAL
Qty: 30 TABLET | Refills: 0 | Status: SHIPPED | OUTPATIENT
Start: 2022-08-26 | End: 2022-09-22

## 2022-09-06 DIAGNOSIS — G89.29 CHRONIC RIGHT SHOULDER PAIN: ICD-10-CM

## 2022-09-06 DIAGNOSIS — M25.511 CHRONIC RIGHT SHOULDER PAIN: ICD-10-CM

## 2022-09-06 RX ORDER — TIZANIDINE 2 MG/1
TABLET ORAL
Qty: 10 TABLET | Refills: 0 | Status: SHIPPED | OUTPATIENT
Start: 2022-09-06 | End: 2022-11-02 | Stop reason: SDUPTHER

## 2022-09-22 DIAGNOSIS — F32.A DEPRESSION, UNSPECIFIED DEPRESSION TYPE: ICD-10-CM

## 2022-09-22 DIAGNOSIS — K21.9 GASTROESOPHAGEAL REFLUX DISEASE: ICD-10-CM

## 2022-09-22 RX ORDER — FAMOTIDINE 20 MG/1
TABLET, FILM COATED ORAL
Qty: 60 TABLET | Refills: 3 | Status: SHIPPED | OUTPATIENT
Start: 2022-09-22 | End: 2023-01-20

## 2022-09-22 RX ORDER — LEVOTHYROXINE SODIUM 0.03 MG/1
TABLET ORAL
Qty: 30 TABLET | Refills: 0 | Status: SHIPPED | OUTPATIENT
Start: 2022-09-22 | End: 2022-10-25 | Stop reason: SDUPTHER

## 2022-09-28 DIAGNOSIS — F32.A DEPRESSION, UNSPECIFIED DEPRESSION TYPE: ICD-10-CM

## 2022-10-25 RX ORDER — LEVOTHYROXINE SODIUM 0.03 MG/1
25 TABLET ORAL DAILY
Qty: 30 TABLET | Refills: 0 | Status: SHIPPED | OUTPATIENT
Start: 2022-10-25 | End: 2022-11-02 | Stop reason: SDUPTHER

## 2022-10-25 NOTE — TELEPHONE ENCOUNTER
Incoming Refill Request      Medication requested (name and dose):   levothyroxine (SYNTHROID, LEVOTHROID) 25 MCG tablet    Pharmacy where request should be sent: [facility]    Additional details provided by patient: Patient has an appt on 11/2/2022    Best call back number: [phone]    Does the patient have less than a 3 day supply:  [x] Yes  [] No    Veronica Germain  10/25/22, 09:54 CDT

## 2022-11-02 ENCOUNTER — OFFICE VISIT (OUTPATIENT)
Dept: FAMILY MEDICINE CLINIC | Facility: CLINIC | Age: 27
End: 2022-11-02

## 2022-11-02 ENCOUNTER — LAB (OUTPATIENT)
Dept: LAB | Facility: HOSPITAL | Age: 27
End: 2022-11-02

## 2022-11-02 VITALS
OXYGEN SATURATION: 99 % | SYSTOLIC BLOOD PRESSURE: 120 MMHG | DIASTOLIC BLOOD PRESSURE: 60 MMHG | WEIGHT: 185 LBS | HEART RATE: 79 BPM | TEMPERATURE: 96.6 F | BODY MASS INDEX: 32.78 KG/M2 | HEIGHT: 63 IN

## 2022-11-02 DIAGNOSIS — M25.511 CHRONIC RIGHT SHOULDER PAIN: Primary | ICD-10-CM

## 2022-11-02 DIAGNOSIS — G89.29 CHRONIC RIGHT SHOULDER PAIN: Primary | ICD-10-CM

## 2022-11-02 DIAGNOSIS — R53.83 OTHER FATIGUE: ICD-10-CM

## 2022-11-02 DIAGNOSIS — E03.9 HYPOTHYROIDISM, UNSPECIFIED TYPE: ICD-10-CM

## 2022-11-02 DIAGNOSIS — M54.2 NECK PAIN: ICD-10-CM

## 2022-11-02 DIAGNOSIS — F32.A DEPRESSION, UNSPECIFIED DEPRESSION TYPE: ICD-10-CM

## 2022-11-02 LAB — HBA1C MFR BLD: 5.6 % (ref 4.8–5.6)

## 2022-11-02 PROCEDURE — 36415 COLL VENOUS BLD VENIPUNCTURE: CPT

## 2022-11-02 PROCEDURE — 83036 HEMOGLOBIN GLYCOSYLATED A1C: CPT

## 2022-11-02 PROCEDURE — 99213 OFFICE O/P EST LOW 20 MIN: CPT | Performed by: STUDENT IN AN ORGANIZED HEALTH CARE EDUCATION/TRAINING PROGRAM

## 2022-11-02 PROCEDURE — 82306 VITAMIN D 25 HYDROXY: CPT

## 2022-11-02 PROCEDURE — 80076 HEPATIC FUNCTION PANEL: CPT

## 2022-11-02 RX ORDER — TIZANIDINE 2 MG/1
TABLET ORAL
Qty: 30 TABLET | Refills: 0 | Status: SHIPPED | OUTPATIENT
Start: 2022-11-02 | End: 2022-11-03 | Stop reason: SDUPTHER

## 2022-11-02 RX ORDER — LEVOTHYROXINE SODIUM 0.03 MG/1
25 TABLET ORAL DAILY
Qty: 90 TABLET | Refills: 1 | Status: SHIPPED | OUTPATIENT
Start: 2022-11-02

## 2022-11-02 RX ORDER — TIZANIDINE 2 MG/1
TABLET ORAL
Qty: 10 TABLET | Refills: 0 | Status: CANCELLED | OUTPATIENT
Start: 2022-11-02

## 2022-11-03 ENCOUNTER — TELEPHONE (OUTPATIENT)
Dept: FAMILY MEDICINE CLINIC | Facility: CLINIC | Age: 27
End: 2022-11-03

## 2022-11-03 DIAGNOSIS — M25.511 CHRONIC RIGHT SHOULDER PAIN: ICD-10-CM

## 2022-11-03 DIAGNOSIS — G89.29 CHRONIC RIGHT SHOULDER PAIN: ICD-10-CM

## 2022-11-03 LAB
25(OH)D3 SERPL-MCNC: 52.2 NG/ML (ref 30–100)
ALBUMIN SERPL-MCNC: 4.6 G/DL (ref 3.5–5.2)
ALP SERPL-CCNC: 65 U/L (ref 39–117)
ALT SERPL W P-5'-P-CCNC: 16 U/L (ref 1–33)
AST SERPL-CCNC: 20 U/L (ref 1–32)
BILIRUB CONJ SERPL-MCNC: <0.2 MG/DL (ref 0–0.3)
BILIRUB INDIRECT SERPL-MCNC: NORMAL MG/DL
BILIRUB SERPL-MCNC: 0.2 MG/DL (ref 0–1.2)
PROT SERPL-MCNC: 6.9 G/DL (ref 6–8.5)

## 2022-11-03 RX ORDER — TIZANIDINE 2 MG/1
TABLET ORAL
Qty: 30 TABLET | Refills: 0 | Status: SHIPPED | OUTPATIENT
Start: 2022-11-03 | End: 2022-11-04

## 2022-11-03 NOTE — TELEPHONE ENCOUNTER
Incoming Refill Request      Medication requested (name and dose):   tiZANidine (ZANAFLEX) 2 MG tablet    Pharmacy where request should be sent: [facility]    Additional details provided by patient: RX WAS SENT IN WITHOUT INSTRUCTIONS    Best call back number: [phone]    Does the patient have less than a 3 day supply:  [x] Yes  [] No    Veronica Germain  11/03/22, 15:48 CDT

## 2022-11-04 DIAGNOSIS — G89.29 CHRONIC RIGHT SHOULDER PAIN: ICD-10-CM

## 2022-11-04 DIAGNOSIS — M25.511 CHRONIC RIGHT SHOULDER PAIN: ICD-10-CM

## 2022-11-04 RX ORDER — TIZANIDINE HYDROCHLORIDE 2 MG/1
2 CAPSULE, GELATIN COATED ORAL NIGHTLY PRN
Qty: 30 CAPSULE | Refills: 0 | Status: SHIPPED | OUTPATIENT
Start: 2022-11-04 | End: 2022-12-08 | Stop reason: SDUPTHER

## 2022-11-07 RX ORDER — TIZANIDINE HYDROCHLORIDE 2 MG/1
2 CAPSULE, GELATIN COATED ORAL NIGHTLY PRN
Qty: 30 CAPSULE | Refills: 0 | OUTPATIENT
Start: 2022-11-07

## 2022-11-30 NOTE — PROGRESS NOTES
I have seen the patient.  I have reviewed the notes, assessments, and/or procedures performed by Romana Daniel MD during office visit I concur with her/his documentation and assessment and plan for Yumikorebeca Crespotemitope John.            This document has been electronically signed by Alan Antony MD on November 30, 2022 11:06 CST

## 2022-12-08 ENCOUNTER — OFFICE VISIT (OUTPATIENT)
Dept: FAMILY MEDICINE CLINIC | Facility: CLINIC | Age: 27
End: 2022-12-08

## 2022-12-08 VITALS
DIASTOLIC BLOOD PRESSURE: 90 MMHG | OXYGEN SATURATION: 98 % | TEMPERATURE: 97.1 F | HEIGHT: 63 IN | BODY MASS INDEX: 32.96 KG/M2 | SYSTOLIC BLOOD PRESSURE: 120 MMHG | HEART RATE: 90 BPM | WEIGHT: 186 LBS

## 2022-12-08 DIAGNOSIS — F32.A DEPRESSION, UNSPECIFIED DEPRESSION TYPE: Primary | ICD-10-CM

## 2022-12-08 DIAGNOSIS — G89.29 CHRONIC RIGHT SHOULDER PAIN: ICD-10-CM

## 2022-12-08 DIAGNOSIS — M25.511 CHRONIC RIGHT SHOULDER PAIN: ICD-10-CM

## 2022-12-08 PROCEDURE — 99213 OFFICE O/P EST LOW 20 MIN: CPT | Performed by: STUDENT IN AN ORGANIZED HEALTH CARE EDUCATION/TRAINING PROGRAM

## 2022-12-08 RX ORDER — TIZANIDINE HYDROCHLORIDE 2 MG/1
2 CAPSULE, GELATIN COATED ORAL NIGHTLY PRN
Qty: 30 CAPSULE | Refills: 0 | Status: SHIPPED | OUTPATIENT
Start: 2022-12-08 | End: 2022-12-11

## 2022-12-08 NOTE — PROGRESS NOTES
Family Medicine Residency  Romana Daniel MD    Subjective:     Yumiko Lopez is a 27 y.o. female who presents for depression follow up. Patient reports that she is feeling better. Denies any SI/HI. She has not made her appointment with Sp yet. Patient is also requesting refill for Zanaflex for her right shoulder.     The following portions of the patient's history were reviewed and updated as appropriate: allergies, current medications, past family history, past medical history, past social history, past surgical history and problem list.    Past Medical Hx:  Past Medical History:   Diagnosis Date   • Anxiety    • Colitis    • Depression    • Personal history of cardiac arrhythmia    • SVT (supraventricular tachycardia) (HCC)     s/p ablation November 2015   • Thyroid dysfunction        Past Surgical Hx:  Past Surgical History:   Procedure Laterality Date   • ANKLE SURGERY  12/01/2010   • APPENDECTOMY  11/2012   • CARDIAC ABLATION  11/2015    for SVT       Current Meds:    Current Outpatient Medications:   •  famotidine (PEPCID) 20 MG tablet, TAKE 1 TABLET TWICE DAILY, Disp: 60 tablet, Rfl: 3  •  levonorgestrel-ethinyl estradiol (NORDETTE) 0.15-30 MG-MCG per tablet, Take 1 tablet by mouth Daily., Disp: 28 tablet, Rfl: 12  •  levothyroxine (SYNTHROID, LEVOTHROID) 25 MCG tablet, Take 1 tablet by mouth Daily., Disp: 90 tablet, Rfl: 1  •  ondansetron (ZOFRAN) 4 MG tablet, Take 1 tablet by mouth Every 6 (Six) Hours As Needed for Nausea or Vomiting., Disp: 30 tablet, Rfl: 2  •  sertraline (ZOLOFT) 50 MG tablet, Take 1 tablet by mouth Daily., Disp: 30 tablet, Rfl: 2  •  TiZANidine (Zanaflex) 2 MG capsule, Take 1 capsule by mouth At Night As Needed for Muscle Spasms., Disp: 30 capsule, Rfl: 0    Allergies:  Allergies   Allergen Reactions   • Tamiflu [Oseltamivir Phosphate] Rash   • Vancomycin Itching       Family Hx:  Family History   Problem Relation Age of Onset   • Depression Mother    • Hypertension  "Father    • No Known Problems Sister    • No Known Problems Brother    • No Known Problems Maternal Grandmother    • Colon cancer Maternal Grandfather    • Uterine cancer Paternal Grandmother    • Deep vein thrombosis Other         Social History:  Social History     Socioeconomic History   • Marital status:    Tobacco Use   • Smoking status: Never   • Smokeless tobacco: Never   Vaping Use   • Vaping Use: Never used   Substance and Sexual Activity   • Alcohol use: No   • Drug use: No   • Sexual activity: Yes     Partners: Male     Comment: last pap smear 9/27/18 negative        Review of Systems  Review of Systems   Constitutional: Negative for fatigue and unexpected weight change.   Respiratory: Negative for chest tightness and shortness of breath.    Cardiovascular: Negative for chest pain and palpitations.   Gastrointestinal: Negative for abdominal pain, constipation, diarrhea and vomiting.   Endocrine: Negative for cold intolerance, heat intolerance, polydipsia and polyuria.   Musculoskeletal: Negative for back pain, myalgias and neck pain.   Skin: Negative for rash.   Neurological: Negative for dizziness, weakness, light-headedness, numbness and headaches.   Psychiatric/Behavioral: Negative for behavioral problems, self-injury, sleep disturbance and suicidal ideas.       Objective:     /90   Pulse 90   Temp 97.1 °F (36.2 °C)   Ht 160 cm (63\")   Wt 84.4 kg (186 lb)   SpO2 98%   BMI 32.95 kg/m²   Physical Exam  Vitals reviewed.   Constitutional:       General: She is not in acute distress.     Appearance: Normal appearance. She is not ill-appearing.   HENT:      Right Ear: External ear normal.      Left Ear: External ear normal.   Eyes:      Conjunctiva/sclera: Conjunctivae normal.   Cardiovascular:      Rate and Rhythm: Normal rate and regular rhythm.   Pulmonary:      Effort: Pulmonary effort is normal. No respiratory distress.      Breath sounds: Normal breath sounds.   Neurological:      " Mental Status: She is alert and oriented to person, place, and time.   Psychiatric:         Thought Content: Thought content does not include homicidal or suicidal ideation.          Assessment/Plan:     Diagnoses and all orders for this visit:    1. Depression, unspecified depression type (Primary)  -     sertraline (ZOLOFT) 50 MG tablet; Take 1 tablet by mouth Daily.  Dispense: 30 tablet; Refill: 2  -     Patient doing better. PHQ-9 is 8. Denies HI/SI  -     Advised to make the appointment with [facility] for therapy  -     Refills given for 3 months    2. Chronic right shoulder pain  -     TiZANidine (Zanaflex) 2 MG capsule; Take 1 capsule by mouth At Night As Needed for Muscle Spasms.  Dispense: 30 capsule; Refill: 0  -     Exercises to help with the pain showed to patient.          Follow-up:     Return in about 3 months (around 3/8/2023).    Preventative:  Health Maintenance   Topic Date Due   • ANNUAL PHYSICAL  Never done   • COVID-19 Vaccine (3 - Booster for Moderna series) 10/26/2021   • INFLUENZA VACCINE  03/31/2023 (Originally 8/1/2022)   • PAP SMEAR  06/01/2025   • TDAP/TD VACCINES (3 - Td or Tdap) 05/05/2031   • HEPATITIS C SCREENING  Completed   • HPV VACCINES  Completed   • Pneumococcal Vaccine 0-64  Aged Out   • CHLAMYDIA SCREENING  Discontinued     Female Preventative: Patient is due for annual physical    Alcohol use:  reports no history of alcohol use.  Nicotine status  reports that she has never smoked. She has never used smokeless tobacco.     Goals     •  Reduce fat intake/eat healthier (pt-stated)       Barriers:  Likes [employer], but will try to eat it less frequently, e.g. Once every 2 weeks instead of weekly            RISK SCORE: 3    Signature        Romana Luciano MD PGY2  [facility] Family Medicine Residency  [address] [place] [address]  Office: [phone]    This document has been electronically signed by Romana Daniel MD on December 8,  2022 17:03 CST

## 2022-12-09 NOTE — PROGRESS NOTES
I have seen the patient and I have reviewed the notes, assessments, and/or procedures performed by Romana Daniel MD during office visit. I concur with her/his documentation and assessment and plan for Yumiko Lopez.           This document has been electronically signed by Won Brunson MD on December 9, 2022 11:06 CST

## 2022-12-11 PROCEDURE — 87635 SARS-COV-2 COVID-19 AMP PRB: CPT | Performed by: NURSE PRACTITIONER

## 2023-01-20 DIAGNOSIS — K21.9 GASTROESOPHAGEAL REFLUX DISEASE: ICD-10-CM

## 2023-01-20 RX ORDER — FAMOTIDINE 20 MG/1
TABLET, FILM COATED ORAL
Qty: 60 TABLET | Refills: 2 | Status: SHIPPED | OUTPATIENT
Start: 2023-01-20

## 2023-01-23 RX ORDER — TIZANIDINE 2 MG/1
2 TABLET ORAL NIGHTLY
Qty: 30 TABLET | Refills: 0 | Status: SHIPPED | OUTPATIENT
Start: 2023-01-23 | End: 2023-03-02

## 2023-02-03 ENCOUNTER — DOCUMENTATION (OUTPATIENT)
Dept: OBSTETRICS AND GYNECOLOGY | Facility: CLINIC | Age: 28
End: 2023-02-03
Payer: MEDICAID

## 2023-02-03 ENCOUNTER — LAB (OUTPATIENT)
Dept: LAB | Facility: HOSPITAL | Age: 28
End: 2023-02-03
Payer: MEDICAID

## 2023-02-03 DIAGNOSIS — N89.8 VAGINAL DISCHARGE: Primary | ICD-10-CM

## 2023-02-03 DIAGNOSIS — N89.8 VAGINAL DISCHARGE: ICD-10-CM

## 2023-02-03 LAB
CANDIDA ALBICANS: NEGATIVE
GARDNERELLA VAGINALIS: POSITIVE
T VAGINALIS DNA VAG QL PROBE+SIG AMP: NEGATIVE

## 2023-02-03 PROCEDURE — 87480 CANDIDA DNA DIR PROBE: CPT

## 2023-02-03 PROCEDURE — 87510 GARDNER VAG DNA DIR PROBE: CPT

## 2023-02-03 PROCEDURE — 87660 TRICHOMONAS VAGIN DIR PROBE: CPT

## 2023-02-03 RX ORDER — METRONIDAZOLE 500 MG/1
500 TABLET ORAL 2 TIMES DAILY
Qty: 14 TABLET | Refills: 0 | Status: SHIPPED | OUTPATIENT
Start: 2023-02-03 | End: 2023-02-10

## 2023-03-02 RX ORDER — TIZANIDINE 2 MG/1
2 TABLET ORAL NIGHTLY
Qty: 30 TABLET | Refills: 0 | Status: SHIPPED | OUTPATIENT
Start: 2023-03-02 | End: 2023-04-04 | Stop reason: SDUPTHER

## 2023-03-07 ENCOUNTER — OFFICE VISIT (OUTPATIENT)
Dept: FAMILY MEDICINE CLINIC | Facility: CLINIC | Age: 28
End: 2023-03-07
Payer: MEDICAID

## 2023-03-07 VITALS
DIASTOLIC BLOOD PRESSURE: 78 MMHG | WEIGHT: 184.9 LBS | OXYGEN SATURATION: 98 % | HEIGHT: 63 IN | SYSTOLIC BLOOD PRESSURE: 120 MMHG | HEART RATE: 87 BPM | BODY MASS INDEX: 32.76 KG/M2

## 2023-03-07 DIAGNOSIS — E03.9 HYPOTHYROIDISM, UNSPECIFIED TYPE: ICD-10-CM

## 2023-03-07 DIAGNOSIS — F32.A DEPRESSION, UNSPECIFIED DEPRESSION TYPE: Primary | ICD-10-CM

## 2023-03-07 PROCEDURE — 99213 OFFICE O/P EST LOW 20 MIN: CPT | Performed by: STUDENT IN AN ORGANIZED HEALTH CARE EDUCATION/TRAINING PROGRAM

## 2023-03-07 NOTE — PROGRESS NOTES
Family Medicine Residency  Romana Daniel MD    Subjective:     Yumiko Lopez is a 27 y.o. female who presents for depression follow-up.  Patient is currently on Zoloft 50 mg.  Patient is tolerating well.  She states that she is doing great.  No concerns today.    The following portions of the patient's history were reviewed and updated as appropriate: allergies, current medications, past family history, past medical history, past social history, past surgical history and problem list.    Past Medical Hx:  Past Medical History:   Diagnosis Date   • Anxiety    • Colitis    • Depression    • Personal history of cardiac arrhythmia    • SVT (supraventricular tachycardia) (HCC)     s/p ablation November 2015   • Thyroid dysfunction        Past Surgical Hx:  Past Surgical History:   Procedure Laterality Date   • ANKLE SURGERY  12/01/2010   • APPENDECTOMY  11/2012   • CARDIAC ABLATION  11/2015    for SVT       Current Meds:    Current Outpatient Medications:   •  famotidine (PEPCID) 20 MG tablet, TAKE 1 TABLET TWICE DAILY, Disp: 60 tablet, Rfl: 2  •  levothyroxine (SYNTHROID, LEVOTHROID) 25 MCG tablet, Take 1 tablet by mouth Daily., Disp: 90 tablet, Rfl: 1  •  ondansetron (ZOFRAN) 4 MG tablet, Take 1 tablet by mouth Every 6 (Six) Hours As Needed for Nausea or Vomiting. Indications: Nausea and Vomiting, Disp: 30 tablet, Rfl: 0  •  sertraline (ZOLOFT) 50 MG tablet, Take 1 tablet by mouth Daily., Disp: 90 tablet, Rfl: 0  •  tiZANidine (ZANAFLEX) 2 MG tablet, TAKE 1 TABLET BY MOUTH EVERY NIGHT, Disp: 30 tablet, Rfl: 0  •  levonorgestrel-ethinyl estradiol (NORDETTE) 0.15-30 MG-MCG per tablet, Take 1 tablet by mouth Daily., Disp: 28 tablet, Rfl: 12    Allergies:  Allergies   Allergen Reactions   • Tamiflu [Oseltamivir Phosphate] Rash   • Vancomycin Itching       Family Hx:  Family History   Problem Relation Age of Onset   • Depression Mother    • Hypertension Father    • No Known Problems Sister    • No Known Problems  "Brother    • No Known Problems Maternal Grandmother    • Colon cancer Maternal Grandfather    • Uterine cancer Paternal Grandmother    • Deep vein thrombosis Other         Social History:  Social History     Socioeconomic History   • Marital status:    Tobacco Use   • Smoking status: Never   • Smokeless tobacco: Never   Vaping Use   • Vaping Use: Never used   Substance and Sexual Activity   • Alcohol use: No   • Drug use: No   • Sexual activity: Yes     Partners: Male     Comment: last pap smear 9/27/18 negative        Review of Systems  Review of Systems   Constitutional: Negative for fatigue and unexpected weight change.   Respiratory: Negative for chest tightness and shortness of breath.    Cardiovascular: Negative for chest pain and palpitations.   Gastrointestinal: Negative for abdominal pain, constipation, diarrhea and vomiting.   Endocrine: Negative for cold intolerance, heat intolerance, polydipsia and polyuria.   Musculoskeletal: Positive for arthralgias. Negative for back pain, myalgias and neck pain.   Skin: Negative for rash.   Neurological: Negative for dizziness, weakness, light-headedness, numbness and headaches.   Psychiatric/Behavioral: Negative for sleep disturbance. The patient is not nervous/anxious.        Objective:     /78   Pulse 87   Ht 160 cm (63\")   Wt 83.9 kg (184 lb 14.4 oz)   SpO2 98%   BMI 32.75 kg/m²   Physical Exam  Vitals reviewed.   Constitutional:       General: She is not in acute distress.     Appearance: She is not ill-appearing.   Eyes:      Conjunctiva/sclera: Conjunctivae normal.   Cardiovascular:      Rate and Rhythm: Normal rate and regular rhythm.   Pulmonary:      Effort: Pulmonary effort is normal. No respiratory distress.   Neurological:      Mental Status: She is alert and oriented to person, place, and time.   Psychiatric:         Behavior: Behavior normal.          Assessment/Plan:     Diagnoses and all orders for this visit:    1. Depression, " unspecified depression type (Primary)  -     sertraline (ZOLOFT) 50 MG tablet; Take 1 tablet by mouth Daily.  Dispense: 90 tablet; Refill: 0  Patient doing well on Zoloft 50 mg.  PHQ-9 today is 1.  Continue current regimen.  Return in 3 months for follow-up.    2. Hypothyroidism, unspecified type  -     TSH+Free T4; Future  She is currently taking levothyroxine 25 mcg.  We will place order to check her TSH.  Patient advised to get labs done 2 days prior to her annual physical.      Follow-up:     Return in about 7 weeks (around 4/28/2023) for Annual.    Preventative:  Health Maintenance   Topic Date Due   • COVID-19 Vaccine (3 - Booster for Moderna series) 03/26/2023 (Originally 10/26/2021)   • INFLUENZA VACCINE  03/31/2023 (Originally 8/1/2022)   • ANNUAL PHYSICAL  04/28/2023 (Originally 9/27/2017)   • PAP SMEAR  06/01/2025   • TDAP/TD VACCINES (3 - Td or Tdap) 05/05/2031   • HEPATITIS C SCREENING  Completed   • HPV VACCINES  Completed   • Pneumococcal Vaccine 0-64  Aged Out   • CHLAMYDIA SCREENING  Discontinued     Female Preventative: Patient is due for annual physical exam.  She will schedule appointment today     Alcohol use:  reports no history of alcohol use.  Nicotine status  reports that she has never smoked. She has never used smokeless tobacco.     Goals     •  Reduce fat intake/eat healthier (pt-stated)       Barriers:  Likes [employer], but will try to eat it less frequently, e.g. Once every 2 weeks instead of weekly            RISK SCORE: 3    Signature        Romana Luciano MD PGY2  [facility] Family Medicine Residency  [address] [place] [address]  Office: [phone]    This document has been electronically signed by Romana Daniel MD on March 7, 2023 11:00 CST

## 2023-03-08 NOTE — PROGRESS NOTES
I have seen the patient and I have reviewed the notes, assessments, and/or procedures performed by Romana Daniel MD during office visit. I concur with her/his documentation and assessment and plan for Yumiko Lopez.           This document has been electronically signed by Won Brunson MD on March 8, 2023 16:08 CST

## 2023-04-04 RX ORDER — TIZANIDINE 2 MG/1
2 TABLET ORAL NIGHTLY
Qty: 30 TABLET | Refills: 0 | Status: SHIPPED | OUTPATIENT
Start: 2023-04-04

## 2023-04-18 DIAGNOSIS — K21.9 GASTROESOPHAGEAL REFLUX DISEASE: ICD-10-CM

## 2023-04-19 RX ORDER — FAMOTIDINE 20 MG/1
TABLET, FILM COATED ORAL
Qty: 60 TABLET | Refills: 1 | Status: SHIPPED | OUTPATIENT
Start: 2023-04-19

## 2023-05-09 RX ORDER — TIZANIDINE 2 MG/1
2 TABLET ORAL NIGHTLY
Qty: 30 TABLET | Refills: 0 | OUTPATIENT
Start: 2023-05-09

## 2023-05-09 RX ORDER — TIZANIDINE HYDROCHLORIDE 4 MG/1
4 CAPSULE, GELATIN COATED ORAL NIGHTLY PRN
Qty: 15 CAPSULE | Refills: 0 | Status: SHIPPED | OUTPATIENT
Start: 2023-05-09

## 2023-05-30 DIAGNOSIS — E03.9 HYPOTHYROIDISM, UNSPECIFIED TYPE: ICD-10-CM

## 2023-05-30 RX ORDER — LEVOTHYROXINE SODIUM 0.03 MG/1
TABLET ORAL
Qty: 90 TABLET | Refills: 0 | Status: SHIPPED | OUTPATIENT
Start: 2023-05-30

## 2023-06-14 ENCOUNTER — LAB (OUTPATIENT)
Dept: LAB | Facility: HOSPITAL | Age: 28
End: 2023-06-14
Payer: MEDICAID

## 2023-06-14 ENCOUNTER — OFFICE VISIT (OUTPATIENT)
Dept: FAMILY MEDICINE CLINIC | Facility: CLINIC | Age: 28
End: 2023-06-14
Payer: MEDICAID

## 2023-06-14 VITALS
HEART RATE: 63 BPM | HEIGHT: 63 IN | BODY MASS INDEX: 34.43 KG/M2 | TEMPERATURE: 95.9 F | SYSTOLIC BLOOD PRESSURE: 120 MMHG | WEIGHT: 194.3 LBS | OXYGEN SATURATION: 99 % | DIASTOLIC BLOOD PRESSURE: 80 MMHG

## 2023-06-14 DIAGNOSIS — F32.A DEPRESSION, UNSPECIFIED DEPRESSION TYPE: ICD-10-CM

## 2023-06-14 DIAGNOSIS — Z11.59 NEED FOR HEPATITIS C SCREENING TEST: ICD-10-CM

## 2023-06-14 DIAGNOSIS — Z00.00 ANNUAL PHYSICAL EXAM: ICD-10-CM

## 2023-06-14 DIAGNOSIS — Z00.00 ANNUAL PHYSICAL EXAM: Primary | ICD-10-CM

## 2023-06-14 DIAGNOSIS — K21.9 GASTROESOPHAGEAL REFLUX DISEASE: ICD-10-CM

## 2023-06-14 DIAGNOSIS — E03.9 HYPOTHYROIDISM, UNSPECIFIED TYPE: ICD-10-CM

## 2023-06-14 LAB
HCV AB SER DONR QL: NORMAL
T4 FREE SERPL-MCNC: 1.15 NG/DL (ref 0.93–1.7)
TSH SERPL DL<=0.05 MIU/L-ACNC: 1.65 UIU/ML (ref 0.27–4.2)

## 2023-06-14 PROCEDURE — 84439 ASSAY OF FREE THYROXINE: CPT

## 2023-06-14 PROCEDURE — 87661 TRICHOMONAS VAGINALIS AMPLIF: CPT

## 2023-06-14 PROCEDURE — 36415 COLL VENOUS BLD VENIPUNCTURE: CPT

## 2023-06-14 PROCEDURE — 87491 CHLMYD TRACH DNA AMP PROBE: CPT

## 2023-06-14 PROCEDURE — 86803 HEPATITIS C AB TEST: CPT

## 2023-06-14 PROCEDURE — 84443 ASSAY THYROID STIM HORMONE: CPT

## 2023-06-14 PROCEDURE — 87591 N.GONORRHOEAE DNA AMP PROB: CPT

## 2023-06-14 RX ORDER — ONDANSETRON 4 MG/1
4 TABLET, FILM COATED ORAL EVERY 6 HOURS PRN
Qty: 30 TABLET | Refills: 0 | Status: CANCELLED | OUTPATIENT
Start: 2023-06-14

## 2023-06-14 RX ORDER — TIZANIDINE 2 MG/1
2 TABLET ORAL NIGHTLY
Qty: 30 TABLET | Refills: 0 | Status: CANCELLED | OUTPATIENT
Start: 2023-06-14

## 2023-06-14 RX ORDER — FAMOTIDINE 20 MG/1
20 TABLET, FILM COATED ORAL 2 TIMES DAILY
Qty: 60 TABLET | Refills: 1 | Status: SHIPPED | OUTPATIENT
Start: 2023-06-14

## 2023-06-14 RX ORDER — TIZANIDINE HYDROCHLORIDE 4 MG/1
4 CAPSULE, GELATIN COATED ORAL NIGHTLY PRN
Qty: 15 CAPSULE | Refills: 0 | Status: SHIPPED | OUTPATIENT
Start: 2023-06-14

## 2023-06-14 RX ORDER — LEVOTHYROXINE SODIUM 0.03 MG/1
25 TABLET ORAL DAILY
Qty: 90 TABLET | Refills: 0 | Status: SHIPPED | OUTPATIENT
Start: 2023-06-14

## 2023-06-14 RX ORDER — LEVONORGESTREL AND ETHINYL ESTRADIOL 0.15-0.03
1 KIT ORAL DAILY
Qty: 28 TABLET | Refills: 12 | Status: CANCELLED | OUTPATIENT
Start: 2023-06-14 | End: 2024-06-13

## 2023-06-15 ENCOUNTER — TELEPHONE (OUTPATIENT)
Dept: FAMILY MEDICINE CLINIC | Facility: CLINIC | Age: 28
End: 2023-06-15
Payer: MEDICAID

## 2023-06-15 LAB
C TRACH RRNA CVX QL NAA+PROBE: NEGATIVE
N GONORRHOEA RRNA SPEC QL NAA+PROBE: NEGATIVE
TRICHOMONAS VAGINALIS PCR: NEGATIVE

## 2023-06-15 NOTE — PROGRESS NOTES
Family Medicine Residency  Noemi Rivera MD    Subjective:     Yumiko Lopez is a 28 y.o. female who presents for an annual visit.     Patient with previous diagnosis of hypothyroidism, depression and heart burn. Currently taking levothyroxine 25mg, zoloft 50mg and pepcid 20mg. Stable on this regimen. No other acute concerns today.     She is sexually active and would like to be tested for BV, STD and hep C today.     Last pap was 2022.     The following portions of the patient's history were reviewed and updated as appropriate: allergies, current medications, past family history, past medical history, past social history, past surgical history, and problem list.    Past Medical Hx:  Past Medical History:   Diagnosis Date    Anxiety     Colitis     Depression     Personal history of cardiac arrhythmia     SVT (supraventricular tachycardia)     s/p ablation November 2015    Thyroid dysfunction        Past Surgical Hx:  Past Surgical History:   Procedure Laterality Date    ANKLE SURGERY  12/01/2010    APPENDECTOMY  11/2012    CARDIAC ABLATION  11/2015    for SVT       Current Meds:    Current Outpatient Medications:     famotidine (PEPCID) 20 MG tablet, Take 1 tablet by mouth 2 (Two) Times a Day., Disp: 60 tablet, Rfl: 1    levothyroxine (SYNTHROID, LEVOTHROID) 25 MCG tablet, Take 1 tablet by mouth Daily., Disp: 90 tablet, Rfl: 0    ondansetron (ZOFRAN) 4 MG tablet, Take 1 tablet by mouth Every 6 (Six) Hours As Needed for Nausea or Vomiting. Indications: Nausea and Vomiting, Disp: 30 tablet, Rfl: 0    sertraline (ZOLOFT) 50 MG tablet, Take 1 tablet by mouth Daily., Disp: 90 tablet, Rfl: 0    TiZANidine (Zanaflex) 4 MG capsule, Take 1 capsule by mouth At Night As Needed for Muscle Spasms., Disp: 15 capsule, Rfl: 0    Allergies:  Allergies   Allergen Reactions    Tamiflu [Oseltamivir Phosphate] Rash    Vancomycin Itching       Family Hx:  Family History   Problem Relation Age of Onset    Depression Mother      "Hypertension Father     No Known Problems Sister     No Known Problems Brother     No Known Problems Maternal Grandmother     Colon cancer Maternal Grandfather     Uterine cancer Paternal Grandmother     Deep vein thrombosis Other         Social History:  Social History     Socioeconomic History    Marital status:    Tobacco Use    Smoking status: Never    Smokeless tobacco: Never   Vaping Use    Vaping Use: Never used   Substance and Sexual Activity    Alcohol use: No    Drug use: No    Sexual activity: Yes     Partners: Male     Comment: last pap smear 9/27/18 negative        Review of Systems  Review of Systems   Constitutional: Negative.    Respiratory: Negative.     Cardiovascular: Negative.    Gastrointestinal: Negative.    Endocrine: Negative.    Musculoskeletal:  Negative for arthralgias and myalgias.   Skin: Negative.    Neurological: Negative.  Negative for headaches.   Psychiatric/Behavioral: Negative.  Negative for suicidal ideas.      Objective:     /80   Pulse 63   Temp 95.9 °F (35.5 °C)   Ht 160 cm (63\")   Wt 88.1 kg (194 lb 4.8 oz)   SpO2 99%   BMI 34.42 kg/m²   Physical Exam  Vitals reviewed.   Constitutional:       Appearance: Normal appearance.   HENT:      Head: Normocephalic and atraumatic.      Nose: Nose normal.      Mouth/Throat:      Mouth: Mucous membranes are moist.   Cardiovascular:      Rate and Rhythm: Normal rate and regular rhythm.      Pulses: Normal pulses.      Heart sounds: Normal heart sounds.   Pulmonary:      Effort: Pulmonary effort is normal. No respiratory distress.      Breath sounds: Normal breath sounds.   Abdominal:      General: Abdomen is flat. Bowel sounds are normal. There is no distension.      Palpations: Abdomen is soft.      Tenderness: There is no abdominal tenderness.   Musculoskeletal:         General: Normal range of motion.      Cervical back: Normal range of motion.      Right lower leg: No edema.      Left lower leg: No edema.   Skin:    "  General: Skin is warm and dry.      Capillary Refill: Capillary refill takes less than 2 seconds.   Neurological:      General: No focal deficit present.      Mental Status: She is alert and oriented to person, place, and time.   Psychiatric:         Mood and Affect: Mood normal.         Behavior: Behavior normal.        Assessment/Plan:     Diagnoses and all orders for this visit:    1. Annual physical exam (Primary)  -     Chlamydia trachomatis, Neisseria gonorrhoeae, Trichomonas vaginalis, PCR - Urine, Vagina; Future  -     Gardnerella vaginalis, Trichomonas vaginalis, Candida albicans, DNA - Swab, Vagina; Future    2. Gastroesophageal reflux disease  -     famotidine (PEPCID) 20 MG tablet; Take 1 tablet by mouth 2 (Two) Times a Day.  Dispense: 60 tablet; Refill: 1    3. Hypothyroidism, unspecified type  -     levothyroxine (SYNTHROID, LEVOTHROID) 25 MCG tablet; Take 1 tablet by mouth Daily.  Dispense: 90 tablet; Refill: 0    4. Depression, unspecified depression type  -     sertraline (ZOLOFT) 50 MG tablet; Take 1 tablet by mouth Daily.  Dispense: 90 tablet; Refill: 0    5. Need for hepatitis C screening test  -     Hepatitis C antibody; Future    Other orders  -     TiZANidine (Zanaflex) 4 MG capsule; Take 1 capsule by mouth At Night As Needed for Muscle Spasms.  Dispense: 15 capsule; Refill: 0      Annual   Cervical cancer screening via pap in 2022 - reviewed and was normal.   Sexually active - counseled on safe sex habits; would like to be screened for STD and BV - orders placed   Hep C Screening ordered   Counseled on DASH diet; exercise to improve BMI   Recommended to receive third shot for covid series from pharmacy     2/3 : GERD/Hypothyroid   - refilled levothyroxine and pepcid   - Repeat TSH+ T4 today; adjust levothyroxine based on lab results   - counseled on taking levothyroxine on empty stomach in AM          Rx changes: see a/p  Patient Education: see a/p  Compliance at present is estimated to be  good.   Efforts to improve compliance (if necessary) will be directed at increased exercise.       Follow-up:     Return in about 1 year (around 6/14/2024) for Annual.    Preventative:  Health Maintenance   Topic Date Due    COVID-19 Vaccine (3 - Moderna series) 07/25/2023 (Originally 10/26/2021)    INFLUENZA VACCINE  10/01/2023    ANNUAL PHYSICAL  06/14/2024    PAP SMEAR  06/01/2025    TDAP/TD VACCINES (3 - Td or Tdap) 05/05/2031    HEPATITIS C SCREENING  Completed    Pneumococcal Vaccine 0-64  Aged Out    CHLAMYDIA SCREENING  Discontinued     Female Preventative: Exercises regularly  Does monthly breast self examination  Recommended:  Covid - third shot       Alcohol use:  reports no history of alcohol use.  Nicotine status  reports that she has never smoked. She has never used smokeless tobacco.     Goals         Reduce fat intake/eat healthier (pt-stated)       Barriers:  Likes [employer], but will try to eat it less frequently, e.g. Once every 2 weeks instead of weekly              RISK SCORE: 3           Noemi Rivera M.D. PGY 2  [facility]  [address] [place] [address]  Office: [phone]  This document has been electronically signed by Noemi Rivera MD on Catrachita 15, 2023 12:59 CDT

## 2023-08-09 ENCOUNTER — LAB (OUTPATIENT)
Dept: LAB | Facility: HOSPITAL | Age: 28
End: 2023-08-09
Payer: MEDICAID

## 2023-08-09 ENCOUNTER — OFFICE VISIT (OUTPATIENT)
Dept: FAMILY MEDICINE CLINIC | Facility: CLINIC | Age: 28
End: 2023-08-09
Payer: MEDICAID

## 2023-08-09 VITALS
HEART RATE: 90 BPM | HEIGHT: 63 IN | WEIGHT: 194.3 LBS | DIASTOLIC BLOOD PRESSURE: 76 MMHG | SYSTOLIC BLOOD PRESSURE: 126 MMHG | TEMPERATURE: 97.2 F | OXYGEN SATURATION: 99 % | BODY MASS INDEX: 34.43 KG/M2

## 2023-08-09 DIAGNOSIS — S20.461A TICK BITE OF RIGHT BACK WALL OF THORAX, INITIAL ENCOUNTER: ICD-10-CM
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
## 2023-08-09 DIAGNOSIS — R21 RASH: ICD-10-CM

## 2023-08-09 DIAGNOSIS — R21 RASH: Primary | ICD-10-CM

## 2023-08-09 PROCEDURE — 86618 LYME DISEASE ANTIBODY: CPT

## 2023-08-09 PROCEDURE — 36415 COLL VENOUS BLD VENIPUNCTURE: CPT

## 2023-08-09 PROCEDURE — 87484 EHRLICHA CHAFFEENSIS AMP PRB: CPT

## 2023-08-09 PROCEDURE — 87798 DETECT AGENT NOS DNA AMP: CPT

## 2023-08-09 PROCEDURE — 87468 ANAPLSMA PHGCYTOPHLM AMP PRB: CPT

## 2023-08-09 NOTE — PROGRESS NOTES
Subjective:     Yumiko Lopez is a 28 y.o. female who presents for tick bite with subsequent development of rash.  She reports that about 1 June her daughter developed hand-foot-and-mouth disease.  The patient got a new tattoo on about July 13.  By July 16 she had developed a slight rash around the area of the tattoo and also on her hands.  She at the time thought that she may have developed hand-foot-and-mouth disease.  She saw a doctor in urgent treatment center who prescribed her Keflex 500 mg every 8 for 10 days for folliculitis.  At about the same time in mid July she noticed she had a tick on the lower right side of her back.  She did not know how long it was there but she removed it and describes it as being a very small tick.  She comes in today because yesterday she ate some pork as well as some macaroni and cheese and she noticed itching and redness got worse in her hands, they swelled up, she had some diarrhea and nausea.  She did not vomit.  She is worried that she might have developed alpha gal.    The following portions of the patient's history were reviewed and updated as appropriate: allergies, current medications, past family history, past medical history, past social history, past surgical history, and problem list.    Past Medical Hx:  Past Medical History:   Diagnosis Date    Anxiety     Colitis     Depression     Personal history of cardiac arrhythmia     SVT (supraventricular tachycardia)     s/p ablation November 2015    Thyroid dysfunction        Past Surgical Hx:  Past Surgical History:   Procedure Laterality Date    ANKLE SURGERY  12/01/2010    APPENDECTOMY  11/2012    CARDIAC ABLATION  11/2015    for SVT       Current Meds:    Current Outpatient Medications:     famotidine (PEPCID) 20 MG tablet, Take 1 tablet by mouth 2 (Two) Times a Day., Disp: 60 tablet, Rfl: 1    levothyroxine (SYNTHROID, LEVOTHROID) 25 MCG tablet, Take 1 tablet by mouth Daily., Disp: 90 tablet, Rfl: 0     "ondansetron ODT (ZOFRAN-ODT) 8 MG disintegrating tablet, Place 1 tablet on the tongue Every 8 (Eight) Hours As Needed for Nausea or Vomiting., Disp: 20 tablet, Rfl: 0    sertraline (ZOLOFT) 50 MG tablet, Take 1 tablet by mouth Daily., Disp: 90 tablet, Rfl: 0    tiZANidine (ZANAFLEX) 4 MG tablet, , Disp: , Rfl:     Allergies:  Allergies   Allergen Reactions    Tamiflu [Oseltamivir Phosphate] Rash    Vancomycin Itching       Family Hx:  Family History   Problem Relation Age of Onset    Depression Mother     Hypertension Father     No Known Problems Sister     No Known Problems Brother     No Known Problems Maternal Grandmother     Colon cancer Maternal Grandfather     Uterine cancer Paternal Grandmother     Deep vein thrombosis Other         Social History:  Social History     Socioeconomic History    Marital status:    Tobacco Use    Smoking status: Never    Smokeless tobacco: Never   Vaping Use    Vaping Use: Never used   Substance and Sexual Activity    Alcohol use: No    Drug use: No    Sexual activity: Yes     Partners: Male     Comment: last pap smear 9/27/18 negative        Review of Systems  Review of Systems   Constitutional:  Negative for fatigue.   HENT:  Negative for congestion, rhinorrhea and trouble swallowing.    Eyes:  Negative for visual disturbance.   Respiratory:  Negative for cough and shortness of breath.    Cardiovascular:  Negative for chest pain and palpitations.   Gastrointestinal:  Positive for diarrhea and nausea. Negative for abdominal pain and blood in stool.   Genitourinary:  Negative for dysuria and hematuria.   Musculoskeletal:  Negative for gait problem.   Skin:  Positive for rash. Negative for color change.   Neurological:  Negative for headaches.   Psychiatric/Behavioral:  Negative for sleep disturbance.      Objective:     /76   Pulse 90   Temp 97.2 øF (36.2 øC)   Ht 160 cm (63\")   Wt 88.1 kg (194 lb 4.8 oz)   SpO2 99%   BMI 34.42 kg/mý   Physical " Exam  Constitutional:       General: She is not in acute distress.     Appearance: She is not diaphoretic.   HENT:      Head: Atraumatic.      Right Ear: External ear normal.      Left Ear: External ear normal.      Nose: No rhinorrhea.      Mouth/Throat:      Mouth: Mucous membranes are moist.      Pharynx: Oropharynx is clear.   Eyes:      General: No scleral icterus.        Right eye: No discharge.         Left eye: No discharge.   Neck:      Vascular: No carotid bruit.   Cardiovascular:      Rate and Rhythm: Normal rate and regular rhythm.      Pulses: Normal pulses.      Heart sounds: No murmur heard.  Pulmonary:      Effort: No respiratory distress.      Breath sounds: No wheezing.   Abdominal:      General: Bowel sounds are normal.      Palpations: There is no mass.      Tenderness: There is no abdominal tenderness.   Musculoskeletal:      Right lower leg: No edema.      Left lower leg: No edema.   Skin:     Capillary Refill: Capillary refill takes less than 2 seconds.      Findings: Rash present.      Comments: 3 or 4 small punctate lesions around the area of new tattoo that appears to be resolving.  Numerous small punctate region on the dorsum and palmar aspect of the hands.  Hand skin is dry.  Mildly erythematous palms.   Neurological:      Mental Status: She is alert. Mental status is at baseline.   Psychiatric:         Mood and Affect: Mood normal.         Thought Content: Thought content normal.      Assessment/Plan:     Diagnoses and all orders for this visit:    1. Rash (Primary)  Small punctate lesions right anterior thigh above recent tattoo.  Several similar small punctate lesions dorsal aspect of both hands.  Mild erythema palmar aspect of hands.  No dryness of the hands.  Patient endorses that all of this came on about the time she got the new tattoo on the thigh in mid July and about the same time she found a tick on her right lower back and removed it.  She is unsure how long the tick was on  her before she removed it but describes it as a very small tick.  Just prior to the onset of these rash like lesions her daughter had been diagnosed with hand-foot-and-mouth disease.  Patient was treated in urgent treatment center for folliculitis mid July with 10 days of Keflex 500.  She finished that prescription.  Unfortunately, yesterday she reports that she ate some pulled pork and some mac & cheese and developed some rather quick onset of nausea, diarrhea, swelling of the hands, and increased itching and erythema of the palms.  Discussed differentials of folliculitis from the tattoo, hand-foot-and-mouth disease, or reaction to a tickborne illness.  Will run tick panel is our first diagnostic step.  I will call her with the results come in.  If further testing is needed we may do alpha-gal testing.  She was advised to follow-up with us before 2 weeks if she developed increasing redness or rash or recurrence of the nausea and diarrhea.  She is good with this plan.  -     Tick Panel; Future    2. Tick bite of right back wall of thorax, initial encounter  -     Tick Panel; Future       Follow-up:     Return in about 2 weeks (around 8/23/2023), or With PCP Dr. Daniel.        Alcohol use:  reports no history of alcohol use.  Nicotine status  reports that she has never smoked. She has never used smokeless tobacco.     Goals         Reduce fat intake/eat healthier (pt-stated)       Barriers:  Likes [employer], but will try to eat it less frequently, e.g. Once every 2 weeks instead of weekly                  This document has been electronically signed by Jeffery Chang MD on August 9, 2023 11:59 CDT

## 2023-08-10 LAB — B BURGDOR IGG+IGM SER QL IA: NEGATIVE

## 2023-08-11 ENCOUNTER — TELEPHONE (OUTPATIENT)
Dept: FAMILY MEDICINE CLINIC | Facility: CLINIC | Age: 28
End: 2023-08-11
Payer: MEDICAID

## 2023-08-11 NOTE — TELEPHONE ENCOUNTER
Called patient to inform her Lyme disease test came back negative.  Advised her I was still waiting on the results for the Ehrlichia and Rickettsia.  Someone from here I will call her when those come in.    This document has been electronically signed by Jeffery Chang MD on August 11, 2023 12:56 CDT

## 2023-08-15 LAB
A PHAGOCYTOPH DNA BLD QL NAA+PROBE: NEGATIVE
E CHAFFEENSIS DNA BLD QL NAA+PROBE: NEGATIVE

## 2023-08-16 DIAGNOSIS — K21.9 GASTROESOPHAGEAL REFLUX DISEASE: ICD-10-CM

## 2023-08-16 LAB — RICKETTSIA RICKETTSII DNA, RT: NOT DETECTED

## 2023-08-16 RX ORDER — FAMOTIDINE 20 MG/1
20 TABLET, FILM COATED ORAL 2 TIMES DAILY
Qty: 60 TABLET | Refills: 1 | Status: SHIPPED | OUTPATIENT
Start: 2023-08-16

## 2023-08-24 ENCOUNTER — OFFICE VISIT (OUTPATIENT)
Dept: FAMILY MEDICINE CLINIC | Facility: CLINIC | Age: 28
End: 2023-08-24
Payer: MEDICAID

## 2023-08-24 VITALS
OXYGEN SATURATION: 99 % | BODY MASS INDEX: 34.73 KG/M2 | DIASTOLIC BLOOD PRESSURE: 86 MMHG | HEIGHT: 63 IN | WEIGHT: 196 LBS | HEART RATE: 81 BPM | SYSTOLIC BLOOD PRESSURE: 132 MMHG

## 2023-08-24 DIAGNOSIS — G89.29 CHRONIC RIGHT SHOULDER PAIN: ICD-10-CM

## 2023-08-24 DIAGNOSIS — M25.511 CHRONIC RIGHT SHOULDER PAIN: ICD-10-CM

## 2023-08-24 DIAGNOSIS — R21 RASH OF BOTH HANDS: Primary | ICD-10-CM

## 2023-08-24 PROCEDURE — 99213 OFFICE O/P EST LOW 20 MIN: CPT | Performed by: STUDENT IN AN ORGANIZED HEALTH CARE EDUCATION/TRAINING PROGRAM

## 2023-08-24 RX ORDER — DIAPER,BRIEF,INFANT-TODD,DISP
1 EACH MISCELLANEOUS 2 TIMES DAILY
Qty: 110 G | Refills: 0 | Status: SHIPPED | OUTPATIENT
Start: 2023-08-24

## 2023-08-24 RX ORDER — TIZANIDINE 4 MG/1
4 TABLET ORAL NIGHTLY PRN
Qty: 30 TABLET | Refills: 0 | Status: SHIPPED | OUTPATIENT
Start: 2023-08-24

## 2023-08-24 NOTE — PROGRESS NOTES
I have reviewed the notes, assessments, and/or procedures performed by Dr Daniel, I concur with her/his documentation of Yumiko Lopez.       This document has been electronically signed by Rafa Pack MD on August 24, 2023 10:45 CDT

## 2023-08-24 NOTE — PROGRESS NOTES
Family Medicine Residency  Romana Daniel MD    Subjective:     Yumiko Lopez is a 28 y.o. female who presents for rash follow up. Patient reports that she is still experiencing rash in her hands. She reports the rash is accompanied by itchy throat. She reports that rash is mainly localized in the hands, it is itchy. She admits to GI symptoms like nausea. Denies vomiting or diarrhea. She has not keep a food diary. Denies any creams, soaps, detergents. No other concerns today.     The following portions of the patient's history were reviewed and updated as appropriate: allergies, current medications, past family history, past medical history, past social history, past surgical history, and problem list.    Past Medical Hx:  Past Medical History:   Diagnosis Date    Anxiety     Colitis     Depression     Personal history of cardiac arrhythmia     SVT (supraventricular tachycardia)     s/p ablation November 2015    Thyroid dysfunction        Past Surgical Hx:  Past Surgical History:   Procedure Laterality Date    ANKLE SURGERY  12/01/2010    APPENDECTOMY  11/2012    CARDIAC ABLATION  11/2015    for SVT       Current Meds:    Current Outpatient Medications:     famotidine (PEPCID) 20 MG tablet, Take 1 tablet by mouth 2 (Two) Times a Day., Disp: 60 tablet, Rfl: 1    levothyroxine (SYNTHROID, LEVOTHROID) 25 MCG tablet, Take 1 tablet by mouth Daily., Disp: 90 tablet, Rfl: 0    ondansetron ODT (ZOFRAN-ODT) 8 MG disintegrating tablet, Place 1 tablet on the tongue Every 8 (Eight) Hours As Needed for Nausea or Vomiting., Disp: 20 tablet, Rfl: 0    sertraline (ZOLOFT) 50 MG tablet, Take 1 tablet by mouth Daily., Disp: 90 tablet, Rfl: 0    tiZANidine (ZANAFLEX) 4 MG tablet, Take 1 tablet by mouth At Night As Needed for Muscle Spasms., Disp: 30 tablet, Rfl: 0    hydrocortisone 1 % ointment, Apply 1 application  topically to the appropriate area as directed 2 (Two) Times a Day., Disp: 110 g, Rfl:  "0    Allergies:  Allergies   Allergen Reactions    Tamiflu [Oseltamivir Phosphate] Rash    Vancomycin Itching       Family Hx:  Family History   Problem Relation Age of Onset    Depression Mother     Hypertension Father     No Known Problems Sister     No Known Problems Brother     No Known Problems Maternal Grandmother     Colon cancer Maternal Grandfather     Uterine cancer Paternal Grandmother     Deep vein thrombosis Other         Social History:  Social History     Socioeconomic History    Marital status:    Tobacco Use    Smoking status: Never    Smokeless tobacco: Never   Vaping Use    Vaping Use: Never used   Substance and Sexual Activity    Alcohol use: No    Drug use: No    Sexual activity: Yes     Partners: Male     Comment: last pap smear 9/27/18 negative        Review of Systems  Review of Systems   Constitutional:  Negative for chills and fever.   Respiratory:  Negative for shortness of breath and wheezing.    Cardiovascular:  Negative for chest pain and palpitations.   Gastrointestinal:  Positive for nausea. Negative for abdominal pain, diarrhea and vomiting.   Musculoskeletal:  Negative for arthralgias.   Skin:  Positive for rash.     Objective:     /86   Pulse 81   Ht 160 cm (63\")   Wt 88.9 kg (196 lb)   SpO2 99%   BMI 34.72 kg/mý   Physical Exam  Vitals reviewed.   Constitutional:       General: She is not in acute distress.     Appearance: Normal appearance. She is not ill-appearing.   HENT:      Head: Normocephalic and atraumatic.   Eyes:      Conjunctiva/sclera: Conjunctivae normal.   Cardiovascular:      Rate and Rhythm: Normal rate and regular rhythm.   Pulmonary:      Effort: Pulmonary effort is normal. No respiratory distress.      Breath sounds: Normal breath sounds.   Abdominal:      General: There is no distension.      Palpations: Abdomen is soft.      Tenderness: There is no abdominal tenderness.   Skin:     Findings: Rash present.   Neurological:      Mental Status: " She is alert.        Assessment/Plan:     Diagnoses and all orders for this visit:    1. Rash of both hands (Primary)  -     hydrocortisone 1 % ointment; Apply 1 application  topically to the appropriate area as directed 2 (Two) Times a Day.  Dispense: 110 g; Refill: 0  Patient has had this rash for almost 2 months.  Rash is only on the hands.  She believes it is related to something she ate.  Differentials includes dyshidrotic eczema.  We will try hydrocortisone cream.  Patient to return in 3 weeks if rash does not improve.  We will consider checking for celiac disease at that time.  Patient also advised to keep a food diary.    2. Chronic right shoulder pain  -     tiZANidine (ZANAFLEX) 4 MG tablet; Take 1 tablet by mouth At Night As Needed for Muscle Spasms.  Dispense: 30 tablet; Refill: 0  Has chronic right pain shoulder.  She also reports neck pain patient.  She takes Zanaflex 4 mg as needed.  Side effects discussed with patient.  Patient verbalized understanding.    Depression screening: Up to date; last screen 3/7/2023     Follow-up:     Return in about 3 weeks (around 9/14/2023).    Preventative:  Health Maintenance   Topic Date Due    COVID-19 Vaccine (3 - Moderna series) 10/26/2021    INFLUENZA VACCINE  10/01/2023    ANNUAL PHYSICAL  06/14/2024    PAP SMEAR  06/01/2025    TDAP/TD VACCINES (3 - Td or Tdap) 05/05/2031    HEPATITIS C SCREENING  Completed    Pneumococcal Vaccine 0-64  Aged Out    CHLAMYDIA SCREENING  Discontinued       Alcohol use:  reports no history of alcohol use.  Nicotine status  reports that she has never smoked. She has never used smokeless tobacco.     Goals         Reduce fat intake/eat healthier (pt-stated)       Barriers:  Likes [employer], but will try to eat it less frequently, e.g. Once every 2 weeks instead of weekly              RISK SCORE: 2    Signature        Romana Luciano MD PGY3  [facility]  [address],  [place] [address]  Office: [phone]    This document has been electronically signed by Romana Daniel MD on August 24, 2023 09:59 CDT

## 2023-08-29 ENCOUNTER — LAB (OUTPATIENT)
Dept: LAB | Facility: HOSPITAL | Age: 28
End: 2023-08-29
Payer: MEDICAID

## 2023-08-29 ENCOUNTER — OFFICE VISIT (OUTPATIENT)
Dept: FAMILY MEDICINE CLINIC | Facility: CLINIC | Age: 28
End: 2023-08-29
Payer: MEDICAID

## 2023-08-29 VITALS
RESPIRATION RATE: 16 BRPM | HEART RATE: 84 BPM | OXYGEN SATURATION: 98 % | WEIGHT: 197.3 LBS | DIASTOLIC BLOOD PRESSURE: 84 MMHG | BODY MASS INDEX: 34.96 KG/M2 | SYSTOLIC BLOOD PRESSURE: 128 MMHG | HEIGHT: 63 IN

## 2023-08-29 DIAGNOSIS — R11.0 NAUSEA: ICD-10-CM

## 2023-08-29 DIAGNOSIS — R53.83 OTHER FATIGUE: ICD-10-CM
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
## 2023-08-29 DIAGNOSIS — Z76.89 ENCOUNTER TO ESTABLISH CARE: Primary | ICD-10-CM

## 2023-08-29 PROCEDURE — 86003 ALLG SPEC IGE CRUDE XTRC EA: CPT

## 2023-08-29 PROCEDURE — 82785 ASSAY OF IGE: CPT

## 2023-08-29 PROCEDURE — 86757 RICKETTSIA ANTIBODY: CPT

## 2023-08-29 PROCEDURE — 86364 TISS TRNSGLTMNASE EA IG CLAS: CPT

## 2023-08-29 PROCEDURE — 82784 ASSAY IGA/IGD/IGG/IGM EACH: CPT

## 2023-08-29 PROCEDURE — 36415 COLL VENOUS BLD VENIPUNCTURE: CPT

## 2023-08-29 PROCEDURE — 86231 EMA EACH IG CLASS: CPT

## 2023-08-29 PROCEDURE — 86008 ALLG SPEC IGE RECOMB EA: CPT

## 2023-08-29 NOTE — PROGRESS NOTES
"Chief Complaint  Establish Care (/)    Subjective          Yumiko Lopez presents to [facility] [place] to establish care. About 2 months ago she developed a rash that started on her right leg and palm of hands and now is still popping up on her hands. She states she has been congested all summer, randomly spikes a fever. Patient states the rash on her palms itch.       Rash  This is a recurrent problem. The current episode started more than 1 month ago. The problem has been waxing and waning since onset. The affected locations include the right hand and left hand. The rash is characterized by redness. It is unknown if there was an exposure to a precipitant. Past treatments include moisturizer, topical steroids, oral steroids and anti-itch cream. The treatment provided no relief.   Outpatient Medications Prior to Visit   Medication Sig Dispense Refill    famotidine (PEPCID) 20 MG tablet Take 1 tablet by mouth 2 (Two) Times a Day. 60 tablet 1    levothyroxine (SYNTHROID, LEVOTHROID) 25 MCG tablet Take 1 tablet by mouth Daily. 90 tablet 0    ondansetron ODT (ZOFRAN-ODT) 8 MG disintegrating tablet Place 1 tablet on the tongue Every 8 (Eight) Hours As Needed for Nausea or Vomiting. 20 tablet 0    sertraline (ZOLOFT) 50 MG tablet Take 1 tablet by mouth Daily. 90 tablet 0    tiZANidine (ZANAFLEX) 4 MG tablet Take 1 tablet by mouth At Night As Needed for Muscle Spasms. 30 tablet 0    hydrocortisone 1 % ointment Apply 1 application  topically to the appropriate area as directed 2 (Two) Times a Day. (Patient not taking: Reported on 8/29/2023) 110 g 0     No facility-administered medications prior to visit.       Review of Systems   Skin:  Positive for rash.       Objective   Vital Signs:   Visit Vitals  /84 (BP Location: Left arm, Patient Position: Sitting, Cuff Size: Large Adult)   Pulse 84   Resp 16   Ht 160 cm (63\")   Wt 89.5 kg (197 lb 4.8 oz)   LMP 08/22/2023 " (Approximate)   SpO2 98%   BMI 34.95 kg/mý     Physical Exam  Vitals and nursing note reviewed.   Constitutional:       Appearance: She is well-developed.   HENT:      Head: Normocephalic and atraumatic.   Eyes:      General: Lids are normal.      Conjunctiva/sclera: Conjunctivae normal.   Neck:      Thyroid: No thyroid mass or thyromegaly.      Trachea: Trachea normal. No tracheal tenderness.   Cardiovascular:      Rate and Rhythm: Normal rate.      Pulses: Normal pulses.      Heart sounds: Normal heart sounds.   Pulmonary:      Effort: Pulmonary effort is normal. No respiratory distress.      Breath sounds: Normal breath sounds. No wheezing.   Abdominal:      General: There is no distension.      Palpations: Abdomen is soft. There is no mass.   Musculoskeletal:         General: Normal range of motion.      Cervical back: Normal range of motion. No edema.   Skin:     General: Skin is warm and dry.      Coloration: Skin is not pale.      Findings: Rash present. No abrasion, erythema or lesion.          Neurological:      Mental Status: She is alert and oriented to person, place, and time.   Psychiatric:         Mood and Affect: Mood is not anxious. Affect is not inappropriate.         Speech: Speech normal.         Behavior: Behavior normal.         Thought Content: Thought content normal.         Judgment: Judgment normal. Judgment is not impulsive.      Result Review :                 Assessment and Plan    Diagnoses and all orders for this visit:    1. Encounter to establish care (Primary)    2. Tick bite, unspecified site, subsequent encounter  -     Celiac Disease Panel; Future  -     Alpha-Gal IgE Panel; Future  -     [place] [facility] (IgG / M); Future    3. Other fatigue  -     Celiac Disease Panel; Future  -     Alpha-Gal IgE Panel; Future  -     [facility] (IgG / M); Future    4. Nausea  -     Celiac Disease Panel; Future  -     Alpha-Gal IgE Panel; Future  -     [place] [facility] (IgG / M);  Future      Complete ordered lab work   We will call with results  Pending lab results we will discuss further treatment plan and monitoring     Discussed making a food log to see if you can find a correlation with your outbreaks.           Follow Up   Return if symptoms worsen or fail to improve, for Next scheduled follow up.  Patient was given instructions and counseling regarding her condition or for health maintenance advice. Please see specific information pulled into the AVS if appropriate.           This document has been electronically signed by JOHANA Shields on August 30, 2023 07:45 CDT

## 2023-08-30 LAB
ENDOMYSIUM IGA SER QL: NEGATIVE
IGA SERPL-MCNC: 144 MG/DL (ref 87–352)
TTG IGA SER-ACNC: <2 U/ML (ref 0–3)

## 2023-08-30 NOTE — PROGRESS NOTES
I have seen the patient.  I have reviewed the notes, assessments, and/or procedures performed by Jeffery Chang MD during office visit I concur with her/his documentation and assessment and plan for Yumiko Lopez.            This document has been electronically signed by Alan Antony MD on August 30, 2023 16:02 CDT

## 2023-09-01 LAB
ALPHA-GAL IGE QN: 2.88 KU/L
BEEF IGE QN: 0.88 KU/L
CONV CLASS DESCRIPTION: ABNORMAL
IGE SERPL-ACNC: 37 IU/ML (ref 6–495)
LAMB IGE QN: 0.53 KU/L
PORK IGE QN: 0.65 KU/L
R RICKETTSI IGG SER QL IA: POSITIVE
R RICKETTSI IGG TITR SER IF: NORMAL {TITER}
R RICKETTSI IGM SER-ACNC: 0.52 INDEX (ref 0–0.89)

## 2023-09-08 ENCOUNTER — TELEPHONE (OUTPATIENT)
Dept: FAMILY MEDICINE CLINIC | Facility: CLINIC | Age: 28
End: 2023-09-08
Payer: MEDICAID

## 2023-09-11 DIAGNOSIS — A77.0 ROCKY MOUNTAIN SPOTTED FEVER: Primary | ICD-10-CM

## 2023-09-11 RX ORDER — DOXYCYCLINE HYCLATE 100 MG/1
100 CAPSULE ORAL 2 TIMES DAILY
Qty: 14 CAPSULE | Refills: 0 | Status: SHIPPED | OUTPATIENT
Start: 2023-09-11 | End: 2023-09-18

## 2023-09-11 NOTE — TELEPHONE ENCOUNTER
Per JOHANA Ruano, Ms. Lopez has been called with recent lab results and recommendations.     She states she had never been treated for RMSF,  She states she had never been tested or told she had it.    Does she need and antibiotic?     CYNTHIA Richardson

## 2023-09-11 NOTE — TELEPHONE ENCOUNTER
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

## 2023-09-28 DIAGNOSIS — Z91.018 MULTIPLE FOOD ALLERGIES: Primary | ICD-10-CM

## 2025-06-28 ENCOUNTER — READMISSION MANAGEMENT (OUTPATIENT)
Dept: CALL CENTER | Facility: HOSPITAL | Age: 30
End: 2025-06-28
Payer: COMMERCIAL

## 2025-06-28 NOTE — OUTREACH NOTE
Prep Survey      Flowsheet Row Responses   [facility] facility patient discharged from? Non-[facility]   Is LACE score < 7 ? Non-BH Discharge   Eligibility Not Eligible   What are the reasons patient is not eligible? Other  [[facility]]   Does the patient have one of the following disease processes/diagnoses(primary or secondary)? Other   Prep survey completed? Yes            AYANA FRENCH - Registered Nurse